# Patient Record
Sex: MALE | Race: WHITE | Employment: OTHER | URBAN - METROPOLITAN AREA
[De-identification: names, ages, dates, MRNs, and addresses within clinical notes are randomized per-mention and may not be internally consistent; named-entity substitution may affect disease eponyms.]

---

## 2017-01-13 ENCOUNTER — TELEPHONE (OUTPATIENT)
Dept: FAMILY MEDICINE CLINIC | Age: 82
End: 2017-01-13

## 2017-01-16 ENCOUNTER — TELEPHONE (OUTPATIENT)
Dept: FAMILY MEDICINE CLINIC | Age: 82
End: 2017-01-16

## 2017-01-16 DIAGNOSIS — R79.89 LOW TESTOSTERONE: Primary | ICD-10-CM

## 2017-01-17 ENCOUNTER — NURSE ONLY (OUTPATIENT)
Dept: FAMILY MEDICINE CLINIC | Age: 82
End: 2017-01-17

## 2017-01-17 DIAGNOSIS — R79.89 LOW TESTOSTERONE: Primary | ICD-10-CM

## 2017-01-17 DIAGNOSIS — E78.00 PURE HYPERCHOLESTEROLEMIA: ICD-10-CM

## 2017-01-17 PROCEDURE — 96372 THER/PROPH/DIAG INJ SC/IM: CPT | Performed by: FAMILY MEDICINE

## 2017-01-17 RX ORDER — TESTOSTERONE CYPIONATE 200 MG/ML
200 INJECTION INTRAMUSCULAR ONCE
Status: COMPLETED | OUTPATIENT
Start: 2017-01-17 | End: 2017-01-17

## 2017-01-17 RX ADMIN — TESTOSTERONE CYPIONATE 200 MG: 200 INJECTION INTRAMUSCULAR at 10:17

## 2017-01-18 RX ORDER — SIMVASTATIN 10 MG
10 TABLET ORAL NIGHTLY
Qty: 90 TABLET | Refills: 2 | Status: SHIPPED | OUTPATIENT
Start: 2017-01-18 | End: 2018-01-09 | Stop reason: SDUPTHER

## 2017-01-31 ENCOUNTER — NURSE ONLY (OUTPATIENT)
Dept: FAMILY MEDICINE CLINIC | Age: 82
End: 2017-01-31

## 2017-01-31 DIAGNOSIS — R79.89 LOW TESTOSTERONE: Primary | ICD-10-CM

## 2017-01-31 PROCEDURE — 96372 THER/PROPH/DIAG INJ SC/IM: CPT | Performed by: FAMILY MEDICINE

## 2017-01-31 RX ORDER — TESTOSTERONE CYPIONATE 200 MG/ML
200 INJECTION INTRAMUSCULAR ONCE
Status: COMPLETED | OUTPATIENT
Start: 2017-01-31 | End: 2017-01-31

## 2017-01-31 RX ADMIN — TESTOSTERONE CYPIONATE 200 MG: 200 INJECTION INTRAMUSCULAR at 13:26

## 2017-02-15 ENCOUNTER — NURSE ONLY (OUTPATIENT)
Dept: FAMILY MEDICINE CLINIC | Age: 82
End: 2017-02-15

## 2017-02-15 DIAGNOSIS — R79.89 LOW TESTOSTERONE: Primary | ICD-10-CM

## 2017-02-15 PROCEDURE — 96372 THER/PROPH/DIAG INJ SC/IM: CPT | Performed by: FAMILY MEDICINE

## 2017-02-15 RX ORDER — TESTOSTERONE CYPIONATE 200 MG/ML
200 INJECTION INTRAMUSCULAR ONCE
Status: COMPLETED | OUTPATIENT
Start: 2017-02-15 | End: 2017-02-15

## 2017-02-15 RX ADMIN — TESTOSTERONE CYPIONATE 200 MG: 200 INJECTION INTRAMUSCULAR at 10:17

## 2017-03-01 ENCOUNTER — NURSE ONLY (OUTPATIENT)
Dept: FAMILY MEDICINE CLINIC | Age: 82
End: 2017-03-01

## 2017-03-01 DIAGNOSIS — R79.89 LOW TESTOSTERONE: Primary | ICD-10-CM

## 2017-03-01 PROCEDURE — 96372 THER/PROPH/DIAG INJ SC/IM: CPT | Performed by: FAMILY MEDICINE

## 2017-03-01 RX ORDER — TESTOSTERONE CYPIONATE 200 MG/ML
200 INJECTION INTRAMUSCULAR ONCE
Status: COMPLETED | OUTPATIENT
Start: 2017-03-01 | End: 2017-03-01

## 2017-03-01 RX ADMIN — TESTOSTERONE CYPIONATE 200 MG: 200 INJECTION INTRAMUSCULAR at 11:29

## 2017-03-13 ENCOUNTER — TELEPHONE (OUTPATIENT)
Dept: FAMILY MEDICINE CLINIC | Age: 82
End: 2017-03-13

## 2017-03-13 RX ORDER — CEPHALEXIN 500 MG/1
500 CAPSULE ORAL 3 TIMES DAILY
Qty: 30 CAPSULE | Refills: 0 | Status: SHIPPED | OUTPATIENT
Start: 2017-03-13 | End: 2017-03-23

## 2017-03-27 ENCOUNTER — OFFICE VISIT (OUTPATIENT)
Dept: FAMILY MEDICINE CLINIC | Age: 82
End: 2017-03-27

## 2017-03-27 VITALS
HEART RATE: 76 BPM | BODY MASS INDEX: 25.9 KG/M2 | HEIGHT: 71 IN | SYSTOLIC BLOOD PRESSURE: 124 MMHG | RESPIRATION RATE: 14 BRPM | DIASTOLIC BLOOD PRESSURE: 68 MMHG | WEIGHT: 185 LBS

## 2017-03-27 DIAGNOSIS — I25.810 CORONARY ARTERY DISEASE INVOLVING AUTOLOGOUS VEIN CORONARY BYPASS GRAFT WITHOUT ANGINA PECTORIS: ICD-10-CM

## 2017-03-27 DIAGNOSIS — E78.00 PURE HYPERCHOLESTEROLEMIA: ICD-10-CM

## 2017-03-27 DIAGNOSIS — I10 ESSENTIAL HYPERTENSION: ICD-10-CM

## 2017-03-27 DIAGNOSIS — R79.89 LOW TESTOSTERONE: ICD-10-CM

## 2017-03-27 DIAGNOSIS — J01.20 ACUTE NON-RECURRENT ETHMOIDAL SINUSITIS: ICD-10-CM

## 2017-03-27 DIAGNOSIS — M15.9 PRIMARY OSTEOARTHRITIS INVOLVING MULTIPLE JOINTS: ICD-10-CM

## 2017-03-27 DIAGNOSIS — E11.9 TYPE 2 DIABETES MELLITUS WITHOUT COMPLICATION, WITHOUT LONG-TERM CURRENT USE OF INSULIN (HCC): Primary | ICD-10-CM

## 2017-03-27 DIAGNOSIS — E11.8 TYPE 2 DIABETES MELLITUS WITH COMPLICATION, WITHOUT LONG-TERM CURRENT USE OF INSULIN (HCC): ICD-10-CM

## 2017-03-27 LAB
GLUCOSE BLD-MCNC: 141 MG/DL
HBA1C MFR BLD: 6.7 %

## 2017-03-27 PROCEDURE — 99213 OFFICE O/P EST LOW 20 MIN: CPT | Performed by: FAMILY MEDICINE

## 2017-03-27 PROCEDURE — 82962 GLUCOSE BLOOD TEST: CPT | Performed by: FAMILY MEDICINE

## 2017-03-27 PROCEDURE — 83036 HEMOGLOBIN GLYCOSYLATED A1C: CPT | Performed by: FAMILY MEDICINE

## 2017-03-27 RX ORDER — ENALAPRIL MALEATE 2.5 MG/1
TABLET ORAL
Qty: 90 TABLET | Refills: 1 | Status: SHIPPED | OUTPATIENT
Start: 2017-03-27 | End: 2017-07-11 | Stop reason: SDUPTHER

## 2017-03-27 RX ORDER — CEPHALEXIN 500 MG/1
500 CAPSULE ORAL 3 TIMES DAILY
Qty: 30 CAPSULE | Refills: 0 | Status: SHIPPED | OUTPATIENT
Start: 2017-03-27 | End: 2017-04-25 | Stop reason: ALTCHOICE

## 2017-03-27 RX ORDER — MELOXICAM 7.5 MG/1
TABLET ORAL
Qty: 90 TABLET | Refills: 1 | Status: SHIPPED | OUTPATIENT
Start: 2017-03-27 | End: 2017-10-17 | Stop reason: SDUPTHER

## 2017-03-27 RX ORDER — GLIMEPIRIDE 4 MG/1
TABLET ORAL
Qty: 90 TABLET | Refills: 1 | Status: SHIPPED | OUTPATIENT
Start: 2017-03-27 | End: 2017-07-11 | Stop reason: SDUPTHER

## 2017-03-27 ASSESSMENT — ENCOUNTER SYMPTOMS
ABDOMINAL PAIN: 0
SORE THROAT: 0
BACK PAIN: 0
SHORTNESS OF BREATH: 0
CHEST TIGHTNESS: 0
NAUSEA: 0
COUGH: 1
CONSTIPATION: 0
ORTHOPNEA: 0
TROUBLE SWALLOWING: 0
BLOOD IN STOOL: 0
EYE PAIN: 0

## 2017-03-27 ASSESSMENT — PATIENT HEALTH QUESTIONNAIRE - PHQ9
1. LITTLE INTEREST OR PLEASURE IN DOING THINGS: 0
SUM OF ALL RESPONSES TO PHQ QUESTIONS 1-9: 0
SUM OF ALL RESPONSES TO PHQ9 QUESTIONS 1 & 2: 0
2. FEELING DOWN, DEPRESSED OR HOPELESS: 0

## 2017-04-07 ENCOUNTER — NURSE ONLY (OUTPATIENT)
Dept: FAMILY MEDICINE CLINIC | Age: 82
End: 2017-04-07

## 2017-04-07 DIAGNOSIS — R79.89 LOW TESTOSTERONE: Primary | ICD-10-CM

## 2017-04-07 PROCEDURE — 96372 THER/PROPH/DIAG INJ SC/IM: CPT | Performed by: FAMILY MEDICINE

## 2017-04-07 RX ORDER — TESTOSTERONE CYPIONATE 200 MG/ML
200 INJECTION INTRAMUSCULAR ONCE
Status: COMPLETED | OUTPATIENT
Start: 2017-04-07 | End: 2017-04-07

## 2017-04-07 RX ADMIN — TESTOSTERONE CYPIONATE 200 MG: 200 INJECTION INTRAMUSCULAR at 09:51

## 2017-04-25 ENCOUNTER — OFFICE VISIT (OUTPATIENT)
Dept: INTERNAL MEDICINE | Age: 82
End: 2017-04-25

## 2017-04-25 VITALS
HEIGHT: 70 IN | HEART RATE: 80 BPM | SYSTOLIC BLOOD PRESSURE: 138 MMHG | DIASTOLIC BLOOD PRESSURE: 76 MMHG | WEIGHT: 189.2 LBS | BODY MASS INDEX: 27.09 KG/M2

## 2017-04-25 DIAGNOSIS — E78.00 PURE HYPERCHOLESTEROLEMIA: ICD-10-CM

## 2017-04-25 DIAGNOSIS — I10 ESSENTIAL HYPERTENSION: ICD-10-CM

## 2017-04-25 DIAGNOSIS — I25.810 CORONARY ARTERY DISEASE INVOLVING AUTOLOGOUS VEIN CORONARY BYPASS GRAFT WITHOUT ANGINA PECTORIS: Primary | ICD-10-CM

## 2017-04-25 PROCEDURE — 4040F PNEUMOC VAC/ADMIN/RCVD: CPT | Performed by: INTERNAL MEDICINE

## 2017-04-25 PROCEDURE — G8427 DOCREV CUR MEDS BY ELIG CLIN: HCPCS | Performed by: INTERNAL MEDICINE

## 2017-04-25 PROCEDURE — 99214 OFFICE O/P EST MOD 30 MIN: CPT | Performed by: INTERNAL MEDICINE

## 2017-04-25 PROCEDURE — G8420 CALC BMI NORM PARAMETERS: HCPCS | Performed by: INTERNAL MEDICINE

## 2017-04-25 PROCEDURE — 1123F ACP DISCUSS/DSCN MKR DOCD: CPT | Performed by: INTERNAL MEDICINE

## 2017-04-25 PROCEDURE — 93000 ELECTROCARDIOGRAM COMPLETE: CPT | Performed by: INTERNAL MEDICINE

## 2017-04-25 PROCEDURE — 1036F TOBACCO NON-USER: CPT | Performed by: INTERNAL MEDICINE

## 2017-04-25 PROCEDURE — G8598 ASA/ANTIPLAT THER USED: HCPCS | Performed by: INTERNAL MEDICINE

## 2017-05-03 ENCOUNTER — NURSE ONLY (OUTPATIENT)
Dept: FAMILY MEDICINE CLINIC | Age: 82
End: 2017-05-03

## 2017-05-03 DIAGNOSIS — R79.89 LOW TESTOSTERONE: Primary | ICD-10-CM

## 2017-05-03 PROCEDURE — 96372 THER/PROPH/DIAG INJ SC/IM: CPT | Performed by: FAMILY MEDICINE

## 2017-05-03 RX ORDER — TESTOSTERONE CYPIONATE 200 MG/ML
200 INJECTION INTRAMUSCULAR ONCE
Status: COMPLETED | OUTPATIENT
Start: 2017-05-03 | End: 2017-05-03

## 2017-05-03 RX ADMIN — TESTOSTERONE CYPIONATE 200 MG: 200 INJECTION INTRAMUSCULAR at 10:37

## 2017-05-08 ENCOUNTER — TELEPHONE (OUTPATIENT)
Dept: FAMILY MEDICINE CLINIC | Age: 82
End: 2017-05-08

## 2017-05-08 DIAGNOSIS — R79.89 LOW TESTOSTERONE: Primary | ICD-10-CM

## 2017-05-31 ENCOUNTER — NURSE ONLY (OUTPATIENT)
Dept: FAMILY MEDICINE CLINIC | Age: 82
End: 2017-05-31

## 2017-05-31 DIAGNOSIS — R79.89 LOW TESTOSTERONE: Primary | ICD-10-CM

## 2017-05-31 PROCEDURE — 96372 THER/PROPH/DIAG INJ SC/IM: CPT | Performed by: FAMILY MEDICINE

## 2017-05-31 RX ORDER — TESTOSTERONE CYPIONATE 200 MG/ML
300 INJECTION INTRAMUSCULAR ONCE
Status: COMPLETED | OUTPATIENT
Start: 2017-05-31 | End: 2017-05-31

## 2017-05-31 RX ADMIN — TESTOSTERONE CYPIONATE 300 MG: 200 INJECTION INTRAMUSCULAR at 10:59

## 2017-06-26 DIAGNOSIS — I10 ESSENTIAL HYPERTENSION: ICD-10-CM

## 2017-06-27 RX ORDER — MELOXICAM 7.5 MG/1
TABLET ORAL
Qty: 90 TABLET | Refills: 1 | Status: SHIPPED | OUTPATIENT
Start: 2017-06-27 | End: 2018-01-09 | Stop reason: SDUPTHER

## 2017-06-30 ENCOUNTER — NURSE ONLY (OUTPATIENT)
Dept: FAMILY MEDICINE CLINIC | Age: 82
End: 2017-06-30

## 2017-06-30 DIAGNOSIS — R79.89 LOW TESTOSTERONE: Primary | ICD-10-CM

## 2017-06-30 PROCEDURE — 96372 THER/PROPH/DIAG INJ SC/IM: CPT | Performed by: FAMILY MEDICINE

## 2017-06-30 RX ORDER — TESTOSTERONE CYPIONATE 200 MG/ML
200 INJECTION INTRAMUSCULAR ONCE
Status: COMPLETED | OUTPATIENT
Start: 2017-06-30 | End: 2017-06-30

## 2017-06-30 RX ADMIN — TESTOSTERONE CYPIONATE 200 MG: 200 INJECTION INTRAMUSCULAR at 12:53

## 2017-07-11 ENCOUNTER — OFFICE VISIT (OUTPATIENT)
Dept: FAMILY MEDICINE CLINIC | Age: 82
End: 2017-07-11

## 2017-07-11 VITALS
DIASTOLIC BLOOD PRESSURE: 56 MMHG | HEIGHT: 70 IN | RESPIRATION RATE: 14 BRPM | SYSTOLIC BLOOD PRESSURE: 102 MMHG | HEART RATE: 68 BPM | BODY MASS INDEX: 26.25 KG/M2 | WEIGHT: 183.38 LBS

## 2017-07-11 DIAGNOSIS — I10 ESSENTIAL HYPERTENSION: ICD-10-CM

## 2017-07-11 DIAGNOSIS — E11.9 TYPE 2 DIABETES MELLITUS WITHOUT COMPLICATION, WITHOUT LONG-TERM CURRENT USE OF INSULIN (HCC): Primary | ICD-10-CM

## 2017-07-11 DIAGNOSIS — E78.00 PURE HYPERCHOLESTEROLEMIA: ICD-10-CM

## 2017-07-11 DIAGNOSIS — E11.8 TYPE 2 DIABETES MELLITUS WITH COMPLICATION, WITHOUT LONG-TERM CURRENT USE OF INSULIN (HCC): ICD-10-CM

## 2017-07-11 DIAGNOSIS — M17.0 PRIMARY OSTEOARTHRITIS OF BOTH KNEES: ICD-10-CM

## 2017-07-11 LAB
GLUCOSE BLD-MCNC: 137 MG/DL
HBA1C MFR BLD: 6.8 %

## 2017-07-11 PROCEDURE — 99213 OFFICE O/P EST LOW 20 MIN: CPT | Performed by: FAMILY MEDICINE

## 2017-07-11 PROCEDURE — 82962 GLUCOSE BLOOD TEST: CPT | Performed by: FAMILY MEDICINE

## 2017-07-11 PROCEDURE — 83036 HEMOGLOBIN GLYCOSYLATED A1C: CPT | Performed by: FAMILY MEDICINE

## 2017-07-11 RX ORDER — GLIMEPIRIDE 4 MG/1
TABLET ORAL
Qty: 90 TABLET | Refills: 1 | Status: SHIPPED | OUTPATIENT
Start: 2017-07-11 | End: 2017-09-22 | Stop reason: SDUPTHER

## 2017-07-11 RX ORDER — ENALAPRIL MALEATE 2.5 MG/1
TABLET ORAL
Qty: 90 TABLET | Refills: 1 | Status: SHIPPED | OUTPATIENT
Start: 2017-07-11 | End: 2018-01-09 | Stop reason: SDUPTHER

## 2017-07-11 ASSESSMENT — ENCOUNTER SYMPTOMS
CHEST TIGHTNESS: 0
CONSTIPATION: 0
BACK PAIN: 0
BLOOD IN STOOL: 0
TROUBLE SWALLOWING: 0
SORE THROAT: 0
EYE PAIN: 0
SHORTNESS OF BREATH: 0
COUGH: 0
NAUSEA: 0
ABDOMINAL PAIN: 0
ORTHOPNEA: 0

## 2017-07-11 ASSESSMENT — PATIENT HEALTH QUESTIONNAIRE - PHQ9
SUM OF ALL RESPONSES TO PHQ QUESTIONS 1-9: 0
SUM OF ALL RESPONSES TO PHQ9 QUESTIONS 1 & 2: 0
1. LITTLE INTEREST OR PLEASURE IN DOING THINGS: 0
2. FEELING DOWN, DEPRESSED OR HOPELESS: 0

## 2017-07-25 ENCOUNTER — NURSE ONLY (OUTPATIENT)
Dept: FAMILY MEDICINE CLINIC | Age: 82
End: 2017-07-25

## 2017-07-25 DIAGNOSIS — R79.89 LOW TESTOSTERONE: Primary | ICD-10-CM

## 2017-07-25 PROCEDURE — 96372 THER/PROPH/DIAG INJ SC/IM: CPT | Performed by: FAMILY MEDICINE

## 2017-07-25 RX ORDER — TESTOSTERONE CYPIONATE 200 MG/ML
300 INJECTION INTRAMUSCULAR ONCE
Status: COMPLETED | OUTPATIENT
Start: 2017-07-25 | End: 2017-07-25

## 2017-07-25 RX ADMIN — TESTOSTERONE CYPIONATE 300 MG: 200 INJECTION INTRAMUSCULAR at 11:41

## 2017-08-22 ENCOUNTER — NURSE ONLY (OUTPATIENT)
Dept: FAMILY MEDICINE CLINIC | Age: 82
End: 2017-08-22

## 2017-08-22 DIAGNOSIS — R79.89 LOW TESTOSTERONE: Primary | ICD-10-CM

## 2017-08-22 PROCEDURE — 96372 THER/PROPH/DIAG INJ SC/IM: CPT | Performed by: FAMILY MEDICINE

## 2017-08-22 RX ORDER — TESTOSTERONE CYPIONATE 200 MG/ML
200 INJECTION INTRAMUSCULAR ONCE
Status: COMPLETED | OUTPATIENT
Start: 2017-08-22 | End: 2017-08-22

## 2017-08-22 RX ADMIN — TESTOSTERONE CYPIONATE 200 MG: 200 INJECTION INTRAMUSCULAR at 10:56

## 2017-09-20 ENCOUNTER — NURSE ONLY (OUTPATIENT)
Dept: FAMILY MEDICINE CLINIC | Age: 82
End: 2017-09-20

## 2017-09-20 DIAGNOSIS — R79.89 LOW TESTOSTERONE: Primary | ICD-10-CM

## 2017-09-20 PROCEDURE — 96372 THER/PROPH/DIAG INJ SC/IM: CPT | Performed by: FAMILY MEDICINE

## 2017-09-20 RX ORDER — TESTOSTERONE CYPIONATE 200 MG/ML
200 INJECTION INTRAMUSCULAR ONCE
Status: COMPLETED | OUTPATIENT
Start: 2017-09-20 | End: 2017-09-20

## 2017-09-20 RX ADMIN — TESTOSTERONE CYPIONATE 200 MG: 200 INJECTION INTRAMUSCULAR at 10:26

## 2017-09-22 DIAGNOSIS — E11.8 TYPE 2 DIABETES MELLITUS WITH COMPLICATION, WITHOUT LONG-TERM CURRENT USE OF INSULIN (HCC): ICD-10-CM

## 2017-09-24 RX ORDER — GLIMEPIRIDE 4 MG/1
TABLET ORAL
Qty: 90 TABLET | Refills: 1 | Status: SHIPPED | OUTPATIENT
Start: 2017-09-24 | End: 2018-01-09 | Stop reason: SDUPTHER

## 2017-10-11 ENCOUNTER — HOSPITAL ENCOUNTER (OUTPATIENT)
Age: 82
Discharge: HOME | End: 2017-10-11
Payer: MEDICARE

## 2017-10-11 DIAGNOSIS — E78.00 PURE HYPERCHOLESTEROLEMIA: ICD-10-CM

## 2017-10-11 DIAGNOSIS — I25.810 CORONARY ARTERY DISEASE INVOLVING AUTOLOGOUS VEIN CORONARY BYPASS GRAFT WITHOUT ANGINA PECTORIS: ICD-10-CM

## 2017-10-11 DIAGNOSIS — I10 ESSENTIAL HYPERTENSION: ICD-10-CM

## 2017-10-11 LAB
ALT SERPL-CCNC: 15 U/L (ref 11–66)
ANION GAP SERPL CALCULATED.3IONS-SCNC: 10 MEQ/L (ref 8–16)
AST SERPL-CCNC: 14 U/L (ref 5–40)
BUN BLDV-MCNC: 33 MG/DL (ref 7–22)
CALCIUM SERPL-MCNC: 9 MG/DL (ref 8.5–10.5)
CHLORIDE BLD-SCNC: 104 MEQ/L (ref 98–111)
CHOLESTEROL, TOTAL: 124 MG/DL (ref 100–199)
CO2: 27 MEQ/L (ref 23–33)
CREAT SERPL-MCNC: 1.1 MG/DL (ref 0.4–1.2)
GFR SERPL CREATININE-BSD FRML MDRD: 64 ML/MIN/1.73M2
GLUCOSE BLD-MCNC: 150 MG/DL (ref 70–108)
HCT VFR BLD CALC: 48.2 % (ref 42–52)
HDLC SERPL-MCNC: 42 MG/DL
HEMOGLOBIN: 16.4 GM/DL (ref 14–18)
LDL CHOLESTEROL CALCULATED: 63 MG/DL
MCH RBC QN AUTO: 31.9 PG (ref 27–31)
MCHC RBC AUTO-ENTMCNC: 34 GM/DL (ref 33–37)
MCV RBC AUTO: 93.7 FL (ref 80–94)
PDW BLD-RTO: 13.8 % (ref 11.5–14.5)
PLATELET # BLD: 162 THOU/MM3 (ref 130–400)
PMV BLD AUTO: 7.1 MCM (ref 7.4–10.4)
POTASSIUM SERPL-SCNC: 5.3 MEQ/L (ref 3.5–5.2)
RBC # BLD: 5.14 MILL/MM3 (ref 4.7–6.1)
SODIUM BLD-SCNC: 141 MEQ/L (ref 135–145)
TRIGL SERPL-MCNC: 95 MG/DL (ref 0–199)
WBC # BLD: 6.1 THOU/MM3 (ref 4.8–10.8)

## 2017-10-11 PROCEDURE — 36415 COLL VENOUS BLD VENIPUNCTURE: CPT

## 2017-10-11 PROCEDURE — 84460 ALANINE AMINO (ALT) (SGPT): CPT

## 2017-10-11 PROCEDURE — 85027 COMPLETE CBC AUTOMATED: CPT

## 2017-10-11 PROCEDURE — 84450 TRANSFERASE (AST) (SGOT): CPT

## 2017-10-11 PROCEDURE — 80061 LIPID PANEL: CPT

## 2017-10-11 PROCEDURE — 80048 BASIC METABOLIC PNL TOTAL CA: CPT

## 2017-10-17 ENCOUNTER — OFFICE VISIT (OUTPATIENT)
Dept: INTERNAL MEDICINE CLINIC | Age: 82
End: 2017-10-17
Payer: MEDICARE

## 2017-10-17 ENCOUNTER — OFFICE VISIT (OUTPATIENT)
Dept: FAMILY MEDICINE CLINIC | Age: 82
End: 2017-10-17

## 2017-10-17 VITALS
SYSTOLIC BLOOD PRESSURE: 108 MMHG | HEIGHT: 70 IN | BODY MASS INDEX: 25.62 KG/M2 | DIASTOLIC BLOOD PRESSURE: 54 MMHG | HEART RATE: 82 BPM | WEIGHT: 179 LBS

## 2017-10-17 VITALS
SYSTOLIC BLOOD PRESSURE: 102 MMHG | HEIGHT: 70 IN | HEART RATE: 72 BPM | WEIGHT: 180.5 LBS | RESPIRATION RATE: 14 BRPM | DIASTOLIC BLOOD PRESSURE: 54 MMHG | BODY MASS INDEX: 25.84 KG/M2

## 2017-10-17 DIAGNOSIS — L57.0 ACTINIC KERATOSIS: ICD-10-CM

## 2017-10-17 DIAGNOSIS — I10 ESSENTIAL HYPERTENSION: ICD-10-CM

## 2017-10-17 DIAGNOSIS — E78.00 PURE HYPERCHOLESTEROLEMIA: ICD-10-CM

## 2017-10-17 DIAGNOSIS — E11.9 TYPE 2 DIABETES MELLITUS WITHOUT COMPLICATION, WITHOUT LONG-TERM CURRENT USE OF INSULIN (HCC): Primary | ICD-10-CM

## 2017-10-17 DIAGNOSIS — R79.89 LOW TESTOSTERONE: ICD-10-CM

## 2017-10-17 DIAGNOSIS — E11.9 TYPE 2 DIABETES MELLITUS WITHOUT COMPLICATION, WITHOUT LONG-TERM CURRENT USE OF INSULIN (HCC): ICD-10-CM

## 2017-10-17 DIAGNOSIS — I25.10 CORONARY ARTERY DISEASE INVOLVING NATIVE CORONARY ARTERY OF NATIVE HEART WITHOUT ANGINA PECTORIS: Primary | ICD-10-CM

## 2017-10-17 DIAGNOSIS — Z23 NEED FOR IMMUNIZATION AGAINST INFLUENZA: ICD-10-CM

## 2017-10-17 DIAGNOSIS — I25.810 CORONARY ARTERY DISEASE INVOLVING AUTOLOGOUS VEIN CORONARY BYPASS GRAFT WITHOUT ANGINA PECTORIS: ICD-10-CM

## 2017-10-17 LAB
CREATININE URINE POCT: NORMAL
GLUCOSE BLD-MCNC: 163 MG/DL
HBA1C MFR BLD: 7 %
MICROALBUMIN/CREAT 24H UR: 20 MG/G{CREAT}
MICROALBUMIN/CREAT UR-RTO: NORMAL

## 2017-10-17 PROCEDURE — 1123F ACP DISCUSS/DSCN MKR DOCD: CPT | Performed by: INTERNAL MEDICINE

## 2017-10-17 PROCEDURE — 1036F TOBACCO NON-USER: CPT | Performed by: INTERNAL MEDICINE

## 2017-10-17 PROCEDURE — 99213 OFFICE O/P EST LOW 20 MIN: CPT | Performed by: FAMILY MEDICINE

## 2017-10-17 PROCEDURE — G8484 FLU IMMUNIZE NO ADMIN: HCPCS | Performed by: INTERNAL MEDICINE

## 2017-10-17 PROCEDURE — G8598 ASA/ANTIPLAT THER USED: HCPCS | Performed by: INTERNAL MEDICINE

## 2017-10-17 PROCEDURE — 17003 DESTRUCT PREMALG LES 2-14: CPT | Performed by: FAMILY MEDICINE

## 2017-10-17 PROCEDURE — G0008 ADMIN INFLUENZA VIRUS VAC: HCPCS | Performed by: FAMILY MEDICINE

## 2017-10-17 PROCEDURE — G8427 DOCREV CUR MEDS BY ELIG CLIN: HCPCS | Performed by: INTERNAL MEDICINE

## 2017-10-17 PROCEDURE — G8417 CALC BMI ABV UP PARAM F/U: HCPCS | Performed by: INTERNAL MEDICINE

## 2017-10-17 PROCEDURE — 17000 DESTRUCT PREMALG LESION: CPT | Performed by: FAMILY MEDICINE

## 2017-10-17 PROCEDURE — 96372 THER/PROPH/DIAG INJ SC/IM: CPT | Performed by: FAMILY MEDICINE

## 2017-10-17 PROCEDURE — 83036 HEMOGLOBIN GLYCOSYLATED A1C: CPT | Performed by: FAMILY MEDICINE

## 2017-10-17 PROCEDURE — 99213 OFFICE O/P EST LOW 20 MIN: CPT | Performed by: INTERNAL MEDICINE

## 2017-10-17 PROCEDURE — 82962 GLUCOSE BLOOD TEST: CPT | Performed by: FAMILY MEDICINE

## 2017-10-17 PROCEDURE — 4040F PNEUMOC VAC/ADMIN/RCVD: CPT | Performed by: INTERNAL MEDICINE

## 2017-10-17 PROCEDURE — 82044 UR ALBUMIN SEMIQUANTITATIVE: CPT | Performed by: FAMILY MEDICINE

## 2017-10-17 RX ORDER — TESTOSTERONE CYPIONATE 200 MG/ML
200 INJECTION INTRAMUSCULAR ONCE
Status: COMPLETED | OUTPATIENT
Start: 2017-10-17 | End: 2017-10-17

## 2017-10-17 RX ORDER — RANITIDINE 150 MG/1
150 TABLET ORAL DAILY PRN
COMMUNITY
End: 2020-07-14 | Stop reason: ALTCHOICE

## 2017-10-17 RX ADMIN — TESTOSTERONE CYPIONATE 200 MG: 200 INJECTION INTRAMUSCULAR at 09:57

## 2017-10-17 ASSESSMENT — ENCOUNTER SYMPTOMS
NAUSEA: 0
BACK PAIN: 0
CHEST TIGHTNESS: 0
ABDOMINAL PAIN: 0
COUGH: 0
ORTHOPNEA: 0
SHORTNESS OF BREATH: 0
CONSTIPATION: 0
BLOOD IN STOOL: 0
TROUBLE SWALLOWING: 0
SORE THROAT: 0
EYE PAIN: 0

## 2017-10-17 NOTE — PROGRESS NOTES
YOB: 1935    Chief Complaint   Patient presents with    Coronary Artery Disease     6 month follow up    Hypertension    Diabetes    Shortness of Breath     gets a little SOB if goes up and down 1-2 flights of steps occasionally     NEW SXS: none; had cabg 2005, cath 2009. No chest pain. No signif sob. Blood sugars good    \"full of arthritis\"; takes Mobic    This patient presents for medical evaluation. This patient is followed regularly by Dr. Maynor Hay. Patient Active Problem List   Diagnosis    Hypertension    Hyperlipidemia    CAD (coronary artery disease)    Type 2 diabetes mellitus without complication (HCC)    Colon polyp    Low testosterone       Current Outpatient Prescriptions   Medication Sig Dispense Refill    ranitidine (ZANTAC) 150 MG tablet Take 150 mg by mouth daily as needed for Heartburn      metFORMIN (GLUCOPHAGE) 500 MG tablet TAKE 1 TABLET EVERY MORNINGAND TAKE 2 TABLETS EVERY   EVENING 270 tablet 1    glimepiride (AMARYL) 4 MG tablet TAKE 1/2 TABLET TWICE A DAY 90 tablet 1    enalapril (VASOTEC) 2.5 MG tablet New lower dose one daily (Patient taking differently: Take 2.5 mg by mouth every other day New lower dose one daily) 90 tablet 1    metoprolol tartrate (LOPRESSOR) 25 MG tablet TAKE 1/2 TABLET TWICE A DAY 90 tablet 1    meloxicam (MOBIC) 7.5 MG tablet TAKE 1 TABLET DAILY 90 tablet 1    glucose blood VI test strips (ACCU-CHEK RUPA) strip Use to check blood sugar daily, Dx: E11.9 300 each 3    simvastatin (ZOCOR) 10 MG tablet Take 1 tablet by mouth nightly New lower dose 90 tablet 2    tamsulosin (FLOMAX) 0.4 MG capsule Take 1 capsule by mouth daily 90 capsule 3    nitroGLYCERIN (NITROSTAT) 0.4 MG SL tablet Place 1 tablet under the tongue every 5 minutes as needed 25 tablet 5    Ascorbic Acid (VITAMIN C) 1000 MG tablet Take 1,000 mg by mouth daily.  aspirin EC 81 MG EC tablet Take 81 mg by mouth daily.          No current facility-administered medications for this visit. No Known Allergies    I have reviewed the patient's medications, as well as, their past medical, social, and family history as appropriate. Review of Systems - General ROS: negative  Psychological ROS: negative  Hematological and Lymphatic ROS: No history of blood clots or bleeding disorder. Respiratory ROS: no cough, shortness of breath, or wheezing  Cardiovascular ROS: no chest pain or dyspnea on exertion  Gastrointestinal ROS: no abdominal pain, change in bowel habits, or black or bloody stools  Genito-Urinary ROS: no dysuria, trouble voiding, or hematuria  Musculoskeletal ROS: see above  Neurological ROS: notes numbness, tingling feet  Dermatological ROS: negative  Endo- low testosterone    Blood pressure (!) 108/54, pulse 82, height 5' 10\" (1.778 m), weight 179 lb (81.2 kg). Physical Examination: General appearance - alert, well appearing, and in no distress  Mental status - alert, oriented to person, place, and time  Neck - supple, no significant adenopathy, no JVD, or carotid bruits  Chest - clear to auscultation, no wheezes, rales or rhonchi, symmetric air entry  Heart - normal rate, regular rhythm, normal S1, S2, no murmurs, rubs, clicks or gallops  Abdomen - soft, nontender, nondistended, no masses or organomegaly  Neurological - alert, oriented, normal speech, no focal findings or movement disorder noted  Musculoskeletal -  Multiple nodules  Extremities - peripheral pulses normal, no pedal edema, no clubbing or cyanosis  Skin - normal coloration and turgor, no rashes, no suspicious skin lesions noted         1. Coronary artery disease involving native coronary artery of native heart without angina pectoris     2. Type 2 diabetes mellitus without complication, without long-term current use of insulin ([facility] [address]     3. Essential hypertension         No orders of the defined types were placed in this encounter.        IMP: 1. Cad, s/p cabg, stable  2. Dm controlled     PLAN: discussed cv risks of mobic and nsaids    Will schedule follow up appointment in 6m. Continue medications at current doses. Signed By:  Mitzy Parker M.D.

## 2017-10-17 NOTE — PROGRESS NOTES
Subjective:      Patient ID: Jia Webber is a [de-identified] y.o. male. Knee  Pain  Better  After  Injections       ashd  Stable      Actinic  Keratosis  Of  The  Forehead and   Ear               Diabetes   He presents for his follow-up diabetic visit. He has type 2 diabetes mellitus. His disease course has been stable. There are no hypoglycemic associated symptoms. Pertinent negatives for hypoglycemia include no dizziness or headaches. There are no diabetic associated symptoms. Pertinent negatives for diabetes include no chest pain, no fatigue and no weakness. There are no hypoglycemic complications. Symptoms are stable. There are no diabetic complications. Current diabetic treatment includes oral agent (dual therapy). He is compliant with treatment all of the time. His weight is stable. Meal planning includes avoidance of concentrated sweets. There is no change in his home blood glucose trend. His breakfast blood glucose range is generally  mg/dl. An ACE inhibitor/angiotensin II receptor blocker is being taken. Hypertension   This is a chronic problem. The current episode started more than 1 year ago. Pertinent negatives include no chest pain, headaches, orthopnea, palpitations, peripheral edema or shortness of breath. The current treatment provides no improvement. There are no compliance problems. Hyperlipidemia   This is a chronic problem. The current episode started more than 1 year ago. The problem is controlled. Pertinent negatives include no chest pain or shortness of breath. Current antihyperlipidemic treatment includes statins. The current treatment provides significant improvement of lipids. There are no compliance problems.       Past Medical History:   Diagnosis Date    CAD (coronary artery disease)     Colon polyp     Hyperlipidemia     Hypertension     S/P CABG (coronary artery bypass graft)     Type II or unspecified type diabetes mellitus without mention of complication, not stated as He has no cervical adenopathy. Neurological: He is alert and oriented to person, place, and time. He has normal reflexes. No cranial nerve deficit. Skin: Skin is warm and dry. No rash noted. 3  actinic  Keratosis  Of    Forehead and  One  On  right  Ear    Psychiatric: He has a normal mood and affect. Nursing note and vitals reviewed. Cryo  To  All  4  lesions    Assessment:      1. Type 2 diabetes mellitus without complication, without long-term current use of insulin (HCC)  POCT glycosylated hemoglobin (Hb A1C)    POCT Glucose   2. Need for immunization against influenza  INFLUENZA, MDCK QUADV, 4 YRS AND OLDER, IM, PF, PREFILL SYR OR SDV, 0.5ML (FLUCELVAX QUADV, PF)   3. Low testosterone  testosterone cypionate (DEPOTESTOTERONE CYPIONATE) injection 200 mg   4. Essential hypertension     5. Pure hypercholesterolemia     6. Coronary artery disease involving autologous vein coronary bypass graft without angina pectoris     7.  Actinic keratosis  [place] DESTRUC PREMALIG[place], FIRST LESION    [place] DESTRUC PREMALIG[place],2-14 LESIONS         Plan:      Current Outpatient Prescriptions   Medication Sig Dispense Refill    metFORMIN (GLUCOPHAGE) 500 MG tablet TAKE 1 TABLET EVERY MORNINGAND TAKE 2 TABLETS EVERY   EVENING 270 tablet 1    glimepiride (AMARYL) 4 MG tablet TAKE 1/2 TABLET TWICE A DAY 90 tablet 1    enalapril (VASOTEC) 2.5 MG tablet New lower dose one daily (Patient taking differently: Take 2.5 mg by mouth every other day New lower dose one daily) 90 tablet 1    metoprolol tartrate (LOPRESSOR) 25 MG tablet TAKE 1/2 TABLET TWICE A DAY 90 tablet 1    meloxicam (MOBIC) 7.5 MG tablet TAKE 1 TABLET DAILY 90 tablet 1    meloxicam (MOBIC) 7.5 MG tablet TAKE 1 TABLET DAILY 90 tablet 1    glucose blood VI test strips (ACCU-CHEK RUPA) strip Use to check blood sugar daily, Dx: E11.9 300 each 3    simvastatin (ZOCOR) 10 MG tablet Take 1 tablet by mouth nightly New lower dose 90 tablet 2    tamsulosin (FLOMAX)

## 2017-10-17 NOTE — PROGRESS NOTES
Administered Depo-Testosterone, 200mg IM to left gluteal. Patient informed and educated on medication. Tolerated well.

## 2017-10-17 NOTE — PROGRESS NOTES
Administered Flucelvax Vaccine, IM to left deltoid. Patient informed and educated on medication. Tolerated well.

## 2017-10-19 ENCOUNTER — TELEPHONE (OUTPATIENT)
Dept: INTERNAL MEDICINE CLINIC | Age: 82
End: 2017-10-19

## 2017-10-19 NOTE — TELEPHONE ENCOUNTER
----- Message from Satnam Estrada MD sent at 10/19/2017 11:18 AM EDT -----  Tell pt labs ok   May give values

## 2017-10-19 NOTE — TELEPHONE ENCOUNTER
Notified pt and his wife as they were both on the phone, labs are all OK. They verbalizes understanding.

## 2017-11-21 ENCOUNTER — NURSE ONLY (OUTPATIENT)
Dept: FAMILY MEDICINE CLINIC | Age: 82
End: 2017-11-21

## 2017-11-21 DIAGNOSIS — R79.89 LOW TESTOSTERONE: Primary | ICD-10-CM

## 2017-11-21 PROCEDURE — 96372 THER/PROPH/DIAG INJ SC/IM: CPT | Performed by: FAMILY MEDICINE

## 2017-11-21 RX ORDER — TESTOSTERONE CYPIONATE 200 MG/ML
200 INJECTION INTRAMUSCULAR ONCE
Status: COMPLETED | OUTPATIENT
Start: 2017-11-21 | End: 2017-11-21

## 2017-11-21 RX ADMIN — TESTOSTERONE CYPIONATE 200 MG: 200 INJECTION INTRAMUSCULAR at 09:14

## 2017-11-21 NOTE — PROGRESS NOTES
Administered Depo-Testosterone, 200mg IM to right gluteal. Patient informed and educated on medication. Tolerated well.

## 2017-12-20 ENCOUNTER — NURSE ONLY (OUTPATIENT)
Dept: FAMILY MEDICINE CLINIC | Age: 82
End: 2017-12-20

## 2017-12-20 DIAGNOSIS — R79.89 LOW TESTOSTERONE: Primary | ICD-10-CM

## 2017-12-20 PROCEDURE — 96372 THER/PROPH/DIAG INJ SC/IM: CPT | Performed by: FAMILY MEDICINE

## 2017-12-20 RX ORDER — TESTOSTERONE CYPIONATE 200 MG/ML
200 INJECTION INTRAMUSCULAR ONCE
Status: COMPLETED | OUTPATIENT
Start: 2017-12-20 | End: 2017-12-20

## 2017-12-20 RX ADMIN — TESTOSTERONE CYPIONATE 200 MG: 200 INJECTION INTRAMUSCULAR at 13:39

## 2018-01-09 ENCOUNTER — OFFICE VISIT (OUTPATIENT)
Dept: FAMILY MEDICINE CLINIC | Age: 83
End: 2018-01-09

## 2018-01-09 VITALS
DIASTOLIC BLOOD PRESSURE: 88 MMHG | BODY MASS INDEX: 26.54 KG/M2 | SYSTOLIC BLOOD PRESSURE: 138 MMHG | WEIGHT: 185.38 LBS | HEIGHT: 70 IN | HEART RATE: 72 BPM | RESPIRATION RATE: 16 BRPM

## 2018-01-09 DIAGNOSIS — N40.0 BENIGN PROSTATIC HYPERPLASIA WITHOUT LOWER URINARY TRACT SYMPTOMS: ICD-10-CM

## 2018-01-09 DIAGNOSIS — E78.00 PURE HYPERCHOLESTEROLEMIA: ICD-10-CM

## 2018-01-09 DIAGNOSIS — I10 ESSENTIAL HYPERTENSION: ICD-10-CM

## 2018-01-09 DIAGNOSIS — M17.0 PRIMARY OSTEOARTHRITIS OF BOTH KNEES: ICD-10-CM

## 2018-01-09 DIAGNOSIS — E11.8 TYPE 2 DIABETES MELLITUS WITH COMPLICATION, WITHOUT LONG-TERM CURRENT USE OF INSULIN (HCC): ICD-10-CM

## 2018-01-09 DIAGNOSIS — K59.00 CONSTIPATION, UNSPECIFIED CONSTIPATION TYPE: Primary | ICD-10-CM

## 2018-01-09 PROCEDURE — 99213 OFFICE O/P EST LOW 20 MIN: CPT | Performed by: FAMILY MEDICINE

## 2018-01-09 RX ORDER — ENALAPRIL MALEATE 2.5 MG/1
2.5 TABLET ORAL EVERY OTHER DAY
Qty: 90 TABLET | Refills: 1 | Status: SHIPPED | OUTPATIENT
Start: 2018-01-09 | End: 2018-08-16 | Stop reason: SDUPTHER

## 2018-01-09 RX ORDER — SIMVASTATIN 10 MG
10 TABLET ORAL NIGHTLY
Qty: 90 TABLET | Refills: 2 | Status: SHIPPED | OUTPATIENT
Start: 2018-01-09 | End: 2018-11-19 | Stop reason: SDUPTHER

## 2018-01-09 RX ORDER — POLYETHYLENE GLYCOL 3350 17 G/17G
17 POWDER, FOR SOLUTION ORAL DAILY
Qty: 510 G | Refills: 2 | Status: SHIPPED | OUTPATIENT
Start: 2018-01-09 | End: 2019-05-28 | Stop reason: SDUPTHER

## 2018-01-09 RX ORDER — GLIMEPIRIDE 4 MG/1
TABLET ORAL
Qty: 90 TABLET | Refills: 1 | Status: SHIPPED | OUTPATIENT
Start: 2018-01-09 | End: 2018-08-16 | Stop reason: SDUPTHER

## 2018-01-09 RX ORDER — MELOXICAM 7.5 MG/1
TABLET ORAL
Qty: 90 TABLET | Refills: 1 | Status: SHIPPED | OUTPATIENT
Start: 2018-01-09 | End: 2018-08-16 | Stop reason: SDUPTHER

## 2018-01-09 ASSESSMENT — ENCOUNTER SYMPTOMS
TROUBLE SWALLOWING: 0
CONSTIPATION: 1
COUGH: 0
NAUSEA: 0
VOMITING: 0
ABDOMINAL PAIN: 0
SHORTNESS OF BREATH: 0
BLOOD IN STOOL: 0
CHEST TIGHTNESS: 0
EYE PAIN: 0
BACK PAIN: 0
SORE THROAT: 0

## 2018-01-09 NOTE — PROGRESS NOTES
well-developed and well-nourished. HENT:   Head: Normocephalic and atraumatic. Right Ear: External ear normal.   Left Ear: External ear normal.   Nose: Nose normal.   Mouth/Throat: Oropharynx is clear and moist.   Eyes: Conjunctivae and EOM are normal. Pupils are equal, round, and reactive to light. Fundi nl   Neck: Normal range of motion. Neck supple. No thyromegaly present. Cardiovascular: Normal rate, regular rhythm, normal heart sounds and intact distal pulses. Pulmonary/Chest: Effort normal and breath sounds normal. He has no wheezes. He has no rales. Abdominal: Soft. Bowel sounds are normal. He exhibits no mass. There is no tenderness. Musculoskeletal: Normal range of motion. Lymphadenopathy:     He has no cervical adenopathy. Neurological: He is alert and oriented to person, place, and time. He has normal reflexes. No cranial nerve deficit. Skin: Skin is warm and dry. No rash noted. Psychiatric: He has a normal mood and affect. Nursing note and vitals reviewed. Assessment:      1. Constipation, unspecified constipation type  polyethylene glycol (MIRALAX) powder   2. Pure hypercholesterolemia  simvastatin (ZOCOR) 10 MG tablet    Lipid Panel   3. Type 2 diabetes mellitus with complication, without long-term current use of insulin (HCC)  glimepiride (AMARYL) 4 MG tablet    metFORMIN (GLUCOPHAGE) 500 MG tablet   4. Essential hypertension  enalapril (VASOTEC) 2.5 MG tablet    metoprolol tartrate (LOPRESSOR) 25 MG tablet    CBC with Differential    Comprehensive Metabolic Panel    TSH with Reflex   5. Primary osteoarthritis of both knees  meloxicam (MOBIC) 7.5 MG tablet   6.  Benign prostatic hyperplasia without lower urinary tract symptoms  PSA, Diagnostic         Plan:      Current Outpatient Prescriptions   Medication Sig Dispense Refill    meloxicam (MOBIC) 7.5 MG tablet TAKE 1 TABLET DAILY 90 tablet 1    simvastatin (ZOCOR) 10 MG tablet Take 1 tablet by mouth nightly New lower dose 90 tablet 2    glimepiride (AMARYL) 4 MG tablet TAKE 1/2 TABLET TWICE A DAY 90 tablet 1    enalapril (VASOTEC) 2.5 MG tablet Take 1 tablet by mouth every other day New lower dose one daily 90 tablet 1    metoprolol tartrate (LOPRESSOR) 25 MG tablet TAKE 1/2 TABLET TWICE A DAY 90 tablet 1    metFORMIN (GLUCOPHAGE) 500 MG tablet TAKE 1 TABLET EVERY MORNINGAND TAKE 2 TABLETS EVERY   EVENING 270 tablet 1    polyethylene glycol (MIRALAX) powder Take 17 g by mouth daily 510 g 2    ranitidine (ZANTAC) 150 MG tablet Take 150 mg by mouth daily as needed for Heartburn      glucose blood VI test strips (ACCU-CHEK RUPA) strip Use to check blood sugar daily, Dx: E11.9 300 each 3    tamsulosin (FLOMAX) 0.4 MG capsule Take 1 capsule by mouth daily 90 capsule 3    nitroGLYCERIN (NITROSTAT) 0.4 MG SL tablet Place 1 tablet under the tongue every 5 minutes as needed 25 tablet 5    Ascorbic Acid (VITAMIN C) 1000 MG tablet Take 1,000 mg by mouth daily.  aspirin EC 81 MG EC tablet Take 81 mg by mouth daily. No current facility-administered medications for this visit. Orders Placed This Encounter   Procedures    CBC with Differential     Standing Status:   Future     Standing Expiration Date:   1/9/2019    Comprehensive Metabolic Panel     Standing Status:   Future     Standing Expiration Date:   1/9/2019    Lipid Panel     Standing Status:   Future     Standing Expiration Date:   1/9/2019     Order Specific Question:   Is Patient Fasting?/# of Hours     Answer:   YES 12 HOURS    TSH with Reflex     Standing Status:   Future     Standing Expiration Date:   1/9/2019    PSA, Diagnostic     Standing Status:   Future     Standing Expiration Date:   1/9/2019       No results found for this visit on 01/09/18.     See in  One  Month    miralax and  senkot   See in one  monht

## 2018-01-22 ENCOUNTER — HOSPITAL ENCOUNTER (OUTPATIENT)
Age: 83
Discharge: HOME | End: 2018-01-22
Payer: MEDICARE

## 2018-01-22 DIAGNOSIS — I10 ESSENTIAL HYPERTENSION: ICD-10-CM

## 2018-01-22 DIAGNOSIS — N40.0 BENIGN PROSTATIC HYPERPLASIA WITHOUT LOWER URINARY TRACT SYMPTOMS: ICD-10-CM

## 2018-01-22 DIAGNOSIS — E78.00 PURE HYPERCHOLESTEROLEMIA: ICD-10-CM

## 2018-01-22 LAB
ALBUMIN SERPL-MCNC: 4.4 G/DL (ref 3.5–5.1)
ALP BLD-CCNC: 74 U/L (ref 38–126)
ALT SERPL-CCNC: 12 U/L (ref 11–66)
ANION GAP SERPL CALCULATED.3IONS-SCNC: 12 MEQ/L (ref 8–16)
AST SERPL-CCNC: 12 U/L (ref 5–40)
BASOPHILS # BLD: 1 %
BASOPHILS ABSOLUTE: 0.1 THOU/MM3 (ref 0–0.1)
BILIRUB SERPL-MCNC: 1.3 MG/DL (ref 0.3–1.2)
BUN BLDV-MCNC: 35 MG/DL (ref 7–22)
CALCIUM SERPL-MCNC: 9.4 MG/DL (ref 8.5–10.5)
CHLORIDE BLD-SCNC: 104 MEQ/L (ref 98–111)
CHOLESTEROL, TOTAL: 121 MG/DL (ref 100–199)
CO2: 26 MEQ/L (ref 23–33)
CREAT SERPL-MCNC: 1.1 MG/DL (ref 0.4–1.2)
EOSINOPHIL # BLD: 5.2 %
EOSINOPHILS ABSOLUTE: 0.3 THOU/MM3 (ref 0–0.4)
GFR SERPL CREATININE-BSD FRML MDRD: 64 ML/MIN/1.73M2
GLUCOSE BLD-MCNC: 157 MG/DL (ref 70–108)
HCT VFR BLD CALC: 46.9 % (ref 42–52)
HDLC SERPL-MCNC: 47 MG/DL
HEMOGLOBIN: 15.8 GM/DL (ref 14–18)
LDL CHOLESTEROL CALCULATED: 57 MG/DL
LYMPHOCYTES # BLD: 18.4 %
LYMPHOCYTES ABSOLUTE: 1.1 THOU/MM3 (ref 1–4.8)
MCH RBC QN AUTO: 31.6 PG (ref 27–31)
MCHC RBC AUTO-ENTMCNC: 33.6 GM/DL (ref 33–37)
MCV RBC AUTO: 94.1 FL (ref 80–94)
MONOCYTES # BLD: 9.9 %
MONOCYTES ABSOLUTE: 0.6 THOU/MM3 (ref 0.4–1.3)
NUCLEATED RED BLOOD CELLS: 0 /100 WBC
PDW BLD-RTO: 12.6 % (ref 11.5–14.5)
PLATELET # BLD: 157 THOU/MM3 (ref 130–400)
PMV BLD AUTO: 7.3 MCM (ref 7.4–10.4)
POTASSIUM SERPL-SCNC: 5 MEQ/L (ref 3.5–5.2)
PROSTATE SPECIFIC ANTIGEN: 0.42 NG/ML (ref 0–1)
RBC # BLD: 4.99 MILL/MM3 (ref 4.7–6.1)
SEG NEUTROPHILS: 65.5 %
SEGMENTED NEUTROPHILS ABSOLUTE COUNT: 3.8 THOU/MM3 (ref 1.8–7.7)
SODIUM BLD-SCNC: 142 MEQ/L (ref 135–145)
T4 FREE: 0.87 NG/DL (ref 0.93–1.76)
TOTAL PROTEIN: 6.7 G/DL (ref 6.1–8)
TRIGL SERPL-MCNC: 84 MG/DL (ref 0–199)
TSH SERPL DL<=0.05 MIU/L-ACNC: 4.74 UIU/ML (ref 0.4–4.2)
WBC # BLD: 5.8 THOU/MM3 (ref 4.8–10.8)

## 2018-01-22 PROCEDURE — 84443 ASSAY THYROID STIM HORMONE: CPT

## 2018-01-22 PROCEDURE — 80053 COMPREHEN METABOLIC PANEL: CPT

## 2018-01-22 PROCEDURE — 80061 LIPID PANEL: CPT

## 2018-01-22 PROCEDURE — 84439 ASSAY OF FREE THYROXINE: CPT

## 2018-01-22 PROCEDURE — 36415 COLL VENOUS BLD VENIPUNCTURE: CPT

## 2018-01-22 PROCEDURE — 84153 ASSAY OF PSA TOTAL: CPT

## 2018-01-22 PROCEDURE — 85025 COMPLETE CBC W/AUTO DIFF WBC: CPT

## 2018-01-24 ENCOUNTER — TELEPHONE (OUTPATIENT)
Dept: FAMILY MEDICINE CLINIC | Age: 83
End: 2018-01-24

## 2018-01-24 ENCOUNTER — NURSE ONLY (OUTPATIENT)
Dept: FAMILY MEDICINE CLINIC | Age: 83
End: 2018-01-24

## 2018-01-24 DIAGNOSIS — R79.89 LOW TESTOSTERONE: Primary | ICD-10-CM

## 2018-01-24 DIAGNOSIS — E03.4 HYPOTHYROIDISM DUE TO ACQUIRED ATROPHY OF THYROID: ICD-10-CM

## 2018-01-24 DIAGNOSIS — N40.1 BENIGN NON-NODULAR PROSTATIC HYPERPLASIA WITH LOWER URINARY TRACT SYMPTOMS: ICD-10-CM

## 2018-01-24 PROCEDURE — 96372 THER/PROPH/DIAG INJ SC/IM: CPT | Performed by: FAMILY MEDICINE

## 2018-01-24 RX ORDER — LEVOTHYROXINE SODIUM 0.03 MG/1
25 TABLET ORAL DAILY
Qty: 90 TABLET | Refills: 1 | Status: SHIPPED | OUTPATIENT
Start: 2018-01-24 | End: 2018-07-30 | Stop reason: SDUPTHER

## 2018-01-24 RX ORDER — TESTOSTERONE CYPIONATE 200 MG/ML
300 INJECTION INTRAMUSCULAR ONCE
Status: COMPLETED | OUTPATIENT
Start: 2018-01-24 | End: 2018-01-24

## 2018-01-24 RX ADMIN — TESTOSTERONE CYPIONATE 300 MG: 200 INJECTION INTRAMUSCULAR at 10:40

## 2018-01-25 RX ORDER — TAMSULOSIN HYDROCHLORIDE 0.4 MG/1
0.4 CAPSULE ORAL DAILY
Qty: 90 CAPSULE | Refills: 3 | Status: SHIPPED | OUTPATIENT
Start: 2018-01-25 | End: 2018-05-09 | Stop reason: SDUPTHER

## 2018-02-09 ENCOUNTER — OFFICE VISIT (OUTPATIENT)
Dept: FAMILY MEDICINE CLINIC | Age: 83
End: 2018-02-09

## 2018-02-09 VITALS
HEART RATE: 84 BPM | BODY MASS INDEX: 26.81 KG/M2 | SYSTOLIC BLOOD PRESSURE: 132 MMHG | RESPIRATION RATE: 10 BRPM | DIASTOLIC BLOOD PRESSURE: 60 MMHG | WEIGHT: 187.25 LBS | HEIGHT: 70 IN

## 2018-02-09 DIAGNOSIS — I10 ESSENTIAL HYPERTENSION: ICD-10-CM

## 2018-02-09 DIAGNOSIS — E11.9 TYPE 2 DIABETES MELLITUS WITHOUT COMPLICATION, WITHOUT LONG-TERM CURRENT USE OF INSULIN (HCC): Primary | ICD-10-CM

## 2018-02-09 DIAGNOSIS — E78.00 PURE HYPERCHOLESTEROLEMIA: ICD-10-CM

## 2018-02-09 DIAGNOSIS — I25.10 CORONARY ARTERY DISEASE INVOLVING NATIVE CORONARY ARTERY OF NATIVE HEART WITHOUT ANGINA PECTORIS: ICD-10-CM

## 2018-02-09 DIAGNOSIS — E03.4 HYPOTHYROIDISM DUE TO ACQUIRED ATROPHY OF THYROID: ICD-10-CM

## 2018-02-09 LAB
GLUCOSE BLD-MCNC: 191 MG/DL
HBA1C MFR BLD: 6.9 %

## 2018-02-09 PROCEDURE — 83036 HEMOGLOBIN GLYCOSYLATED A1C: CPT | Performed by: FAMILY MEDICINE

## 2018-02-09 PROCEDURE — 82962 GLUCOSE BLOOD TEST: CPT | Performed by: FAMILY MEDICINE

## 2018-02-09 PROCEDURE — 99213 OFFICE O/P EST LOW 20 MIN: CPT | Performed by: FAMILY MEDICINE

## 2018-02-09 ASSESSMENT — ENCOUNTER SYMPTOMS
EYE PAIN: 0
BACK PAIN: 0
SORE THROAT: 0
CONSTIPATION: 0
NAUSEA: 0
ABDOMINAL PAIN: 0
ORTHOPNEA: 0
SHORTNESS OF BREATH: 0
TROUBLE SWALLOWING: 0
CHEST TIGHTNESS: 0
COUGH: 0
BLOOD IN STOOL: 0

## 2018-02-22 ENCOUNTER — NURSE ONLY (OUTPATIENT)
Dept: FAMILY MEDICINE CLINIC | Age: 83
End: 2018-02-22

## 2018-02-22 DIAGNOSIS — R79.89 LOW TESTOSTERONE: Primary | ICD-10-CM

## 2018-02-22 PROCEDURE — 96372 THER/PROPH/DIAG INJ SC/IM: CPT | Performed by: FAMILY MEDICINE

## 2018-02-22 RX ORDER — TESTOSTERONE CYPIONATE 200 MG/ML
200 INJECTION INTRAMUSCULAR ONCE
Status: COMPLETED | OUTPATIENT
Start: 2018-02-22 | End: 2018-02-22

## 2018-02-22 RX ADMIN — TESTOSTERONE CYPIONATE 200 MG: 200 INJECTION INTRAMUSCULAR at 14:51

## 2018-03-23 ENCOUNTER — NURSE ONLY (OUTPATIENT)
Dept: FAMILY MEDICINE CLINIC | Age: 83
End: 2018-03-23

## 2018-03-23 DIAGNOSIS — R79.89 LOW TESTOSTERONE: Primary | ICD-10-CM

## 2018-03-23 PROCEDURE — 96372 THER/PROPH/DIAG INJ SC/IM: CPT | Performed by: FAMILY MEDICINE

## 2018-03-23 RX ORDER — TESTOSTERONE CYPIONATE 200 MG/ML
200 INJECTION INTRAMUSCULAR ONCE
Status: COMPLETED | OUTPATIENT
Start: 2018-03-23 | End: 2018-03-23

## 2018-03-23 RX ADMIN — TESTOSTERONE CYPIONATE 200 MG: 200 INJECTION INTRAMUSCULAR at 10:36

## 2018-04-20 ENCOUNTER — NURSE ONLY (OUTPATIENT)
Dept: FAMILY MEDICINE CLINIC | Age: 83
End: 2018-04-20

## 2018-04-20 DIAGNOSIS — R79.89 LOW TESTOSTERONE: Primary | ICD-10-CM

## 2018-04-20 PROCEDURE — 96372 THER/PROPH/DIAG INJ SC/IM: CPT | Performed by: FAMILY MEDICINE

## 2018-04-20 RX ORDER — TESTOSTERONE CYPIONATE 200 MG/ML
200 INJECTION INTRAMUSCULAR ONCE
Status: COMPLETED | OUTPATIENT
Start: 2018-04-20 | End: 2018-04-20

## 2018-04-20 RX ADMIN — TESTOSTERONE CYPIONATE 200 MG: 200 INJECTION INTRAMUSCULAR at 10:47

## 2018-04-24 ENCOUNTER — OFFICE VISIT (OUTPATIENT)
Dept: INTERNAL MEDICINE CLINIC | Age: 83
End: 2018-04-24
Payer: MEDICARE

## 2018-04-24 VITALS
BODY MASS INDEX: 26.05 KG/M2 | DIASTOLIC BLOOD PRESSURE: 66 MMHG | SYSTOLIC BLOOD PRESSURE: 110 MMHG | WEIGHT: 182 LBS | HEIGHT: 70 IN

## 2018-04-24 DIAGNOSIS — E78.00 PURE HYPERCHOLESTEROLEMIA: ICD-10-CM

## 2018-04-24 DIAGNOSIS — I10 ESSENTIAL HYPERTENSION: ICD-10-CM

## 2018-04-24 DIAGNOSIS — I25.10 CORONARY ARTERY DISEASE INVOLVING NATIVE CORONARY ARTERY OF NATIVE HEART WITHOUT ANGINA PECTORIS: Primary | ICD-10-CM

## 2018-04-24 PROCEDURE — 1036F TOBACCO NON-USER: CPT | Performed by: INTERNAL MEDICINE

## 2018-04-24 PROCEDURE — 1123F ACP DISCUSS/DSCN MKR DOCD: CPT | Performed by: INTERNAL MEDICINE

## 2018-04-24 PROCEDURE — 4040F PNEUMOC VAC/ADMIN/RCVD: CPT | Performed by: INTERNAL MEDICINE

## 2018-04-24 PROCEDURE — 93000 ELECTROCARDIOGRAM COMPLETE: CPT | Performed by: INTERNAL MEDICINE

## 2018-04-24 PROCEDURE — G8417 CALC BMI ABV UP PARAM F/U: HCPCS | Performed by: INTERNAL MEDICINE

## 2018-04-24 PROCEDURE — 99213 OFFICE O/P EST LOW 20 MIN: CPT | Performed by: INTERNAL MEDICINE

## 2018-04-24 PROCEDURE — G8427 DOCREV CUR MEDS BY ELIG CLIN: HCPCS | Performed by: INTERNAL MEDICINE

## 2018-04-24 PROCEDURE — G8598 ASA/ANTIPLAT THER USED: HCPCS | Performed by: INTERNAL MEDICINE

## 2018-05-07 DIAGNOSIS — N40.1 BENIGN NON-NODULAR PROSTATIC HYPERPLASIA WITH LOWER URINARY TRACT SYMPTOMS: ICD-10-CM

## 2018-05-09 ENCOUNTER — OFFICE VISIT (OUTPATIENT)
Dept: FAMILY MEDICINE CLINIC | Age: 83
End: 2018-05-09

## 2018-05-09 VITALS
BODY MASS INDEX: 26.11 KG/M2 | RESPIRATION RATE: 12 BRPM | WEIGHT: 182.38 LBS | HEART RATE: 60 BPM | HEIGHT: 70 IN | SYSTOLIC BLOOD PRESSURE: 116 MMHG | DIASTOLIC BLOOD PRESSURE: 66 MMHG

## 2018-05-09 DIAGNOSIS — N40.1 BENIGN NON-NODULAR PROSTATIC HYPERPLASIA WITH LOWER URINARY TRACT SYMPTOMS: ICD-10-CM

## 2018-05-09 DIAGNOSIS — E11.9 TYPE 2 DIABETES MELLITUS WITHOUT COMPLICATION, WITHOUT LONG-TERM CURRENT USE OF INSULIN (HCC): ICD-10-CM

## 2018-05-09 DIAGNOSIS — I25.10 CORONARY ARTERY DISEASE INVOLVING NATIVE CORONARY ARTERY OF NATIVE HEART WITHOUT ANGINA PECTORIS: ICD-10-CM

## 2018-05-09 DIAGNOSIS — E03.4 HYPOTHYROIDISM DUE TO ACQUIRED ATROPHY OF THYROID: ICD-10-CM

## 2018-05-09 DIAGNOSIS — E78.00 PURE HYPERCHOLESTEROLEMIA: ICD-10-CM

## 2018-05-09 DIAGNOSIS — I10 ESSENTIAL HYPERTENSION: Primary | ICD-10-CM

## 2018-05-09 PROCEDURE — 99213 OFFICE O/P EST LOW 20 MIN: CPT | Performed by: FAMILY MEDICINE

## 2018-05-09 RX ORDER — TAMSULOSIN HYDROCHLORIDE 0.4 MG/1
CAPSULE ORAL
Qty: 30 CAPSULE | Refills: 3 | Status: SHIPPED | OUTPATIENT
Start: 2018-05-09 | End: 2018-05-09 | Stop reason: SDUPTHER

## 2018-05-09 RX ORDER — TAMSULOSIN HYDROCHLORIDE 0.4 MG/1
0.4 CAPSULE ORAL DAILY
Qty: 90 CAPSULE | Refills: 3 | Status: SHIPPED | OUTPATIENT
Start: 2018-05-09 | End: 2019-09-16 | Stop reason: SDUPTHER

## 2018-05-09 ASSESSMENT — ENCOUNTER SYMPTOMS
BACK PAIN: 0
ORTHOPNEA: 0
EYE PAIN: 0
ABDOMINAL PAIN: 0
NAUSEA: 0
SHORTNESS OF BREATH: 0
CHEST TIGHTNESS: 0
BLOOD IN STOOL: 0
CONSTIPATION: 0
TROUBLE SWALLOWING: 0
COUGH: 0
SORE THROAT: 0

## 2018-05-14 ENCOUNTER — NURSE ONLY (OUTPATIENT)
Dept: FAMILY MEDICINE CLINIC | Age: 83
End: 2018-05-14

## 2018-05-14 DIAGNOSIS — E11.9 TYPE 2 DIABETES MELLITUS WITHOUT COMPLICATION, WITHOUT LONG-TERM CURRENT USE OF INSULIN (HCC): Primary | ICD-10-CM

## 2018-05-14 LAB
GLUCOSE BLD-MCNC: 125 MG/DL
HBA1C MFR BLD: 6.8 %

## 2018-05-14 PROCEDURE — 82962 GLUCOSE BLOOD TEST: CPT | Performed by: FAMILY MEDICINE

## 2018-05-14 PROCEDURE — 83036 HEMOGLOBIN GLYCOSYLATED A1C: CPT | Performed by: FAMILY MEDICINE

## 2018-05-24 ENCOUNTER — NURSE ONLY (OUTPATIENT)
Dept: FAMILY MEDICINE CLINIC | Age: 83
End: 2018-05-24

## 2018-05-24 DIAGNOSIS — R79.89 LOW TESTOSTERONE: Primary | ICD-10-CM

## 2018-05-24 PROCEDURE — 96372 THER/PROPH/DIAG INJ SC/IM: CPT | Performed by: FAMILY MEDICINE

## 2018-05-24 RX ORDER — TESTOSTERONE CYPIONATE 200 MG/ML
200 INJECTION INTRAMUSCULAR ONCE
Status: COMPLETED | OUTPATIENT
Start: 2018-05-24 | End: 2018-05-24

## 2018-05-24 RX ADMIN — TESTOSTERONE CYPIONATE 200 MG: 200 INJECTION INTRAMUSCULAR at 11:28

## 2018-06-26 ENCOUNTER — NURSE ONLY (OUTPATIENT)
Dept: FAMILY MEDICINE CLINIC | Age: 83
End: 2018-06-26

## 2018-06-26 DIAGNOSIS — R79.89 LOW TESTOSTERONE: Primary | ICD-10-CM

## 2018-06-26 PROCEDURE — 96372 THER/PROPH/DIAG INJ SC/IM: CPT | Performed by: FAMILY MEDICINE

## 2018-06-26 RX ORDER — TESTOSTERONE CYPIONATE 200 MG/ML
200 INJECTION INTRAMUSCULAR ONCE
Status: COMPLETED | OUTPATIENT
Start: 2018-06-26 | End: 2018-06-26

## 2018-06-26 RX ADMIN — TESTOSTERONE CYPIONATE 200 MG: 200 INJECTION INTRAMUSCULAR at 08:53

## 2018-07-24 ENCOUNTER — NURSE ONLY (OUTPATIENT)
Dept: FAMILY MEDICINE CLINIC | Age: 83
End: 2018-07-24

## 2018-07-24 DIAGNOSIS — R79.89 LOW TESTOSTERONE: Primary | ICD-10-CM

## 2018-07-24 PROCEDURE — 96372 THER/PROPH/DIAG INJ SC/IM: CPT | Performed by: FAMILY MEDICINE

## 2018-07-24 RX ORDER — TESTOSTERONE CYPIONATE 200 MG/ML
200 INJECTION INTRAMUSCULAR ONCE
Status: COMPLETED | OUTPATIENT
Start: 2018-07-24 | End: 2018-07-24

## 2018-07-24 RX ADMIN — TESTOSTERONE CYPIONATE 200 MG: 200 INJECTION INTRAMUSCULAR at 12:36

## 2018-07-30 DIAGNOSIS — E03.4 HYPOTHYROIDISM DUE TO ACQUIRED ATROPHY OF THYROID: ICD-10-CM

## 2018-07-30 RX ORDER — LEVOTHYROXINE SODIUM 0.03 MG/1
25 TABLET ORAL DAILY
Qty: 30 TABLET | Refills: 0 | Status: SHIPPED | OUTPATIENT
Start: 2018-07-30 | End: 2018-08-30 | Stop reason: SDUPTHER

## 2018-07-30 NOTE — TELEPHONE ENCOUNTER
Pt's wife called requesting Rx for levothyroxine 25 mcg take one tablet daily    [facility])

## 2018-08-16 ENCOUNTER — OFFICE VISIT (OUTPATIENT)
Dept: FAMILY MEDICINE CLINIC | Age: 83
End: 2018-08-16

## 2018-08-16 VITALS
HEIGHT: 70 IN | BODY MASS INDEX: 25.21 KG/M2 | SYSTOLIC BLOOD PRESSURE: 138 MMHG | WEIGHT: 176.13 LBS | HEART RATE: 74 BPM | DIASTOLIC BLOOD PRESSURE: 76 MMHG | RESPIRATION RATE: 13 BRPM

## 2018-08-16 DIAGNOSIS — E11.8 TYPE 2 DIABETES MELLITUS WITH COMPLICATION, WITHOUT LONG-TERM CURRENT USE OF INSULIN (HCC): ICD-10-CM

## 2018-08-16 DIAGNOSIS — H61.23 BILATERAL HEARING LOSS DUE TO CERUMEN IMPACTION: ICD-10-CM

## 2018-08-16 DIAGNOSIS — E11.9 TYPE 2 DIABETES MELLITUS WITHOUT COMPLICATION, WITHOUT LONG-TERM CURRENT USE OF INSULIN (HCC): Primary | ICD-10-CM

## 2018-08-16 DIAGNOSIS — I10 ESSENTIAL HYPERTENSION: ICD-10-CM

## 2018-08-16 DIAGNOSIS — M17.0 PRIMARY OSTEOARTHRITIS OF BOTH KNEES: ICD-10-CM

## 2018-08-16 LAB
GLUCOSE BLD-MCNC: 149 MG/DL
HBA1C MFR BLD: 6.6 %

## 2018-08-16 PROCEDURE — 83036 HEMOGLOBIN GLYCOSYLATED A1C: CPT | Performed by: FAMILY MEDICINE

## 2018-08-16 PROCEDURE — 82962 GLUCOSE BLOOD TEST: CPT | Performed by: FAMILY MEDICINE

## 2018-08-16 PROCEDURE — 99213 OFFICE O/P EST LOW 20 MIN: CPT | Performed by: FAMILY MEDICINE

## 2018-08-16 PROCEDURE — 69210 REMOVE IMPACTED EAR WAX UNI: CPT | Performed by: FAMILY MEDICINE

## 2018-08-16 PROCEDURE — 1101F PT FALLS ASSESS-DOCD LE1/YR: CPT | Performed by: FAMILY MEDICINE

## 2018-08-16 RX ORDER — ENALAPRIL MALEATE 2.5 MG/1
2.5 TABLET ORAL EVERY OTHER DAY
Qty: 90 TABLET | Refills: 1 | Status: SHIPPED | OUTPATIENT
Start: 2018-08-16 | End: 2020-01-07 | Stop reason: SDUPTHER

## 2018-08-16 RX ORDER — MELOXICAM 7.5 MG/1
TABLET ORAL
Qty: 90 TABLET | Refills: 1 | Status: SHIPPED | OUTPATIENT
Start: 2018-08-16 | End: 2019-01-28 | Stop reason: SDUPTHER

## 2018-08-16 RX ORDER — GLIMEPIRIDE 4 MG/1
TABLET ORAL
Qty: 90 TABLET | Refills: 1 | Status: SHIPPED | OUTPATIENT
Start: 2018-08-16 | End: 2019-01-28 | Stop reason: SDUPTHER

## 2018-08-16 ASSESSMENT — ENCOUNTER SYMPTOMS
ABDOMINAL PAIN: 0
BACK PAIN: 0
SHORTNESS OF BREATH: 0
CHEST TIGHTNESS: 0
EYE PAIN: 0
BLOOD IN STOOL: 0
NAUSEA: 0
CONSTIPATION: 0
TROUBLE SWALLOWING: 0
SORE THROAT: 0
COUGH: 0

## 2018-08-16 ASSESSMENT — PATIENT HEALTH QUESTIONNAIRE - PHQ9
2. FEELING DOWN, DEPRESSED OR HOPELESS: 0
SUM OF ALL RESPONSES TO PHQ QUESTIONS 1-9: 0
SUM OF ALL RESPONSES TO PHQ9 QUESTIONS 1 & 2: 0
1. LITTLE INTEREST OR PLEASURE IN DOING THINGS: 0
SUM OF ALL RESPONSES TO PHQ QUESTIONS 1-9: 0

## 2018-08-16 NOTE — PROGRESS NOTES
ear irrigation and  Cerumen  impaction   Assessment:       Diagnosis Orders   1. Type 2 diabetes mellitus without complication, without long-term current use of insulin ([facility])  POCT Glucose    POCT glycosylated hemoglobin (Hb A1C)   2. Primary osteoarthritis of both knees  meloxicam (MOBIC) 7.5 MG tablet   3. Essential hypertension  enalapril (VASOTEC) 2.5 MG tablet    metoprolol tartrate (LOPRESSOR) 25 MG tablet   4. Type 2 diabetes mellitus with complication, without long-term current use of insulin ([facility])  glimepiride (AMARYL) 4 MG tablet    metFORMIN (GLUCOPHAGE) 500 MG tablet   5.  Bilateral hearing loss due to cerumen impaction  NC REMOVAL IMPACTED CERUMEN INSTRUMENTATION UNILAT         Plan:      Current Outpatient Prescriptions   Medication Sig Dispense Refill    meloxicam (MOBIC) 7.5 MG tablet TAKE 1 TABLET DAILY 90 tablet 1    enalapril (VASOTEC) 2.5 MG tablet Take 1 tablet by mouth every other day New lower dose one daily 90 tablet 1    glimepiride (AMARYL) 4 MG tablet TAKE 1/2 TABLET TWICE A DAY 90 tablet 1    metoprolol tartrate (LOPRESSOR) 25 MG tablet TAKE 1/2 TABLET TWICE A DAY 90 tablet 1    metFORMIN (GLUCOPHAGE) 500 MG tablet TAKE 1 TABLET EVERY MORNINGAND TAKE 2 TABLETS EVERY   EVENING 270 tablet 1    levothyroxine (SYNTHROID) 25 MCG tablet Take 1 tablet by mouth Daily 30 tablet 0    tamsulosin (FLOMAX) 0.4 MG capsule Take 1 capsule by mouth daily 90 capsule 3    simvastatin (ZOCOR) 10 MG tablet Take 1 tablet by mouth nightly New lower dose 90 tablet 2    polyethylene glycol (MIRALAX) powder Take 17 g by mouth daily 510 g 2    ranitidine (ZANTAC) 150 MG tablet Take 150 mg by mouth daily as needed for Heartburn      glucose blood VI test strips (ACCU-CHEK RUPA) strip Use to check blood sugar daily, Dx: E11.9 300 each 3    nitroGLYCERIN (NITROSTAT) 0.4 MG SL tablet Place 1 tablet under the tongue every 5 minutes as needed 25 tablet 5    Ascorbic Acid (VITAMIN C) 1000 MG tablet Take 1,000

## 2018-08-22 ENCOUNTER — NURSE ONLY (OUTPATIENT)
Dept: FAMILY MEDICINE CLINIC | Age: 83
End: 2018-08-22

## 2018-08-22 DIAGNOSIS — R79.89 LOW TESTOSTERONE: Primary | ICD-10-CM

## 2018-08-22 PROCEDURE — 96372 THER/PROPH/DIAG INJ SC/IM: CPT | Performed by: FAMILY MEDICINE

## 2018-08-22 RX ORDER — TESTOSTERONE CYPIONATE 200 MG/ML
200 INJECTION INTRAMUSCULAR ONCE
Status: COMPLETED | OUTPATIENT
Start: 2018-08-22 | End: 2018-08-22

## 2018-08-22 RX ADMIN — TESTOSTERONE CYPIONATE 200 MG: 200 INJECTION INTRAMUSCULAR at 11:23

## 2018-08-30 ENCOUNTER — HOSPITAL ENCOUNTER (OUTPATIENT)
Age: 83
Discharge: HOME | End: 2018-08-30
Payer: MEDICARE

## 2018-08-30 DIAGNOSIS — E03.4 HYPOTHYROIDISM DUE TO ACQUIRED ATROPHY OF THYROID: ICD-10-CM

## 2018-08-30 LAB
T4 FREE: 1.01 NG/DL (ref 0.93–1.76)
TSH SERPL DL<=0.05 MIU/L-ACNC: 3.48 UIU/ML (ref 0.4–4.2)

## 2018-08-30 PROCEDURE — 36415 COLL VENOUS BLD VENIPUNCTURE: CPT

## 2018-08-30 PROCEDURE — 84439 ASSAY OF FREE THYROXINE: CPT

## 2018-08-30 PROCEDURE — 84443 ASSAY THYROID STIM HORMONE: CPT

## 2018-08-31 ENCOUNTER — TELEPHONE (OUTPATIENT)
Dept: FAMILY MEDICINE CLINIC | Age: 83
End: 2018-08-31

## 2018-08-31 RX ORDER — LEVOTHYROXINE SODIUM 0.03 MG/1
25 TABLET ORAL DAILY
Qty: 30 TABLET | Refills: 0 | Status: SHIPPED | OUTPATIENT
Start: 2018-08-31 | End: 2018-09-28 | Stop reason: SDUPTHER

## 2018-09-24 ENCOUNTER — NURSE ONLY (OUTPATIENT)
Dept: FAMILY MEDICINE CLINIC | Age: 83
End: 2018-09-24

## 2018-09-24 DIAGNOSIS — E11.9 TYPE 2 DIABETES MELLITUS WITHOUT COMPLICATION, WITHOUT LONG-TERM CURRENT USE OF INSULIN (HCC): Primary | ICD-10-CM

## 2018-09-24 PROCEDURE — 96372 THER/PROPH/DIAG INJ SC/IM: CPT | Performed by: FAMILY MEDICINE

## 2018-09-24 RX ORDER — TESTOSTERONE CYPIONATE 200 MG/ML
200 INJECTION INTRAMUSCULAR ONCE
Status: COMPLETED | OUTPATIENT
Start: 2018-09-24 | End: 2018-09-24

## 2018-09-24 RX ADMIN — TESTOSTERONE CYPIONATE 200 MG: 200 INJECTION INTRAMUSCULAR at 10:45

## 2018-09-28 DIAGNOSIS — E03.4 HYPOTHYROIDISM DUE TO ACQUIRED ATROPHY OF THYROID: ICD-10-CM

## 2018-09-28 RX ORDER — LEVOTHYROXINE SODIUM 0.03 MG/1
25 TABLET ORAL DAILY
Qty: 30 TABLET | Refills: 0 | Status: SHIPPED | OUTPATIENT
Start: 2018-09-28 | End: 2018-10-23 | Stop reason: SDUPTHER

## 2018-09-28 NOTE — TELEPHONE ENCOUNTER
Spouse called req a #90 refill for  Levothyroxine 25 mcg.     [facility]    ALSO need a #30 short supply to last to  [facility]

## 2018-10-08 ENCOUNTER — CARE COORDINATION (OUTPATIENT)
Dept: CARE COORDINATION | Age: 83
End: 2018-10-08

## 2018-10-17 ENCOUNTER — OFFICE VISIT (OUTPATIENT)
Dept: INTERNAL MEDICINE CLINIC | Age: 83
End: 2018-10-17
Payer: MEDICARE

## 2018-10-17 ENCOUNTER — HOSPITAL ENCOUNTER (OUTPATIENT)
Age: 83
Discharge: HOME | End: 2018-10-17
Payer: MEDICARE

## 2018-10-17 ENCOUNTER — TELEPHONE (OUTPATIENT)
Dept: INTERNAL MEDICINE CLINIC | Age: 83
End: 2018-10-17

## 2018-10-17 VITALS
DIASTOLIC BLOOD PRESSURE: 70 MMHG | HEART RATE: 72 BPM | BODY MASS INDEX: 25.34 KG/M2 | WEIGHT: 177 LBS | HEIGHT: 70 IN | SYSTOLIC BLOOD PRESSURE: 130 MMHG

## 2018-10-17 DIAGNOSIS — I25.10 CORONARY ARTERY DISEASE INVOLVING NATIVE CORONARY ARTERY OF NATIVE HEART WITHOUT ANGINA PECTORIS: ICD-10-CM

## 2018-10-17 DIAGNOSIS — T50.905S ADVERSE EFFECT OF DRUG, SEQUELA: ICD-10-CM

## 2018-10-17 DIAGNOSIS — I10 ESSENTIAL HYPERTENSION: ICD-10-CM

## 2018-10-17 DIAGNOSIS — E11.9 TYPE 2 DIABETES MELLITUS WITHOUT COMPLICATION, WITHOUT LONG-TERM CURRENT USE OF INSULIN (HCC): ICD-10-CM

## 2018-10-17 DIAGNOSIS — E78.2 MIXED HYPERLIPIDEMIA: ICD-10-CM

## 2018-10-17 DIAGNOSIS — Z23 NEED FOR INFLUENZA VACCINATION: ICD-10-CM

## 2018-10-17 DIAGNOSIS — N28.9 RENAL INSUFFICIENCY: Primary | ICD-10-CM

## 2018-10-17 DIAGNOSIS — Z95.1 S/P CABG (CORONARY ARTERY BYPASS GRAFT): Primary | ICD-10-CM

## 2018-10-17 LAB
ANION GAP SERPL CALCULATED.3IONS-SCNC: 13 MEQ/L (ref 8–16)
BUN BLDV-MCNC: 35 MG/DL (ref 7–22)
CALCIUM SERPL-MCNC: 9.4 MG/DL (ref 8.5–10.5)
CHLORIDE BLD-SCNC: 102 MEQ/L (ref 98–111)
CO2: 25 MEQ/L (ref 23–33)
CREAT SERPL-MCNC: 1.3 MG/DL (ref 0.4–1.2)
GFR SERPL CREATININE-BSD FRML MDRD: 53 ML/MIN/1.73M2
GLUCOSE BLD-MCNC: 176 MG/DL (ref 70–108)
POTASSIUM SERPL-SCNC: 5.2 MEQ/L (ref 3.5–5.2)
SODIUM BLD-SCNC: 140 MEQ/L (ref 135–145)

## 2018-10-17 PROCEDURE — 4040F PNEUMOC VAC/ADMIN/RCVD: CPT | Performed by: INTERNAL MEDICINE

## 2018-10-17 PROCEDURE — 1101F PT FALLS ASSESS-DOCD LE1/YR: CPT | Performed by: INTERNAL MEDICINE

## 2018-10-17 PROCEDURE — 80048 BASIC METABOLIC PNL TOTAL CA: CPT

## 2018-10-17 PROCEDURE — 36415 COLL VENOUS BLD VENIPUNCTURE: CPT

## 2018-10-17 PROCEDURE — G0008 ADMIN INFLUENZA VIRUS VAC: HCPCS | Performed by: INTERNAL MEDICINE

## 2018-10-17 PROCEDURE — G8427 DOCREV CUR MEDS BY ELIG CLIN: HCPCS | Performed by: INTERNAL MEDICINE

## 2018-10-17 PROCEDURE — 90662 IIV NO PRSV INCREASED AG IM: CPT | Performed by: INTERNAL MEDICINE

## 2018-10-17 PROCEDURE — G8482 FLU IMMUNIZE ORDER/ADMIN: HCPCS | Performed by: INTERNAL MEDICINE

## 2018-10-17 PROCEDURE — 1036F TOBACCO NON-USER: CPT | Performed by: INTERNAL MEDICINE

## 2018-10-17 PROCEDURE — 99213 OFFICE O/P EST LOW 20 MIN: CPT | Performed by: INTERNAL MEDICINE

## 2018-10-17 PROCEDURE — G8598 ASA/ANTIPLAT THER USED: HCPCS | Performed by: INTERNAL MEDICINE

## 2018-10-17 PROCEDURE — G8417 CALC BMI ABV UP PARAM F/U: HCPCS | Performed by: INTERNAL MEDICINE

## 2018-10-17 PROCEDURE — 1123F ACP DISCUSS/DSCN MKR DOCD: CPT | Performed by: INTERNAL MEDICINE

## 2018-10-17 NOTE — PROGRESS NOTES
After obtaining consent, and per orders of Dr. Worthy Goodell injection of influenza vaccine given in left deltoid  by Tarik Escobar. Patient instructed to remain in clinic for 20 minutes afterwards, and to report any adverse reaction to me immediately.

## 2018-10-23 ENCOUNTER — NURSE ONLY (OUTPATIENT)
Dept: FAMILY MEDICINE CLINIC | Age: 83
End: 2018-10-23

## 2018-10-23 DIAGNOSIS — R79.89 LOW TESTOSTERONE: Primary | ICD-10-CM

## 2018-10-23 DIAGNOSIS — E03.4 HYPOTHYROIDISM DUE TO ACQUIRED ATROPHY OF THYROID: ICD-10-CM

## 2018-10-23 PROCEDURE — 96372 THER/PROPH/DIAG INJ SC/IM: CPT | Performed by: FAMILY MEDICINE

## 2018-10-23 RX ORDER — TESTOSTERONE CYPIONATE 200 MG/ML
200 INJECTION INTRAMUSCULAR ONCE
Status: COMPLETED | OUTPATIENT
Start: 2018-10-23 | End: 2018-10-23

## 2018-10-23 RX ADMIN — TESTOSTERONE CYPIONATE 200 MG: 200 INJECTION INTRAMUSCULAR at 11:54

## 2018-10-23 NOTE — TELEPHONE ENCOUNTER
Pt called for a refill of:    levothyroxine (SYNTHROID) 25 MCG tablet qd    Send 90 day supply to [facility]

## 2018-10-24 RX ORDER — LEVOTHYROXINE SODIUM 0.03 MG/1
25 TABLET ORAL DAILY
Qty: 90 TABLET | Refills: 1 | Status: SHIPPED | OUTPATIENT
Start: 2018-10-24 | End: 2018-10-30 | Stop reason: SDUPTHER

## 2018-10-25 ENCOUNTER — OFFICE VISIT (OUTPATIENT)
Dept: FAMILY MEDICINE CLINIC | Age: 83
End: 2018-10-25

## 2018-10-25 VITALS
HEIGHT: 70 IN | WEIGHT: 176.38 LBS | HEART RATE: 76 BPM | RESPIRATION RATE: 14 BRPM | DIASTOLIC BLOOD PRESSURE: 74 MMHG | SYSTOLIC BLOOD PRESSURE: 124 MMHG | BODY MASS INDEX: 25.25 KG/M2

## 2018-10-25 DIAGNOSIS — L30.9 DERMATITIS: Primary | ICD-10-CM

## 2018-10-25 PROCEDURE — 1101F PT FALLS ASSESS-DOCD LE1/YR: CPT | Performed by: FAMILY MEDICINE

## 2018-10-25 PROCEDURE — 99213 OFFICE O/P EST LOW 20 MIN: CPT | Performed by: FAMILY MEDICINE

## 2018-10-25 RX ORDER — TRIAMCINOLONE ACETONIDE 1 MG/G
CREAM TOPICAL
Qty: 60 G | Refills: 1 | Status: SHIPPED | OUTPATIENT
Start: 2018-10-25 | End: 2020-01-07 | Stop reason: ALTCHOICE

## 2018-10-25 ASSESSMENT — ENCOUNTER SYMPTOMS
EYE PAIN: 0
SHORTNESS OF BREATH: 0
BLOOD IN STOOL: 0
SORE THROAT: 0
NAUSEA: 0
COUGH: 0
CONSTIPATION: 0
CHEST TIGHTNESS: 0
TROUBLE SWALLOWING: 0
ABDOMINAL PAIN: 0
BACK PAIN: 0

## 2018-10-30 DIAGNOSIS — E03.4 HYPOTHYROIDISM DUE TO ACQUIRED ATROPHY OF THYROID: ICD-10-CM

## 2018-10-30 RX ORDER — LEVOTHYROXINE SODIUM 0.03 MG/1
25 TABLET ORAL DAILY
Qty: 20 TABLET | Refills: 0 | Status: SHIPPED | OUTPATIENT
Start: 2018-10-30 | End: 2018-11-19 | Stop reason: SDUPTHER

## 2018-10-31 RX ORDER — LEVOTHYROXINE SODIUM 0.03 MG/1
25 TABLET ORAL DAILY
Qty: 90 TABLET | Refills: 1 | Status: SHIPPED | OUTPATIENT
Start: 2018-10-31 | End: 2019-05-04 | Stop reason: SDUPTHER

## 2018-11-19 ENCOUNTER — OFFICE VISIT (OUTPATIENT)
Dept: FAMILY MEDICINE CLINIC | Age: 83
End: 2018-11-19

## 2018-11-19 VITALS
SYSTOLIC BLOOD PRESSURE: 120 MMHG | RESPIRATION RATE: 10 BRPM | DIASTOLIC BLOOD PRESSURE: 66 MMHG | WEIGHT: 178 LBS | BODY MASS INDEX: 25.48 KG/M2 | HEIGHT: 70 IN | HEART RATE: 72 BPM

## 2018-11-19 DIAGNOSIS — I10 ESSENTIAL HYPERTENSION: Primary | ICD-10-CM

## 2018-11-19 DIAGNOSIS — I25.10 CORONARY ARTERY DISEASE INVOLVING NATIVE CORONARY ARTERY OF NATIVE HEART WITHOUT ANGINA PECTORIS: ICD-10-CM

## 2018-11-19 DIAGNOSIS — E78.00 PURE HYPERCHOLESTEROLEMIA: ICD-10-CM

## 2018-11-19 DIAGNOSIS — N17.9 STAGE 3 ACUTE KIDNEY INJURY (HCC): ICD-10-CM

## 2018-11-19 DIAGNOSIS — R79.89 LOW TESTOSTERONE: ICD-10-CM

## 2018-11-19 DIAGNOSIS — E11.9 TYPE 2 DIABETES MELLITUS WITHOUT COMPLICATION, WITHOUT LONG-TERM CURRENT USE OF INSULIN (HCC): ICD-10-CM

## 2018-11-19 LAB
GLUCOSE BLD-MCNC: 39 MG/DL
HBA1C MFR BLD: 6.7 %

## 2018-11-19 PROCEDURE — 96372 THER/PROPH/DIAG INJ SC/IM: CPT | Performed by: FAMILY MEDICINE

## 2018-11-19 PROCEDURE — 1101F PT FALLS ASSESS-DOCD LE1/YR: CPT | Performed by: FAMILY MEDICINE

## 2018-11-19 PROCEDURE — 99213 OFFICE O/P EST LOW 20 MIN: CPT | Performed by: FAMILY MEDICINE

## 2018-11-19 PROCEDURE — 83036 HEMOGLOBIN GLYCOSYLATED A1C: CPT | Performed by: FAMILY MEDICINE

## 2018-11-19 PROCEDURE — 82962 GLUCOSE BLOOD TEST: CPT | Performed by: FAMILY MEDICINE

## 2018-11-19 RX ORDER — SIMVASTATIN 10 MG
10 TABLET ORAL NIGHTLY
Qty: 90 TABLET | Refills: 2 | Status: SHIPPED | OUTPATIENT
Start: 2018-11-19 | End: 2019-09-13 | Stop reason: SDUPTHER

## 2018-11-19 RX ORDER — TESTOSTERONE CYPIONATE 200 MG/ML
200 INJECTION INTRAMUSCULAR ONCE
Status: COMPLETED | OUTPATIENT
Start: 2018-11-19 | End: 2018-11-19

## 2018-11-19 RX ADMIN — TESTOSTERONE CYPIONATE 200 MG: 200 INJECTION INTRAMUSCULAR at 10:53

## 2018-11-19 ASSESSMENT — ENCOUNTER SYMPTOMS
CONSTIPATION: 0
BACK PAIN: 0
NAUSEA: 0
TROUBLE SWALLOWING: 0
COUGH: 0
SHORTNESS OF BREATH: 0
ABDOMINAL PAIN: 0
EYE PAIN: 0
BLOOD IN STOOL: 0
SORE THROAT: 0
CHEST TIGHTNESS: 0

## 2018-11-19 NOTE — PROGRESS NOTES
Subjective:      Patient ID: Davy Aiken is a 81 y.o. male. Stage  3  Kidney  Stable        ashd stable          Diabetes   He presents for his follow-up diabetic visit. He has type 2 diabetes mellitus. His disease course has been stable. There are no hypoglycemic associated symptoms. Pertinent negatives for hypoglycemia include no dizziness, headaches or sweats. Pertinent negatives for diabetes include no chest pain, no fatigue and no weakness. There are no hypoglycemic complications. Symptoms are stable. There are no diabetic complications. He is compliant with treatment none of the time. His weight is stable. Meal planning includes avoidance of concentrated sweets. There is no change in his home blood glucose trend. An ACE inhibitor/angiotensin II receptor blocker is being taken. Hypertension   The current episode started more than 1 year ago. The problem has been resolved since onset. The problem is controlled. Pertinent negatives include no chest pain, headaches, palpitations, peripheral edema, shortness of breath or sweats. The current treatment provides significant improvement. There are no compliance problems. Past Medical History:   Diagnosis Date    CAD (coronary artery disease)     Colon polyp     Hyperlipidemia     Hypertension     S/P CABG (coronary artery bypass graft)     Type II or unspecified type diabetes mellitus without mention of complication, not stated as uncontrolled      Review of Systems   Constitutional: Negative for fatigue and fever. HENT: Negative for congestion, ear pain, postnasal drip, sore throat and trouble swallowing. Eyes: Negative for pain. Respiratory: Negative for cough, chest tightness and shortness of breath. Cardiovascular: Negative for chest pain, palpitations and leg swelling. Gastrointestinal: Negative for abdominal pain, blood in stool, constipation and nausea. Genitourinary: Negative for difficulty urinating, frequency and urgency.

## 2018-12-12 ENCOUNTER — OFFICE VISIT (OUTPATIENT)
Dept: FAMILY MEDICINE CLINIC | Age: 83
End: 2018-12-12

## 2018-12-12 VITALS
WEIGHT: 180.5 LBS | BODY MASS INDEX: 25.84 KG/M2 | SYSTOLIC BLOOD PRESSURE: 132 MMHG | HEIGHT: 70 IN | DIASTOLIC BLOOD PRESSURE: 64 MMHG | HEART RATE: 80 BPM | RESPIRATION RATE: 14 BRPM

## 2018-12-12 DIAGNOSIS — S60.419A FINGER ABRASION, INITIAL ENCOUNTER: Primary | ICD-10-CM

## 2018-12-12 PROCEDURE — 1101F PT FALLS ASSESS-DOCD LE1/YR: CPT | Performed by: FAMILY MEDICINE

## 2018-12-12 PROCEDURE — 99213 OFFICE O/P EST LOW 20 MIN: CPT | Performed by: FAMILY MEDICINE

## 2018-12-12 NOTE — PROGRESS NOTES
MORNINGAND TAKE 2 TABLETS EVERY   EVENING 270 tablet 1    tamsulosin (FLOMAX) 0.4 MG capsule Take 1 capsule by mouth daily 90 capsule 3    polyethylene glycol (MIRALAX) powder Take 17 g by mouth daily 510 g 2    ranitidine (ZANTAC) 150 MG tablet Take 150 mg by mouth daily as needed for Heartburn      glucose blood VI test strips (ACCU-CHEK RUPA) strip Use to check blood sugar daily, Dx: E11.9 300 each 3    nitroGLYCERIN (NITROSTAT) 0.4 MG SL tablet Place 1 tablet under the tongue every 5 minutes as needed 25 tablet 5    Ascorbic Acid (VITAMIN C) 1000 MG tablet Take 1,000 mg by mouth daily.  aspirin EC 81 MG EC tablet Take 81 mg by mouth daily. No current facility-administered medications for this visit.              bactroban ointmnent   And  hibiclens and  See divina Echavarria MD

## 2018-12-19 ENCOUNTER — NURSE ONLY (OUTPATIENT)
Dept: FAMILY MEDICINE CLINIC | Age: 83
End: 2018-12-19

## 2018-12-19 DIAGNOSIS — R79.89 LOW TESTOSTERONE: Primary | ICD-10-CM

## 2018-12-19 PROCEDURE — 96372 THER/PROPH/DIAG INJ SC/IM: CPT | Performed by: FAMILY MEDICINE

## 2018-12-19 RX ORDER — TESTOSTERONE CYPIONATE 200 MG/ML
200 INJECTION INTRAMUSCULAR ONCE
Status: COMPLETED | OUTPATIENT
Start: 2018-12-19 | End: 2018-12-19

## 2018-12-19 RX ADMIN — TESTOSTERONE CYPIONATE 200 MG: 200 INJECTION INTRAMUSCULAR at 09:38

## 2019-01-15 ENCOUNTER — TELEPHONE (OUTPATIENT)
Dept: FAMILY MEDICINE CLINIC | Age: 84
End: 2019-01-15

## 2019-01-15 RX ORDER — BENZONATATE 200 MG/1
200 CAPSULE ORAL 3 TIMES DAILY PRN
Qty: 30 CAPSULE | Refills: 0 | Status: SHIPPED | OUTPATIENT
Start: 2019-01-15 | End: 2019-01-22

## 2019-01-15 RX ORDER — CEFUROXIME AXETIL 500 MG/1
500 TABLET ORAL 2 TIMES DAILY
Qty: 20 TABLET | Refills: 0 | Status: SHIPPED | OUTPATIENT
Start: 2019-01-15 | End: 2019-01-25

## 2019-01-28 ENCOUNTER — NURSE ONLY (OUTPATIENT)
Dept: FAMILY MEDICINE CLINIC | Age: 84
End: 2019-01-28

## 2019-01-28 DIAGNOSIS — R79.89 LOW TESTOSTERONE: Primary | ICD-10-CM

## 2019-01-28 DIAGNOSIS — M17.0 PRIMARY OSTEOARTHRITIS OF BOTH KNEES: ICD-10-CM

## 2019-01-28 DIAGNOSIS — E11.8 TYPE 2 DIABETES MELLITUS WITH COMPLICATION, WITHOUT LONG-TERM CURRENT USE OF INSULIN (HCC): ICD-10-CM

## 2019-01-28 DIAGNOSIS — I10 ESSENTIAL HYPERTENSION: ICD-10-CM

## 2019-01-28 PROCEDURE — 96372 THER/PROPH/DIAG INJ SC/IM: CPT | Performed by: FAMILY MEDICINE

## 2019-01-28 RX ORDER — TESTOSTERONE CYPIONATE 200 MG/ML
200 INJECTION INTRAMUSCULAR ONCE
Status: COMPLETED | OUTPATIENT
Start: 2019-01-28 | End: 2019-01-28

## 2019-01-28 RX ADMIN — TESTOSTERONE CYPIONATE 200 MG: 200 INJECTION INTRAMUSCULAR at 12:16

## 2019-01-29 RX ORDER — MELOXICAM 7.5 MG/1
TABLET ORAL
Qty: 90 TABLET | Refills: 1 | Status: SHIPPED | OUTPATIENT
Start: 2019-01-29 | End: 2019-09-16 | Stop reason: SDUPTHER

## 2019-01-29 RX ORDER — GLIMEPIRIDE 4 MG/1
TABLET ORAL
Qty: 90 TABLET | Refills: 1 | Status: SHIPPED | OUTPATIENT
Start: 2019-01-29 | End: 2019-11-04 | Stop reason: SDUPTHER

## 2019-02-22 ENCOUNTER — OFFICE VISIT (OUTPATIENT)
Dept: FAMILY MEDICINE CLINIC | Age: 84
End: 2019-02-22

## 2019-02-22 VITALS
RESPIRATION RATE: 16 BRPM | WEIGHT: 177.5 LBS | HEART RATE: 72 BPM | DIASTOLIC BLOOD PRESSURE: 66 MMHG | SYSTOLIC BLOOD PRESSURE: 120 MMHG | HEIGHT: 70 IN | BODY MASS INDEX: 25.41 KG/M2

## 2019-02-22 DIAGNOSIS — R79.89 LOW TESTOSTERONE: ICD-10-CM

## 2019-02-22 DIAGNOSIS — I25.10 CORONARY ARTERY DISEASE INVOLVING NATIVE CORONARY ARTERY OF NATIVE HEART WITHOUT ANGINA PECTORIS: ICD-10-CM

## 2019-02-22 DIAGNOSIS — I10 ESSENTIAL HYPERTENSION: ICD-10-CM

## 2019-02-22 DIAGNOSIS — E78.2 MIXED HYPERLIPIDEMIA: ICD-10-CM

## 2019-02-22 DIAGNOSIS — E11.9 TYPE 2 DIABETES MELLITUS WITHOUT COMPLICATION, WITHOUT LONG-TERM CURRENT USE OF INSULIN (HCC): Primary | ICD-10-CM

## 2019-02-22 DIAGNOSIS — E03.4 HYPOTHYROIDISM DUE TO ACQUIRED ATROPHY OF THYROID: ICD-10-CM

## 2019-02-22 DIAGNOSIS — N40.0 BENIGN PROSTATIC HYPERPLASIA WITHOUT LOWER URINARY TRACT SYMPTOMS: ICD-10-CM

## 2019-02-22 LAB
GLUCOSE BLD-MCNC: 96 MG/DL
HBA1C MFR BLD: 6.5 %

## 2019-02-22 PROCEDURE — 1101F PT FALLS ASSESS-DOCD LE1/YR: CPT | Performed by: FAMILY MEDICINE

## 2019-02-22 PROCEDURE — 99213 OFFICE O/P EST LOW 20 MIN: CPT | Performed by: FAMILY MEDICINE

## 2019-02-22 PROCEDURE — 1036F TOBACCO NON-USER: CPT | Performed by: FAMILY MEDICINE

## 2019-02-22 PROCEDURE — 4040F PNEUMOC VAC/ADMIN/RCVD: CPT | Performed by: FAMILY MEDICINE

## 2019-02-22 PROCEDURE — 82962 GLUCOSE BLOOD TEST: CPT | Performed by: FAMILY MEDICINE

## 2019-02-22 PROCEDURE — G8417 CALC BMI ABV UP PARAM F/U: HCPCS | Performed by: FAMILY MEDICINE

## 2019-02-22 PROCEDURE — G8427 DOCREV CUR MEDS BY ELIG CLIN: HCPCS | Performed by: FAMILY MEDICINE

## 2019-02-22 PROCEDURE — G8598 ASA/ANTIPLAT THER USED: HCPCS | Performed by: FAMILY MEDICINE

## 2019-02-22 PROCEDURE — 1123F ACP DISCUSS/DSCN MKR DOCD: CPT | Performed by: FAMILY MEDICINE

## 2019-02-22 PROCEDURE — 83036 HEMOGLOBIN GLYCOSYLATED A1C: CPT | Performed by: FAMILY MEDICINE

## 2019-02-22 ASSESSMENT — ENCOUNTER SYMPTOMS
ABDOMINAL PAIN: 0
COUGH: 0
BLOOD IN STOOL: 0
CHEST TIGHTNESS: 0
BACK PAIN: 0
NAUSEA: 0
TROUBLE SWALLOWING: 0
SHORTNESS OF BREATH: 0
CONSTIPATION: 0
SORE THROAT: 0
EYE PAIN: 0

## 2019-02-22 ASSESSMENT — PATIENT HEALTH QUESTIONNAIRE - PHQ9
1. LITTLE INTEREST OR PLEASURE IN DOING THINGS: 0
SUM OF ALL RESPONSES TO PHQ QUESTIONS 1-9: 0
SUM OF ALL RESPONSES TO PHQ QUESTIONS 1-9: 0
2. FEELING DOWN, DEPRESSED OR HOPELESS: 0
SUM OF ALL RESPONSES TO PHQ9 QUESTIONS 1 & 2: 0

## 2019-02-27 ENCOUNTER — HOSPITAL ENCOUNTER (OUTPATIENT)
Age: 84
Discharge: HOME | End: 2019-02-27
Payer: MEDICARE

## 2019-02-27 ENCOUNTER — NURSE ONLY (OUTPATIENT)
Dept: FAMILY MEDICINE CLINIC | Age: 84
End: 2019-02-27

## 2019-02-27 DIAGNOSIS — E78.2 MIXED HYPERLIPIDEMIA: ICD-10-CM

## 2019-02-27 DIAGNOSIS — R79.89 LOW TESTOSTERONE: Primary | ICD-10-CM

## 2019-02-27 DIAGNOSIS — R79.89 LOW TESTOSTERONE: ICD-10-CM

## 2019-02-27 DIAGNOSIS — N40.0 BENIGN PROSTATIC HYPERPLASIA WITHOUT LOWER URINARY TRACT SYMPTOMS: ICD-10-CM

## 2019-02-27 DIAGNOSIS — I10 ESSENTIAL HYPERTENSION: ICD-10-CM

## 2019-02-27 DIAGNOSIS — E03.4 HYPOTHYROIDISM DUE TO ACQUIRED ATROPHY OF THYROID: ICD-10-CM

## 2019-02-27 LAB
ALBUMIN SERPL-MCNC: 4.3 G/DL (ref 3.5–5.1)
ALP BLD-CCNC: 78 U/L (ref 38–126)
ALT SERPL-CCNC: 11 U/L (ref 11–66)
ANION GAP SERPL CALCULATED.3IONS-SCNC: 12 MEQ/L (ref 8–16)
AST SERPL-CCNC: 12 U/L (ref 5–40)
BASOPHILS # BLD: 1.1 %
BASOPHILS ABSOLUTE: 0.1 THOU/MM3 (ref 0–0.1)
BILIRUB SERPL-MCNC: 1 MG/DL (ref 0.3–1.2)
BUN BLDV-MCNC: 33 MG/DL (ref 7–22)
CALCIUM SERPL-MCNC: 9.6 MG/DL (ref 8.5–10.5)
CHLORIDE BLD-SCNC: 103 MEQ/L (ref 98–111)
CHOLESTEROL, TOTAL: 103 MG/DL (ref 100–199)
CO2: 26 MEQ/L (ref 23–33)
CREAT SERPL-MCNC: 1.1 MG/DL (ref 0.4–1.2)
EOSINOPHIL # BLD: 5.8 %
EOSINOPHILS ABSOLUTE: 0.4 THOU/MM3 (ref 0–0.4)
ERYTHROCYTE [DISTWIDTH] IN BLOOD BY AUTOMATED COUNT: 12.4 % (ref 11.5–14.5)
ERYTHROCYTE [DISTWIDTH] IN BLOOD BY AUTOMATED COUNT: 43.1 FL (ref 35–45)
GFR SERPL CREATININE-BSD FRML MDRD: 64 ML/MIN/1.73M2
GLUCOSE BLD-MCNC: 141 MG/DL (ref 70–108)
HCT VFR BLD CALC: 45.1 % (ref 42–52)
HDLC SERPL-MCNC: 37 MG/DL
HEMOGLOBIN: 14.5 GM/DL (ref 14–18)
IMMATURE GRANS (ABS): 0.01 THOU/MM3 (ref 0–0.07)
IMMATURE GRANULOCYTES: 0.2 %
LDL CHOLESTEROL CALCULATED: 51 MG/DL
LYMPHOCYTES # BLD: 18 %
LYMPHOCYTES ABSOLUTE: 1.2 THOU/MM3 (ref 1–4.8)
MCH RBC QN AUTO: 30.4 PG (ref 26–33)
MCHC RBC AUTO-ENTMCNC: 32.2 GM/DL (ref 32.2–35.5)
MCV RBC AUTO: 94.5 FL (ref 80–94)
MONOCYTES # BLD: 9 %
MONOCYTES ABSOLUTE: 0.6 THOU/MM3 (ref 0.4–1.3)
NUCLEATED RED BLOOD CELLS: 0 /100 WBC
PLATELET # BLD: 157 THOU/MM3 (ref 130–400)
PMV BLD AUTO: 9.4 FL (ref 9.4–12.4)
POTASSIUM SERPL-SCNC: 4.8 MEQ/L (ref 3.5–5.2)
PROSTATE SPECIFIC ANTIGEN: 0.54 NG/ML (ref 0–1)
RBC # BLD: 4.77 MILL/MM3 (ref 4.7–6.1)
SEG NEUTROPHILS: 65.9 %
SEGMENTED NEUTROPHILS ABSOLUTE COUNT: 4.2 THOU/MM3 (ref 1.8–7.7)
SODIUM BLD-SCNC: 141 MEQ/L (ref 135–145)
T4 FREE: 1.09 NG/DL (ref 0.93–1.76)
TOTAL PROTEIN: 6.4 G/DL (ref 6.1–8)
TRIGL SERPL-MCNC: 77 MG/DL (ref 0–199)
TSH SERPL DL<=0.05 MIU/L-ACNC: 3.61 UIU/ML (ref 0.4–4.2)
WBC # BLD: 6.4 THOU/MM3 (ref 4.8–10.8)

## 2019-02-27 PROCEDURE — 80061 LIPID PANEL: CPT

## 2019-02-27 PROCEDURE — 80053 COMPREHEN METABOLIC PANEL: CPT

## 2019-02-27 PROCEDURE — 36415 COLL VENOUS BLD VENIPUNCTURE: CPT

## 2019-02-27 PROCEDURE — 85025 COMPLETE CBC W/AUTO DIFF WBC: CPT

## 2019-02-27 PROCEDURE — 96372 THER/PROPH/DIAG INJ SC/IM: CPT | Performed by: FAMILY MEDICINE

## 2019-02-27 PROCEDURE — 84443 ASSAY THYROID STIM HORMONE: CPT

## 2019-02-27 PROCEDURE — 84153 ASSAY OF PSA TOTAL: CPT

## 2019-02-27 PROCEDURE — 84439 ASSAY OF FREE THYROXINE: CPT

## 2019-02-27 RX ORDER — TESTOSTERONE CYPIONATE 200 MG/ML
200 INJECTION INTRAMUSCULAR ONCE
Status: COMPLETED | OUTPATIENT
Start: 2019-02-27 | End: 2019-02-27

## 2019-02-27 RX ADMIN — TESTOSTERONE CYPIONATE 200 MG: 200 INJECTION INTRAMUSCULAR at 09:03

## 2019-02-28 ENCOUNTER — TELEPHONE (OUTPATIENT)
Dept: FAMILY MEDICINE CLINIC | Age: 84
End: 2019-02-28

## 2019-03-06 ENCOUNTER — HOSPITAL ENCOUNTER (EMERGENCY)
Age: 84
Discharge: HOME | End: 2019-03-06
Payer: MEDICARE

## 2019-03-06 VITALS
OXYGEN SATURATION: 98 % | RESPIRATION RATE: 16 BRPM | HEART RATE: 78 BPM | SYSTOLIC BLOOD PRESSURE: 154 MMHG | TEMPERATURE: 98.3 F | DIASTOLIC BLOOD PRESSURE: 82 MMHG
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
## 2019-03-06 PROCEDURE — 90715 TDAP VACCINE 7 YRS/> IM: CPT | Performed by: NURSE PRACTITIONER

## 2019-03-06 PROCEDURE — 2709999900 HC NON-CHARGEABLE SUPPLY

## 2019-03-06 PROCEDURE — 99213 OFFICE O/P EST LOW 20 MIN: CPT | Performed by: NURSE PRACTITIONER

## 2019-03-06 PROCEDURE — 6360000002 HC RX W HCPCS: Performed by: NURSE PRACTITIONER

## 2019-03-06 PROCEDURE — 99212 OFFICE O/P EST SF 10 MIN: CPT

## 2019-03-06 PROCEDURE — 90471 IMMUNIZATION ADMIN: CPT | Performed by: NURSE PRACTITIONER

## 2019-03-06 RX ORDER — AMOXICILLIN AND CLAVULANATE POTASSIUM 875; 125 MG/1; MG/1
1 TABLET, FILM COATED ORAL 2 TIMES DAILY
Qty: 20 TABLET | Refills: 0 | Status: SHIPPED | OUTPATIENT
Start: 2019-03-06 | End: 2019-03-16

## 2019-03-06 RX ADMIN — TETANUS TOXOID, REDUCED DIPHTHERIA TOXOID AND ACELLULAR PERTUSSIS VACCINE, ADSORBED 0.5 ML: 5; 2.5; 8; 8; 2.5 SUSPENSION INTRAMUSCULAR at 11:47

## 2019-03-06 ASSESSMENT — ENCOUNTER SYMPTOMS
VOMITING: 0
WHEEZING: 0
ABDOMINAL PAIN: 0
SHORTNESS OF BREATH: 0
CHEST TIGHTNESS: 0
NAUSEA: 0

## 2019-04-02 ENCOUNTER — HOSPITAL ENCOUNTER (OUTPATIENT)
Dept: GENERAL RADIOLOGY | Age: 84
Discharge: HOME | End: 2019-04-02
Payer: MEDICARE

## 2019-04-02 ENCOUNTER — HOSPITAL ENCOUNTER (OUTPATIENT)
Age: 84
Discharge: HOME | End: 2019-04-02
Payer: MEDICARE

## 2019-04-02 ENCOUNTER — OFFICE VISIT (OUTPATIENT)
Dept: FAMILY MEDICINE CLINIC | Age: 84
End: 2019-04-02

## 2019-04-02 VITALS
RESPIRATION RATE: 10 BRPM | WEIGHT: 180.13 LBS | HEART RATE: 72 BPM | SYSTOLIC BLOOD PRESSURE: 126 MMHG | DIASTOLIC BLOOD PRESSURE: 74 MMHG | HEIGHT: 70 IN | BODY MASS INDEX: 25.79 KG/M2

## 2019-04-02 DIAGNOSIS — M25.50 ARTHRALGIA, UNSPECIFIED JOINT: ICD-10-CM

## 2019-04-02 DIAGNOSIS — M54.42 ACUTE BILATERAL LOW BACK PAIN WITH BILATERAL SCIATICA: ICD-10-CM

## 2019-04-02 DIAGNOSIS — M54.41 ACUTE BILATERAL LOW BACK PAIN WITH BILATERAL SCIATICA: ICD-10-CM

## 2019-04-02 DIAGNOSIS — M54.41 ACUTE BILATERAL LOW BACK PAIN WITH BILATERAL SCIATICA: Primary | ICD-10-CM

## 2019-04-02 DIAGNOSIS — M54.42 ACUTE BILATERAL LOW BACK PAIN WITH BILATERAL SCIATICA: Primary | ICD-10-CM

## 2019-04-02 LAB
C-REACTIVE PROTEIN: 0.05 MG/DL (ref 0–1)
RHEUMATOID FACTOR: < 10 IU/ML (ref 0–13)
SEDIMENTATION RATE, ERYTHROCYTE: 4 MM/HR (ref 0–10)
URIC ACID: 6.2 MG/DL (ref 3.7–7)

## 2019-04-02 PROCEDURE — 86431 RHEUMATOID FACTOR QUANT: CPT

## 2019-04-02 PROCEDURE — 1036F TOBACCO NON-USER: CPT | Performed by: FAMILY MEDICINE

## 2019-04-02 PROCEDURE — 86039 ANTINUCLEAR ANTIBODIES (ANA): CPT

## 2019-04-02 PROCEDURE — 86140 C-REACTIVE PROTEIN: CPT

## 2019-04-02 PROCEDURE — 72170 X-RAY EXAM OF PELVIS: CPT

## 2019-04-02 PROCEDURE — G8417 CALC BMI ABV UP PARAM F/U: HCPCS | Performed by: FAMILY MEDICINE

## 2019-04-02 PROCEDURE — 99213 OFFICE O/P EST LOW 20 MIN: CPT | Performed by: FAMILY MEDICINE

## 2019-04-02 PROCEDURE — 72110 X-RAY EXAM L-2 SPINE 4/>VWS: CPT

## 2019-04-02 PROCEDURE — G8427 DOCREV CUR MEDS BY ELIG CLIN: HCPCS | Performed by: FAMILY MEDICINE

## 2019-04-02 PROCEDURE — 36415 COLL VENOUS BLD VENIPUNCTURE: CPT

## 2019-04-02 PROCEDURE — 4040F PNEUMOC VAC/ADMIN/RCVD: CPT | Performed by: FAMILY MEDICINE

## 2019-04-02 PROCEDURE — 86038 ANTINUCLEAR ANTIBODIES: CPT

## 2019-04-02 PROCEDURE — 85651 RBC SED RATE NONAUTOMATED: CPT

## 2019-04-02 PROCEDURE — 84550 ASSAY OF BLOOD/URIC ACID: CPT

## 2019-04-02 PROCEDURE — 1123F ACP DISCUSS/DSCN MKR DOCD: CPT | Performed by: FAMILY MEDICINE

## 2019-04-02 PROCEDURE — 84160 ASSAY OF PROTEIN ANY SOURCE: CPT

## 2019-04-02 PROCEDURE — G8598 ASA/ANTIPLAT THER USED: HCPCS | Performed by: FAMILY MEDICINE

## 2019-04-02 PROCEDURE — 84165 PROTEIN E-PHORESIS SERUM: CPT

## 2019-04-02 RX ORDER — PREDNISONE 20 MG/1
TABLET ORAL
Qty: 24 TABLET | Refills: 0 | Status: SHIPPED | OUTPATIENT
Start: 2019-04-02 | End: 2019-04-23 | Stop reason: ALTCHOICE

## 2019-04-02 RX ORDER — TIZANIDINE 4 MG/1
4 TABLET ORAL EVERY 8 HOURS PRN
Qty: 30 TABLET | Refills: 1 | Status: SHIPPED | OUTPATIENT
Start: 2019-04-02 | End: 2019-04-23

## 2019-04-02 ASSESSMENT — ENCOUNTER SYMPTOMS
BACK PAIN: 1
TROUBLE SWALLOWING: 0
SORE THROAT: 0
ABDOMINAL PAIN: 0
EYE PAIN: 0
CHEST TIGHTNESS: 0
BLOOD IN STOOL: 0
NAUSEA: 0
CONSTIPATION: 0
COUGH: 0
SHORTNESS OF BREATH: 0

## 2019-04-02 NOTE — PROGRESS NOTES
Subjective:      Patient ID: Marible Duran is a 81 y.o. male. Ice  To  Back      Pain  To  Left leg  Also       left  Shoulder  With  Pain      Saw chiropracter     Back Pain   This is a new problem. The current episode started 1 to 4 weeks ago. The problem has been gradually worsening since onset. The pain is present in the lumbar spine. The pain radiates to the right foot and right thigh. The pain is at a severity of 4/10. The pain is mild. The symptoms are aggravated by position and sitting. Stiffness is present all day. Pertinent negatives include no abdominal pain, chest pain, fever, headaches or weakness. He has tried ice for the symptoms. The treatment provided mild relief. Past Medical History:   Diagnosis Date    CAD (coronary artery disease)     Colon polyp     Hyperlipidemia     Hypertension     S/P CABG (coronary artery bypass graft)     Type II or unspecified type diabetes mellitus without mention of complication, not stated as uncontrolled        Review of Systems   Constitutional: Negative for fatigue and fever. HENT: Negative for congestion, ear pain, postnasal drip, sore throat and trouble swallowing. Eyes: Negative for pain. Respiratory: Negative for cough, chest tightness and shortness of breath. Cardiovascular: Negative for chest pain, palpitations and leg swelling. Gastrointestinal: Negative for abdominal pain, blood in stool, constipation and nausea. Genitourinary: Negative for difficulty urinating, frequency and urgency. Musculoskeletal: Positive for back pain. Negative for arthralgias, joint swelling and neck stiffness. Skin: Negative for rash. Neurological: Negative for dizziness, weakness and headaches. Hematological: Negative for adenopathy. Does not bruise/bleed easily. Psychiatric/Behavioral: Negative for behavioral problems, dysphoric mood and sleep disturbance.      /74 (Site: Right Upper Arm, Position: Sitting, Cuff Size: Medium Adult) Pulse 72   Resp 10   Ht 5' 10\" (1.778 m)   Wt 180 lb 2 oz (81.7 kg)   BMI 25.85 kg/m²   Objective:   Physical Exam   Constitutional: He is oriented to person, place, and time. He appears well-developed and well-nourished. HENT:   Head: Normocephalic and atraumatic. Right Ear: External ear normal.   Left Ear: External ear normal.   Nose: Nose normal.   Mouth/Throat: Oropharynx is clear and moist.   Eyes: Pupils are equal, round, and reactive to light. Conjunctivae and EOM are normal.   Fundi nl   Neck: Normal range of motion. Neck supple. No thyromegaly present. Cardiovascular: Normal rate, regular rhythm, normal heart sounds and intact distal pulses. Pulmonary/Chest: Effort normal and breath sounds normal. He has no wheezes. He has no rales. Abdominal: Soft. Bowel sounds are normal. He exhibits no mass. There is no tenderness. Hernia confirmed negative in the right inguinal area and confirmed negative in the left inguinal area. Genitourinary: Prostate normal, testes normal and penis normal. Rectal exam shows no mass, anal tone normal and guaiac negative stool. Musculoskeletal: Normal range of motion. Low  Back pain  Noted and pain of  The knees    Lymphadenopathy:     He has no cervical adenopathy. Neurological: He is alert and oriented to person, place, and time. He has normal reflexes. No cranial nerve deficit. Skin: Skin is warm and dry. No rash noted. Psychiatric: He has a normal mood and affect. Nursing note and vitals reviewed. Assessment:       Diagnosis Orders   1. Acute bilateral low back pain with bilateral sciatica  C-Reactive Protein    Sedimentation Rate    XR PELVIS (1-2 VIEWS)    XR LUMBAR SPINE (MIN 4 VIEWS)   2.  Arthralgia, unspecified joint  DELANEY Screen with Reflex    C-Reactive Protein    Rheumatoid Factor    Uric Acid    Sedimentation Rate    Electrophoresis Protein, Serum without Reflex to Immunofixation    XR PELVIS (1-2 VIEWS)    XR LUMBAR SPINE (MIN 4 VIEWS) Plan:      Current Outpatient Medications   Medication Sig Dispense Refill    predniSONE (DELTASONE) 20 MG tablet One tablet t.i.d. For 3 days then one tablet b.i.d. For 5 days then one a day for 5 days 24 tablet 0    tiZANidine (ZANAFLEX) 4 MG tablet Take 1 tablet by mouth every 8 hours as needed (muscle  spasm) 30 tablet 1    metoprolol tartrate (LOPRESSOR) 25 MG tablet TAKE 1/2 TABLET TWICE A DAY 90 tablet 1    meloxicam (MOBIC) 7.5 MG tablet TAKE 1 TABLET DAILY 90 tablet 1    metFORMIN (GLUCOPHAGE) 500 MG tablet TAKE 1 TABLET EVERY MORNINGAND TAKE 2 TABLETS EVERY   EVENING 270 tablet 1    glimepiride (AMARYL) 4 MG tablet TAKE 1/2 TABLET TWICE A DAY 90 tablet 1    simvastatin (ZOCOR) 10 MG tablet Take 1 tablet by mouth nightly New lower dose 90 tablet 2    levothyroxine (SYNTHROID) 25 MCG tablet Take 1 tablet by mouth Daily 90 tablet 1    triamcinolone (KENALOG) 0.1 % cream Apply topically 2 times daily. 60 g 1    enalapril (VASOTEC) 2.5 MG tablet Take 1 tablet by mouth every other day New lower dose one daily 90 tablet 1    tamsulosin (FLOMAX) 0.4 MG capsule Take 1 capsule by mouth daily 90 capsule 3    polyethylene glycol (MIRALAX) powder Take 17 g by mouth daily 510 g 2    ranitidine (ZANTAC) 150 MG tablet Take 150 mg by mouth daily as needed for Heartburn      glucose blood VI test strips (ACCU-CHEK RUPA) strip Use to check blood sugar daily, Dx: E11.9 300 each 3    nitroGLYCERIN (NITROSTAT) 0.4 MG SL tablet Place 1 tablet under the tongue every 5 minutes as needed 25 tablet 5    Ascorbic Acid (VITAMIN C) 1000 MG tablet Take 1,000 mg by mouth daily.  aspirin EC 81 MG EC tablet Take 81 mg by mouth daily. No current facility-administered medications for this visit.       Orders Placed This Encounter   Procedures    XR PELVIS (1-2 VIEWS)     Standing Status:   Future     Standing Expiration Date:   4/2/2020     Order Specific Question:   Reason for exam:     Answer:   back and  hip pain    XR LUMBAR SPINE (MIN 4 VIEWS)     Standing Status:   Future     Standing Expiration Date:   4/2/2020     Order Specific Question:   Reason for exam:     Answer:   back pain  with  right more than  left  radiculopathy    DELANEY Screen with Reflex     Standing Status:   Future     Standing Expiration Date:   4/1/2020    C-Reactive Protein     Standing Status:   Future     Standing Expiration Date:   4/1/2020    Rheumatoid Factor     Standing Status:   Future     Standing Expiration Date:   4/1/2020    Uric Acid     Standing Status:   Future     Standing Expiration Date:   4/1/2020    Sedimentation Rate     Standing Status:   Future     Standing Expiration Date:   4/1/2020    Electrophoresis Protein, Serum without Reflex to Immunofixation     Standing Status:   Future     Standing Expiration Date:   4/1/2020       . No results found for this visit on 04/02/19.       See  Jorge Enrique MD

## 2019-04-03 ENCOUNTER — TELEPHONE (OUTPATIENT)
Dept: FAMILY MEDICINE CLINIC | Age: 84
End: 2019-04-03

## 2019-04-03 NOTE — TELEPHONE ENCOUNTER
----- Message from Perri Falcon MD sent at 4/3/2019  6:39 AM EDT -----  Not  rheumatoid and await xray

## 2019-04-03 NOTE — TELEPHONE ENCOUNTER
----- Message from Ronal Hitchcock MD sent at 4/3/2019  6:46 AM EDT -----  Also  Xray show  Hips mild arthritis and mostly the  Back. See how  Prednisone helps back and if  Persist then see dr Baron.  Please  call

## 2019-04-06 LAB
ANA SCREEN: DETECTED
ANTINUCLEAR AB INTERPRETIVE COMMENT: NORMAL
ANTINUCLEAR ANTIBODY, HEP-2, IGG: NORMAL

## 2019-04-07 LAB — PROTEIN ELECTROPHORESIS, SERUM: NORMAL

## 2019-04-08 ENCOUNTER — TELEPHONE (OUTPATIENT)
Dept: FAMILY MEDICINE CLINIC | Age: 84
End: 2019-04-08

## 2019-04-08 NOTE — TELEPHONE ENCOUNTER
----- Message from Grazyna Guan MD sent at 4/8/2019  5:06 AM EDT -----  Rheumatoid arthritis and gout all negative  And carlyle is positive and this shows lupus however 10 to 15% of population are positive and do not have lupus. Is the prednisone helping ? Susan June

## 2019-04-23 ENCOUNTER — NURSE ONLY (OUTPATIENT)
Dept: FAMILY MEDICINE CLINIC | Age: 84
End: 2019-04-23

## 2019-04-23 ENCOUNTER — OFFICE VISIT (OUTPATIENT)
Dept: INTERNAL MEDICINE CLINIC | Age: 84
End: 2019-04-23
Payer: MEDICARE

## 2019-04-23 VITALS
HEIGHT: 70 IN | SYSTOLIC BLOOD PRESSURE: 134 MMHG | WEIGHT: 184.5 LBS | BODY MASS INDEX: 26.41 KG/M2 | DIASTOLIC BLOOD PRESSURE: 80 MMHG

## 2019-04-23 DIAGNOSIS — R79.89 LOW TESTOSTERONE: Primary | ICD-10-CM

## 2019-04-23 DIAGNOSIS — I10 ESSENTIAL HYPERTENSION: Primary | ICD-10-CM

## 2019-04-23 DIAGNOSIS — I25.10 CORONARY ARTERY DISEASE INVOLVING NATIVE CORONARY ARTERY OF NATIVE HEART WITHOUT ANGINA PECTORIS: ICD-10-CM

## 2019-04-23 PROCEDURE — 93000 ELECTROCARDIOGRAM COMPLETE: CPT | Performed by: INTERNAL MEDICINE

## 2019-04-23 PROCEDURE — 1036F TOBACCO NON-USER: CPT | Performed by: INTERNAL MEDICINE

## 2019-04-23 PROCEDURE — 96372 THER/PROPH/DIAG INJ SC/IM: CPT | Performed by: FAMILY MEDICINE

## 2019-04-23 PROCEDURE — 4040F PNEUMOC VAC/ADMIN/RCVD: CPT | Performed by: INTERNAL MEDICINE

## 2019-04-23 PROCEDURE — G8598 ASA/ANTIPLAT THER USED: HCPCS | Performed by: INTERNAL MEDICINE

## 2019-04-23 PROCEDURE — 1123F ACP DISCUSS/DSCN MKR DOCD: CPT | Performed by: INTERNAL MEDICINE

## 2019-04-23 PROCEDURE — 99213 OFFICE O/P EST LOW 20 MIN: CPT | Performed by: INTERNAL MEDICINE

## 2019-04-23 PROCEDURE — G8427 DOCREV CUR MEDS BY ELIG CLIN: HCPCS | Performed by: INTERNAL MEDICINE

## 2019-04-23 PROCEDURE — G8417 CALC BMI ABV UP PARAM F/U: HCPCS | Performed by: INTERNAL MEDICINE

## 2019-04-23 RX ORDER — TESTOSTERONE CYPIONATE 200 MG/ML
200 INJECTION INTRAMUSCULAR ONCE
Status: COMPLETED | OUTPATIENT
Start: 2019-04-23 | End: 2019-04-23

## 2019-04-23 RX ADMIN — TESTOSTERONE CYPIONATE 200 MG: 200 INJECTION INTRAMUSCULAR at 14:57

## 2019-04-23 NOTE — PROGRESS NOTES
YOB: 1935     Chief Complaint   Patient presents with    Pre-op Exam     This patient was referred to me by Dr. Arleen Cameron for pre-op evaluation prior to knee surgery which is scheduled for ? at [address]? The pt has a hx of OA knee x yr; it has become limiting and surgery has been proposed. He is being seen for medical evaluation. He has a hx of cad with cabg, multiple vessel in 2009. He had a negative stress in 2014. He notes no chest pain but does get sob climbing stairs. He is an exsmoker. He has never had a stroke or chf. .       Past Medical History:   Diagnosis Date    CAD (coronary artery disease)     Colon polyp     Hyperlipidemia     Hypertension     S/P CABG (coronary artery bypass graft)     Type II or unspecified type diabetes mellitus without mention of complication, not stated as uncontrolled        Past Surgical History:   Procedure Laterality Date    CARPAL TUNNEL RELEASE  1995 and 1995    left and  right     COLONOSCOPY  2009    dr. Bob Kaye and due 2014    CORONARY ARTERY BYPASS GRAFT  2005    HERNIA REPAIR         Social History     Socioeconomic History    Marital status:      Spouse name: Not on file    Number of children: Not on file    Years of education: Not on file    Highest education level: Not on file   Occupational History    Not on file   Social Needs    Financial resource strain: Not on file    Food insecurity:     Worry: Not on file     Inability: Not on file    Transportation needs:     Medical: Not on file     Non-medical: Not on file   Tobacco Use    Smoking status: Never Smoker    Smokeless tobacco: Never Used   Substance and Sexual Activity    Alcohol use:  Yes     Alcohol/week: 0.0 oz     Comment: very rare    Drug use: No    Sexual activity: Not on file   Lifestyle    Physical activity:     Days per week: Not on file     Minutes per session: Not on file    Stress: Not on file   Relationships    Social connections:     Talks on phone: Not on file     Gets together: Not on file     Attends Zoroastrianism service: Not on file     Active member of club or organization: Not on file     Attends meetings of clubs or organizations: Not on file     Relationship status: Not on file    Intimate partner violence:     Fear of current or ex partner: Not on file     Emotionally abused: Not on file     Physically abused: Not on file     Forced sexual activity: Not on file   Other Topics Concern    Not on file   Social History Narrative    Not on file       Current Outpatient Medications   Medication Sig Dispense Refill    metoprolol tartrate (LOPRESSOR) 25 MG tablet TAKE 1 TABLET TWICE A  tablet 1    meloxicam (MOBIC) 7.5 MG tablet TAKE 1 TABLET DAILY 90 tablet 1    metFORMIN (GLUCOPHAGE) 500 MG tablet TAKE 1 TABLET EVERY MORNINGAND TAKE 2 TABLETS EVERY   EVENING 270 tablet 1    glimepiride (AMARYL) 4 MG tablet TAKE 1/2 TABLET TWICE A DAY 90 tablet 1    simvastatin (ZOCOR) 10 MG tablet Take 1 tablet by mouth nightly New lower dose 90 tablet 2    levothyroxine (SYNTHROID) 25 MCG tablet Take 1 tablet by mouth Daily 90 tablet 1    triamcinolone (KENALOG) 0.1 % cream Apply topically 2 times daily. 60 g 1    enalapril (VASOTEC) 2.5 MG tablet Take 1 tablet by mouth every other day New lower dose one daily 90 tablet 1    tamsulosin (FLOMAX) 0.4 MG capsule Take 1 capsule by mouth daily 90 capsule 3    polyethylene glycol (MIRALAX) powder Take 17 g by mouth daily 510 g 2    ranitidine (ZANTAC) 150 MG tablet Take 150 mg by mouth daily as needed for Heartburn      glucose blood VI test strips (ACCU-CHEK RUPA) strip Use to check blood sugar daily, Dx: E11.9 300 each 3    nitroGLYCERIN (NITROSTAT) 0.4 MG SL tablet Place 1 tablet under the tongue every 5 minutes as needed 25 tablet 5    Ascorbic Acid (VITAMIN C) 1000 MG tablet Take 1,000 mg by mouth daily.  aspirin EC 81 MG EC tablet Take 81 mg by mouth daily.          No current facility-administered medications for this visit. No Known Allergies    Review of Systems - General ROS: negative  Psychological ROS: negative  Hematological and Lymphatic ROS: No history of blood clots or bleeding disorder. Respiratory ROS: positive for - shortness of breath  Cardiovascular ROS: negative for - chest pain  Gastrointestinal ROS: no abdominal pain, change in bowel habits, or black or bloody stools  Genito-Urinary ROS: no dysuria, trouble voiding, or hematuria  Musculoskeletal ROS: negative for - gait disturbance  Neurological ROS: gets dizzy when looking up  Dermatological ROS: negative    This patient has never experienced difficulty with general anesthetic. Blood pressure 134/80, height 5' 10\" (1.778 m), weight 184 lb 8 oz (83.7 kg). Physical Examination: General appearance - alert, well appearing, and in no distress  Mental status - alert, oriented to person, place, and time  Neck - Neck is supple, no JVD or carotid bruits. No thyromegaly or adenopathy. Chest - clear to auscultation, no wheezes, rales or rhonchi, symmetric air entry  Heart - normal rate, regular rhythm, normal S1, S2, no murmurs, rubs, clicks or gallops  Abdomen - soft, nontender, nondistended, no masses or organomegaly  Neurological - alert, oriented, normal speech, no focal findings or movement disorder noted  Musculoskeletal - no joint tenderness, deformity or swelling  Extremities - peripheral pulses normal, no pedal edema, no clubbing or cyanosis  Skin - normal coloration and turgor, no rashes, no suspicious skin lesions noted    I have reviewed recent diagnostic testing including bmp, lipids. Diagnosis Orders   1. Essential hypertension  EKG 12 Lead    metoprolol tartrate (LOPRESSOR) 25 MG tablet   2.  Coronary artery disease involving native coronary artery of native heart without angina pectoris  EKG 12 Lead    Stress test, lexiscan           Orders Placed This Encounter   Procedures    Stress test, lexiscan     Order Specific Question:   Reason for Exam?     Answer:   Pre-op    EKG 12 Lead     Order Specific Question:   Reason for Exam?     Answer: Other      Imp: preliminarily he appears to be an adequate risk for surgery, but due to the time lapse since his last stress we will plan pharmacologic stress test.  Dr Tori Murrieta will be contacted re the results. He will increase his beta blocker to afford more cardiac protection. Signed by:  Camila Van M.D.

## 2019-04-23 NOTE — PROGRESS NOTES
After obtaining consent, and per orders of Dr. Pan Villalba, injection of Testosterone 200 mg given in Left upper quad. gluteus by Mary Ellen Michaels. Patient instructed to remain in clinic for 20 minutes afterwards, and to report any adverse reaction to me immediately.

## 2019-05-04 DIAGNOSIS — E03.4 HYPOTHYROIDISM DUE TO ACQUIRED ATROPHY OF THYROID: ICD-10-CM

## 2019-05-06 NOTE — TELEPHONE ENCOUNTER
Date of last visit:  4/2/2019  Date of next visit:  5/28/2019    Requested Prescriptions     Pending Prescriptions Disp Refills    levothyroxine (SYNTHROID) 25 MCG tablet [Pharmacy Med Name: LEVOTHYROXIN TAB 0.025MG] 90 tablet 1     Sig: TAKE 1 TABLET DAILY

## 2019-05-07 RX ORDER — LEVOTHYROXINE SODIUM 0.03 MG/1
TABLET ORAL
Qty: 90 TABLET | Refills: 1 | Status: SHIPPED | OUTPATIENT
Start: 2019-05-07 | End: 2019-12-16 | Stop reason: SDUPTHER

## 2019-05-08 ENCOUNTER — HOSPITAL ENCOUNTER (OUTPATIENT)
Dept: NON INVASIVE DIAGNOSTICS | Age: 84
Discharge: HOME | End: 2019-05-08
Payer: MEDICARE

## 2019-05-08 PROCEDURE — A9500 TC99M SESTAMIBI: HCPCS | Performed by: INTERNAL MEDICINE

## 2019-05-08 PROCEDURE — 3430000000 HC RX DIAGNOSTIC RADIOPHARMACEUTICAL: Performed by: INTERNAL MEDICINE

## 2019-05-08 PROCEDURE — 78452 HT MUSCLE IMAGE SPECT MULT: CPT | Performed by: INTERNAL MEDICINE

## 2019-05-08 PROCEDURE — 93017 CV STRESS TEST TRACING ONLY: CPT | Performed by: INTERNAL MEDICINE

## 2019-05-08 PROCEDURE — 2709999900 HC NON-CHARGEABLE SUPPLY

## 2019-05-08 RX ADMIN — Medication 9.5 MILLICURIE: at 11:45

## 2019-05-08 RX ADMIN — Medication 31.6 MILLICURIE: at 13:00

## 2019-05-09 ENCOUNTER — TELEPHONE (OUTPATIENT)
Dept: INTERNAL MEDICINE CLINIC | Age: 84
End: 2019-05-09

## 2019-05-09 NOTE — TELEPHONE ENCOUNTER
----- Message from Steve Aguirre MD sent at 5/9/2019  9:08 AM EDT -----  Tell him looks ok; ok for surgery

## 2019-05-09 NOTE — TELEPHONE ENCOUNTER
Notified pts wife that Stress test came back OK and is OK for surgery. We will let Dr. Cristi Dia office know. She verbalizes understanding.

## 2019-05-16 ENCOUNTER — TELEPHONE (OUTPATIENT)
Dept: INTERNAL MEDICINE CLINIC | Age: 84
End: 2019-05-16

## 2019-05-16 NOTE — TELEPHONE ENCOUNTER
Left message for surinder at 's office asking if she received the clearance note from Dr. Neha Metzger last week. He has cleared him for total knee. Pt is on our scheduled for a preop on 5/28/19. This is not needed from our prospective. Just need to check with her if anything else is needed. Asked her to give me a call back to discuss.

## 2019-05-16 NOTE — TELEPHONE ENCOUNTER
Pt is on our schedule for 5/28/19 for a preop for a left total knee on 6/3/19 at [facility]. Do you need to see him again? I have it noted that his Stress test is OK and he is OK for surgery and we sent a letter to [facility] with clearance. You don't need to see him again do you?

## 2019-05-17 NOTE — TELEPHONE ENCOUNTER
Spoke to Coleen Trent. She says if there are any gross abnormalities on labs that pt will get next week she will let us know. I faxed her EKG that was done in our office on pts office appt. She will let pt know he doesnot need to come to appt that was scheduled on 5/28/19.

## 2019-05-28 ENCOUNTER — OFFICE VISIT (OUTPATIENT)
Dept: FAMILY MEDICINE CLINIC | Age: 84
End: 2019-05-28

## 2019-05-28 VITALS
WEIGHT: 177.5 LBS | SYSTOLIC BLOOD PRESSURE: 118 MMHG | RESPIRATION RATE: 10 BRPM | HEART RATE: 72 BPM | BODY MASS INDEX: 25.41 KG/M2 | DIASTOLIC BLOOD PRESSURE: 64 MMHG | HEIGHT: 70 IN

## 2019-05-28 DIAGNOSIS — I10 ESSENTIAL HYPERTENSION: ICD-10-CM

## 2019-05-28 DIAGNOSIS — M17.0 PRIMARY OSTEOARTHRITIS OF BOTH KNEES: ICD-10-CM

## 2019-05-28 DIAGNOSIS — E11.9 TYPE 2 DIABETES MELLITUS WITHOUT COMPLICATION, WITHOUT LONG-TERM CURRENT USE OF INSULIN (HCC): Primary | ICD-10-CM

## 2019-05-28 DIAGNOSIS — E78.2 MIXED HYPERLIPIDEMIA: ICD-10-CM

## 2019-05-28 DIAGNOSIS — I25.10 CORONARY ARTERY DISEASE INVOLVING NATIVE CORONARY ARTERY OF NATIVE HEART WITHOUT ANGINA PECTORIS: ICD-10-CM

## 2019-05-28 DIAGNOSIS — K63.5 POLYP OF COLON, UNSPECIFIED PART OF COLON, UNSPECIFIED TYPE: ICD-10-CM

## 2019-05-28 DIAGNOSIS — E03.4 HYPOTHYROIDISM DUE TO ACQUIRED ATROPHY OF THYROID: ICD-10-CM

## 2019-05-28 DIAGNOSIS — K59.00 CONSTIPATION, UNSPECIFIED CONSTIPATION TYPE: ICD-10-CM

## 2019-05-28 LAB
CHP ED QC CHECK: ABNORMAL
GLUCOSE BLD-MCNC: 174 MG/DL
HBA1C MFR BLD: 6.8 %

## 2019-05-28 PROCEDURE — G8598 ASA/ANTIPLAT THER USED: HCPCS | Performed by: FAMILY MEDICINE

## 2019-05-28 PROCEDURE — 1123F ACP DISCUSS/DSCN MKR DOCD: CPT | Performed by: FAMILY MEDICINE

## 2019-05-28 PROCEDURE — G8417 CALC BMI ABV UP PARAM F/U: HCPCS | Performed by: FAMILY MEDICINE

## 2019-05-28 PROCEDURE — 99213 OFFICE O/P EST LOW 20 MIN: CPT | Performed by: FAMILY MEDICINE

## 2019-05-28 PROCEDURE — 83036 HEMOGLOBIN GLYCOSYLATED A1C: CPT | Performed by: FAMILY MEDICINE

## 2019-05-28 PROCEDURE — 82962 GLUCOSE BLOOD TEST: CPT | Performed by: FAMILY MEDICINE

## 2019-05-28 PROCEDURE — 1036F TOBACCO NON-USER: CPT | Performed by: FAMILY MEDICINE

## 2019-05-28 PROCEDURE — 4040F PNEUMOC VAC/ADMIN/RCVD: CPT | Performed by: FAMILY MEDICINE

## 2019-05-28 PROCEDURE — G8427 DOCREV CUR MEDS BY ELIG CLIN: HCPCS | Performed by: FAMILY MEDICINE

## 2019-05-28 RX ORDER — TRAMADOL HYDROCHLORIDE 50 MG/1
50 TABLET ORAL EVERY 6 HOURS PRN
Qty: 28 TABLET | Refills: 0 | Status: SHIPPED | OUTPATIENT
Start: 2019-05-28 | End: 2019-06-04

## 2019-05-28 RX ORDER — POLYETHYLENE GLYCOL 3350 17 G/17G
17 POWDER, FOR SOLUTION ORAL DAILY
Qty: 510 G | Refills: 2 | Status: SHIPPED | OUTPATIENT
Start: 2019-05-28 | End: 2020-11-03 | Stop reason: SDUPTHER

## 2019-05-28 ASSESSMENT — ENCOUNTER SYMPTOMS
ABDOMINAL PAIN: 0
TROUBLE SWALLOWING: 0
COUGH: 0
EYE PAIN: 0
SORE THROAT: 0
CHEST TIGHTNESS: 0
CONSTIPATION: 0
BACK PAIN: 0
BLOOD IN STOOL: 0
SHORTNESS OF BREATH: 0
NAUSEA: 0

## 2019-05-28 NOTE — PATIENT INSTRUCTIONS
For  Knee surgery  And  See in July  [facility] and ferrous sulfate 325 mg one  A day

## 2019-05-28 NOTE — PROGRESS NOTES
for cough, chest tightness and shortness of breath. Cardiovascular: Negative for chest pain, palpitations and leg swelling. Gastrointestinal: Negative for abdominal pain, blood in stool, constipation and nausea. Genitourinary: Negative for difficulty urinating, frequency and urgency. Musculoskeletal: Negative for arthralgias, back pain, joint swelling and neck stiffness. Skin: Negative for rash. Neurological: Negative for dizziness, weakness and headaches. Hematological: Negative for adenopathy. Does not bruise/bleed easily. Psychiatric/Behavioral: Negative for behavioral problems, dysphoric mood and sleep disturbance. /64 (Site: Right Upper Arm, Position: Sitting, Cuff Size: Medium Adult)   Pulse 72   Resp 10   Ht 5' 10\" (1.778 m)   Wt 177 lb 8 oz (80.5 kg)   BMI 25.47 kg/m²   Objective:   Physical Exam   Constitutional: He is oriented to person, place, and time. He appears well-developed and well-nourished. HENT:   Head: Normocephalic and atraumatic. Right Ear: External ear normal.   Left Ear: External ear normal.   Nose: Nose normal.   Mouth/Throat: Oropharynx is clear and moist.   Eyes: Pupils are equal, round, and reactive to light. Conjunctivae and EOM are normal.   Fundi nl   Neck: Normal range of motion. Neck supple. No thyromegaly present. Cardiovascular: Normal rate, regular rhythm, normal heart sounds and intact distal pulses. Pulmonary/Chest: Effort normal and breath sounds normal. He has no wheezes. He has no rales. Abdominal: Soft. Bowel sounds are normal. He exhibits no mass. There is no tenderness. Musculoskeletal: Normal range of motion. Lymphadenopathy:     He has no cervical adenopathy. Neurological: He is alert and oriented to person, place, and time. He has normal reflexes. No cranial nerve deficit. Skin: Skin is warm and dry. No rash noted. Psychiatric: He has a normal mood and affect. Nursing note and vitals reviewed.       Assessment: Diagnosis Orders   1. Type 2 diabetes mellitus without complication, without long-term current use of insulin ([facility])  POCT Glucose    POCT glycosylated hemoglobin (Hb A1C)   2. Constipation, unspecified constipation type  polyethylene glycol (MIRALAX) powder   3. Essential hypertension     4. Mixed hyperlipidemia     5. Polyp of colon, unspecified part of colon, unspecified type     6. Coronary artery disease involving native coronary artery of native heart without angina pectoris     7. Hypothyroidism due to acquired atrophy of thyroid     8. Primary osteoarthritis of both knees  traMADol (ULTRAM) 50 MG tablet         Plan:      Current Outpatient Medications   Medication Sig Dispense Refill    polyethylene glycol (MIRALAX) powder Take 17 g by mouth daily 510 g 2    traMADol (ULTRAM) 50 MG tablet Take 1 tablet by mouth every 6 hours as needed for Pain for up to 7 days. Intended supply: 7 days. Take lowest dose possible to manage pain 28 tablet 0    levothyroxine (SYNTHROID) 25 MCG tablet TAKE 1 TABLET DAILY 90 tablet 1    metoprolol tartrate (LOPRESSOR) 25 MG tablet TAKE 1 TABLET TWICE A  tablet 1    metFORMIN (GLUCOPHAGE) 500 MG tablet TAKE 1 TABLET EVERY MORNINGAND TAKE 2 TABLETS EVERY   EVENING 270 tablet 1    glimepiride (AMARYL) 4 MG tablet TAKE 1/2 TABLET TWICE A DAY 90 tablet 1    simvastatin (ZOCOR) 10 MG tablet Take 1 tablet by mouth nightly New lower dose 90 tablet 2    triamcinolone (KENALOG) 0.1 % cream Apply topically 2 times daily.  60 g 1    enalapril (VASOTEC) 2.5 MG tablet Take 1 tablet by mouth every other day New lower dose one daily 90 tablet 1    tamsulosin (FLOMAX) 0.4 MG capsule Take 1 capsule by mouth daily 90 capsule 3    ranitidine (ZANTAC) 150 MG tablet Take 150 mg by mouth daily as needed for Heartburn      glucose blood VI test strips (ACCU-CHEK RUPA) strip Use to check blood sugar daily, Dx: E11.9 300 each 3    nitroGLYCERIN (NITROSTAT) 0.4 MG SL tablet Place 1 tablet under the tongue every 5 minutes as needed 25 tablet 5    Ascorbic Acid (VITAMIN C) 1000 MG tablet Take 1,000 mg by mouth daily.  meloxicam (MOBIC) 7.5 MG tablet TAKE 1 TABLET DAILY 90 tablet 1    aspirin EC 81 MG EC tablet Take 81 mg by mouth daily. No current facility-administered medications for this visit. Orders Placed This Encounter   Procedures    POCT Glucose    POCT glycosylated hemoglobin (Hb A1C)     Results for orders placed or performed in visit on 05/28/19   POCT Glucose   Result Value Ref Range    Glucose 174 (A) mg/dL    QC OK? POCT glycosylated hemoglobin (Hb A1C)   Result Value Ref Range    Hemoglobin A1C 6.8 (A) %     Ravi received counseling on the following healthy behaviors: nutrition and exercise    Patient given educational materials on Diabetes and Hyperlipidemia    I have instructed Felicity Cornejo to complete a self tracking handout on Blood Sugars  and Blood Pressures  and instructed them to bring it with them to his next appointment. Discussed use, benefit, and side effects of prescribed medications. Barriers to medication compliance addressed. All patient questions answered. Pt voiced understanding.           For  Knee surgery  And  See in July  [facility] and ferrous sulfate 325 mg one  A day   Latrelle Merlin, MD

## 2019-06-05 ENCOUNTER — CARE COORDINATION (OUTPATIENT)
Dept: CARE COORDINATION | Age: 84
End: 2019-06-05

## 2019-06-05 NOTE — CARE COORDINATION
Dr. Fransisco Pedroza, I have enrolled Eliud Abreu into Care Coordination to provide support and education as he recovers from his knee replacement surgery. I spoke with wife Kanchan Perkins who confirmed that Eliud Abreu will not be discharged until Thursday morning 6-6-19. She was very overwhelmed as she was informed that Eliud Abreu would be going to SNF for a 1-2 week therapy stay and she wanted to make sure that he could go somewhere where PCP would be able to see him there. I called and spoke with admissions director from [address] [place] [address] and she confirmed that [facility] had been in touch with them and everything was ready for his admission Thursday morning. I made Kanchancory Perkins aware of his information and she was very greatful for the support. I gave Kanchancory Harveyns my cell phone number and will be following up with them after discharge from SNF. Please approve plan of care using smart phrase (. [email or web address]). Thank you!

## 2019-06-05 NOTE — CARE COORDINATION
Ambulatory Care Coordination Note  6/5/2019  CM Risk Score: 1  Carmine Mortality Risk Score: 19    ACC: Jv Pagan, RN    Summary Note: Spoke with wife Elías Patel. Introduced myself and explained Care Coordination program.  Neda Landau was PCP referral for follow up after elective left knee replacement surgery at [facility]. Neda Landau had replacement 6-3-19 and still remains at [facility]. Discharge is scheduled for tomorrow morning. Elías Patel was very overwhelmed because she was informed that Neda Landau will need placed at SNF for therapy for at least a week or 2 of therapy. Elías Patel states she would like him to go to SNF that PCP is medical director of. I informed her that I would call the [address] [place] [address] as that was her first choice for placement and if they did not have bed available, I could call [facility]. Elías Patel was very thankful of support. I called and spoke with admissions director at [address] [place] [address] and the arrangements had already been set up for Neda Landau to be admitted there tomorrow morning. O had set up all arrangements for SNF placement. I called Elías Patel and explained this to her and she was very thankful for the support. I have Elías Patel my contact information and encouraged her to call me with any questions or concerns. I will follow up after discharge from SNF to address any barriers and to provide support and education.   Plan-  -follow up upon discharge from SNF which is scheduled to be very short term  -assess need for [facility] and therapy once discharged from SNF  -address chronic conditions, barriers to meeting goals and education needed  General Assessment    Do you have any symptoms that are causing concern?:  Yes  Reported Symptoms:  Other (Comment: left knee replacement)           Ambulatory Care Coordination Assessment    Care Coordination Protocol  Program Enrollment:  Rising Risk  Referral from Primary Care Provider:  No  Week 1 - Initial Assessment     Do you have all of your prescriptions and are they filled?:  Yes  Barriers to medication adherence:  None  Are you able to afford your medications?:  Yes  How often do you have trouble taking your medications the way you have been told to take them?:  I always take them as prescribed. Do you have Home O2 Therapy?:  No      Is patient able to live independently?:  Yes     Current Housing:  Private Residence        Per the Fall Risk Screening, did the patient have 2 or more falls or 1 fall with injury in the past year?:  No        Are you experiencing loss of meaning?:  No  Are you experiencing loss of hope and peace?:  No     Thinking about your patient's physical health needs, are there any symptoms or problems (risk indicators) you are unsure about that require further investigation?:  No identified areas of uncertainly or problems already being investigated   Are the patients physical health problems impacting on their mental well-being?:  No identified areas of concern   Are there any problems with your patients lifestyle behaviors (alcohol, drugs, diet, exercise) that are impacting on physical or mental well-being?:  No identified areas of concern   Do you have any other concerns about your patients mental well-being?  How would you rate their severity and impact on the patient?:  No identified areas of concern   How would you rate their home environment in terms of safety and stability (including domestic violence, insecure housing, neighbor harassment)?:  Consistently safe, supportive, stable, no identified problems   How do daily activities impact on the patient's well-being? (include current or anticipated unemployment, work, caregiving, access to transportation or other):  No identified problems or perceived positive benefits   How would you rate their social network (family, work, friends)?:  Good participation with social networks   How would you rate their financial resources (including ability to afford all required medical care)?:  Financially secure, resources adequate, no identified problems   How wells does the patient now understand their health and well-being (symptoms, signs or risk factors) and what they need to do to manage their health?:  Reasonable to good understanding and already engages in managing health or is willing to undertake better management   How well do you think your patient can engage in healthcare discussions? (Barriers include language, deafness, aphasia, alcohol or drug problems, learning difficulties, concentration):  Clear and open communication, no identified barriers   Do other services need to be involved to help this patient?:  Other care/services not required at this time   Are current services involved with this patient well-coordinated? (Include coordination with other services you are now recommendation): All required care/services in place and well-coordinated   Suggested Interventions and Community Resources   Zone Management Tools: In Process                  Prior to Admission medications    Medication Sig Start Date End Date Taking? Authorizing Provider   polyethylene glycol (MIRALAX) powder Take 17 g by mouth daily 5/28/19   Brigiod Alanis MD   levothyroxine (SYNTHROID) 25 MCG tablet TAKE 1 TABLET DAILY 5/7/19   Brigido Alanis MD   metoprolol tartrate (LOPRESSOR) 25 MG tablet TAKE 1 TABLET TWICE A DAY 4/23/19   Valentino Jesus, MD   meloxicam (MOBIC) 7.5 MG tablet TAKE 1 TABLET DAILY 1/29/19   Brigido Alanis MD   metFORMIN (GLUCOPHAGE) 500 MG tablet TAKE 1 TABLET EVERY MORNINGAND TAKE 2 TABLETS EVERY   EVENING 1/29/19   Brigido Alanis MD   glimepiride (AMARYL) 4 MG tablet TAKE 1/2 TABLET TWICE A DAY 1/29/19   Brigido Alanis MD   simvastatin (ZOCOR) 10 MG tablet Take 1 tablet by mouth nightly New lower dose 11/19/18   Brigido Alanis MD   triamcinolone (KENALOG) 0.1 % cream Apply topically 2 times daily.  10/25/18   Brigido Alanis MD   enalapril (VASOTEC) 2.5 MG tablet Take 1 tablet by mouth every other day New lower dose one daily 8/16/18   Zheng Moya MD   tamsulosin [facility]) 0.4 MG capsule Take 1 capsule by mouth daily 5/9/18   Zheng Moya MD   ranitidine (ZANTAC) 150 MG tablet Take 150 mg by mouth daily as needed for Heartburn    Historical Provider, MD   glucose blood VI test strips (ACCU-CHEK RUPA) strip Use to check blood sugar daily, Dx: E11.9 3/27/17   Zheng Moya MD   nitroGLYCERIN (NITROSTAT) 0.4 MG SL tablet Place 1 tablet under the tongue every 5 minutes as needed 1/7/16   Sayda Hood MD   Ascorbic Acid (VITAMIN C) 1000 MG tablet Take 1,000 mg by mouth daily. Historical Provider, MD   aspirin EC 81 MG EC tablet Take 81 mg by mouth daily.       Historical Provider, MD       Future Appointments   Date Time Provider Supa Harden   7/17/2019  8:20 AM Zheng Moya MD [facility] [address]   7/23/2019  1:30 PM Ramona Burrell MD [facility] [id number] [facility]

## 2019-06-13 ENCOUNTER — CARE COORDINATION (OUTPATIENT)
Dept: CARE COORDINATION | Age: 84
End: 2019-06-13

## 2019-06-13 NOTE — CARE COORDINATION
Unable to reach wife on cell phone and left message on home phone. I called and spoke with Reid harris of [facility] who states that Jeronimo Amaro is still currently admitted and has a discharge plan in place for this Saturday. Will follow up upon discharge.

## 2019-06-17 ENCOUNTER — CARE COORDINATION (OUTPATIENT)
Dept: CARE COORDINATION | Age: 84
End: 2019-06-17

## 2019-06-17 ENCOUNTER — TELEPHONE (OUTPATIENT)
Dept: FAMILY MEDICINE CLINIC | Age: 84
End: 2019-06-17

## 2019-06-17 NOTE — CARE COORDINATION
Spoke with wife and states she was not able to talk right now because she was currently working with [facility] services that were in the home to do the assessments on Don. She asked if I could call her back at another time.

## 2019-06-18 RX ORDER — TRAMADOL HYDROCHLORIDE 50 MG/1
50 TABLET ORAL EVERY 6 HOURS PRN
COMMUNITY
End: 2020-01-07 | Stop reason: ALTCHOICE

## 2019-06-24 ENCOUNTER — OFFICE VISIT (OUTPATIENT)
Dept: FAMILY MEDICINE CLINIC | Age: 84
End: 2019-06-24

## 2019-06-24 VITALS
HEART RATE: 72 BPM | SYSTOLIC BLOOD PRESSURE: 112 MMHG | WEIGHT: 173.38 LBS | DIASTOLIC BLOOD PRESSURE: 64 MMHG | BODY MASS INDEX: 24.82 KG/M2 | HEIGHT: 70 IN | RESPIRATION RATE: 14 BRPM

## 2019-06-24 DIAGNOSIS — E11.9 TYPE 2 DIABETES MELLITUS WITHOUT COMPLICATION, WITHOUT LONG-TERM CURRENT USE OF INSULIN (HCC): ICD-10-CM

## 2019-06-24 DIAGNOSIS — M17.12 PRIMARY LOCALIZED OSTEOARTHRITIS OF LEFT KNEE: ICD-10-CM

## 2019-06-24 DIAGNOSIS — I10 ESSENTIAL HYPERTENSION: Primary | ICD-10-CM

## 2019-06-24 DIAGNOSIS — I25.10 CORONARY ARTERY DISEASE INVOLVING NATIVE CORONARY ARTERY OF NATIVE HEART WITHOUT ANGINA PECTORIS: ICD-10-CM

## 2019-06-24 DIAGNOSIS — E03.4 HYPOTHYROIDISM DUE TO ACQUIRED ATROPHY OF THYROID: ICD-10-CM

## 2019-06-24 DIAGNOSIS — D62 ACUTE BLOOD LOSS ANEMIA: ICD-10-CM

## 2019-06-24 DIAGNOSIS — E78.2 MIXED HYPERLIPIDEMIA: ICD-10-CM

## 2019-06-24 LAB — HGB, POC: 10.4

## 2019-06-24 PROCEDURE — G8598 ASA/ANTIPLAT THER USED: HCPCS | Performed by: FAMILY MEDICINE

## 2019-06-24 PROCEDURE — 1111F DSCHRG MED/CURRENT MED MERGE: CPT | Performed by: FAMILY MEDICINE

## 2019-06-24 PROCEDURE — 1123F ACP DISCUSS/DSCN MKR DOCD: CPT | Performed by: FAMILY MEDICINE

## 2019-06-24 PROCEDURE — G8420 CALC BMI NORM PARAMETERS: HCPCS | Performed by: FAMILY MEDICINE

## 2019-06-24 PROCEDURE — 1036F TOBACCO NON-USER: CPT | Performed by: FAMILY MEDICINE

## 2019-06-24 PROCEDURE — 4040F PNEUMOC VAC/ADMIN/RCVD: CPT | Performed by: FAMILY MEDICINE

## 2019-06-24 PROCEDURE — 85018 HEMOGLOBIN: CPT | Performed by: FAMILY MEDICINE

## 2019-06-24 PROCEDURE — 99214 OFFICE O/P EST MOD 30 MIN: CPT | Performed by: FAMILY MEDICINE

## 2019-06-24 PROCEDURE — G8427 DOCREV CUR MEDS BY ELIG CLIN: HCPCS | Performed by: FAMILY MEDICINE

## 2019-06-24 ASSESSMENT — ENCOUNTER SYMPTOMS
NAUSEA: 0
BLOOD IN STOOL: 0
SHORTNESS OF BREATH: 0
SORE THROAT: 0
CONSTIPATION: 0
EYE PAIN: 0
ABDOMINAL PAIN: 0
CHEST TIGHTNESS: 0
COUGH: 0
BACK PAIN: 0
TROUBLE SWALLOWING: 0

## 2019-06-24 NOTE — PROGRESS NOTES
Post-Discharge Transitional Care Management Services or Hospital Follow Up      Tony Harris   YOB: 1935    Date of Office Visit:  6/24/2019  Date of Hospital Admission 6-3-19  Date of Hospital Discharge 6-6-19 and at  [facility] and   Home  From the  [facility]  6-15-19  Readmission Risk Score(high >=14%. Medium >=10%):No data recorded    Care management risk score Rising risk (score 2-5) and Complex Care (Scores >=6): 1     Non face to face  following discharge, date last encounter closed (first attempt may have been earlier): 6/18/2019  8:54 AM 6/18/2019  8:54 AM    Call initiated 2 business days of discharge: Yes     Patient Active Problem List   Diagnosis    Hypertension    Hyperlipidemia    CAD (coronary artery disease)    Type 2 diabetes mellitus without complication ([address]    Colon polyp    Low testosterone    Hypothyroidism due to acquired atrophy of thyroid         No Known Allergies    Medications listed as ordered at the time of discharge from hospital   Shara MORSE   Home Medication Instructions [place]:    Printed on:06/24/19 2138   Medication Information                      Ascorbic Acid (VITAMIN C) 1000 MG tablet  Take 1,000 mg by mouth daily. aspirin EC 81 MG EC tablet  Take 81 mg by mouth daily.                enalapril (VASOTEC) 2.5 MG tablet  Take 1 tablet by mouth every other day New lower dose one daily             glimepiride (AMARYL) 4 MG tablet  TAKE 1/2 TABLET TWICE A DAY             glucose blood VI test strips (ACCU-CHEK RUPA) strip  Use to check blood sugar daily, Dx: E11.9             levothyroxine (SYNTHROID) 25 MCG tablet  TAKE 1 TABLET DAILY             meloxicam (MOBIC) 7.5 MG tablet  TAKE 1 TABLET DAILY             metFORMIN (GLUCOPHAGE) 500 MG tablet  TAKE 1 TABLET EVERY MORNINGAND TAKE 2 TABLETS EVERY   EVENING             metoprolol tartrate (LOPRESSOR) 25 MG tablet  TAKE 1 TABLET TWICE A DAY             nitroGLYCERIN (NITROSTAT) 0.4 MG SL tablet  Place 1 tablet under the tongue every 5 minutes as needed             polyethylene glycol (MIRALAX) powder  Take 17 g by mouth daily             ranitidine (ZANTAC) 150 MG tablet  Take 150 mg by mouth daily as needed for Heartburn             simvastatin (ZOCOR) 10 MG tablet  Take 1 tablet by mouth nightly New lower dose             tamsulosin (FLOMAX) 0.4 MG capsule  Take 1 capsule by mouth daily             traMADol (ULTRAM) 50 MG tablet  Take 50 mg by mouth every 6 hours as needed for Pain.             triamcinolone (KENALOG) 0.1 % cream  Apply topically 2 times daily. Medications marked \"taking\" at this time  Outpatient Medications Marked as Taking for the 6/24/19 encounter (Office Visit) with Perri Falcon MD   Medication Sig Dispense Refill    traMADol (ULTRAM) 50 MG tablet Take 50 mg by mouth every 6 hours as needed for Pain.  polyethylene glycol (MIRALAX) powder Take 17 g by mouth daily 510 g 2    levothyroxine (SYNTHROID) 25 MCG tablet TAKE 1 TABLET DAILY 90 tablet 1    metoprolol tartrate (LOPRESSOR) 25 MG tablet TAKE 1 TABLET TWICE A  tablet 1    meloxicam (MOBIC) 7.5 MG tablet TAKE 1 TABLET DAILY 90 tablet 1    metFORMIN (GLUCOPHAGE) 500 MG tablet TAKE 1 TABLET EVERY MORNINGAND TAKE 2 TABLETS EVERY   EVENING 270 tablet 1    glimepiride (AMARYL) 4 MG tablet TAKE 1/2 TABLET TWICE A DAY 90 tablet 1    simvastatin (ZOCOR) 10 MG tablet Take 1 tablet by mouth nightly New lower dose 90 tablet 2    triamcinolone (KENALOG) 0.1 % cream Apply topically 2 times daily.  60 g 1    enalapril (VASOTEC) 2.5 MG tablet Take 1 tablet by mouth every other day New lower dose one daily 90 tablet 1    tamsulosin (FLOMAX) 0.4 MG capsule Take 1 capsule by mouth daily 90 capsule 3    ranitidine (ZANTAC) 150 MG tablet Take 150 mg by mouth daily as needed for Heartburn      glucose blood VI test strips (ACCU-CHEK RUPA) strip Use to check blood sugar daily, Dx: E11.9 300 each 3  nitroGLYCERIN (NITROSTAT) 0.4 MG SL tablet Place 1 tablet under the tongue every 5 minutes as needed 25 tablet 5    Ascorbic Acid (VITAMIN C) 1000 MG tablet Take 1,000 mg by mouth daily.  aspirin EC 81 MG EC tablet Take 81 mg by mouth daily. Medications patient taking as of now reconciled against medications ordered at time of hospital discharge: Yes    Chief Complaint   Patient presents with    Follow Up After Procedure     Left Total Knee on 6-3-19, Dr Evans Bai @ [facility]          Total left  Knee  Doing  Well but  Not  Sleeping         ashd  Stable     insomnia   Noted       Inpatient course: Discharge summary reviewed- see chart. Interval history/Current status: post left total knee as surgery was on Myrna 3 period was at the nursing home and from June 6 through Myrna 15.  Biggest issue right now is just not sleeping      anemia  Noted   Past Medical History:   Diagnosis Date    CAD (coronary artery disease)     Colon polyp     Hyperlipidemia     Hypertension     S/P CABG (coronary artery bypass graft)     Type II or unspecified type diabetes mellitus without mention of complication, not stated as uncontrolled      Past Surgical History:   Procedure Laterality Date    CARPAL TUNNEL RELEASE  1995 and 1995    left and  right     COLONOSCOPY  2009    dr. Priyanka Gutierrez and due 2014    CORONARY ARTERY BYPASS GRAFT  2005    HERNIA REPAIR      TOTAL KNEE ARTHROPLASTY Left 06/03/2019    Dr Evans Bai @ [facility]      Social History     Socioeconomic History    Marital status:      Spouse name: Not on file    Number of children: Not on file    Years of education: Not on file    Highest education level: Not on file   Occupational History    Not on file   Social Needs    Financial resource strain: Not on file    Food insecurity:     Worry: Not on file     Inability: Not on file    Transportation needs:     Medical: Not on file     Non-medical: Not on file   Tobacco Use    Smoking status: Never Smoker    Smokeless tobacco: Never Used   Substance and Sexual Activity    Alcohol use: Yes     Alcohol/week: 0.0 oz     Comment: very rare    Drug use: No    Sexual activity: Not on file   Lifestyle    Physical activity:     Days per week: Not on file     Minutes per session: Not on file    Stress: Not on file   Relationships    Social connections:     Talks on phone: Not on file     Gets together: Not on file     Attends Hindu service: Not on file     Active member of club or organization: Not on file     Attends meetings of clubs or organizations: Not on file     Relationship status: Not on file    Intimate partner violence:     Fear of current or ex partner: Not on file     Emotionally abused: Not on file     Physically abused: Not on file     Forced sexual activity: Not on file   Other Topics Concern    Not on file   Social History Narrative    Not on file     Family History   Problem Relation Age of Onset    Heart Disease Father     Heart Disease Mother        Review of Systems   Constitutional: Negative for fatigue and fever. HENT: Negative for congestion, ear pain, postnasal drip, sore throat and trouble swallowing. Eyes: Negative for pain. Respiratory: Negative for cough, chest tightness and shortness of breath. Cardiovascular: Negative for chest pain, palpitations and leg swelling. Gastrointestinal: Negative for abdominal pain, blood in stool, constipation and nausea. Genitourinary: Negative for difficulty urinating, frequency and urgency. Musculoskeletal: Negative for arthralgias, back pain, joint swelling and neck stiffness. Left knee  To  90 degrees    Skin: Negative for rash. Neurological: Negative for dizziness, weakness and headaches. Hematological: Negative for adenopathy. Does not bruise/bleed easily. Psychiatric/Behavioral: Negative for behavioral problems, dysphoric mood and sleep disturbance.        Vitals:    06/24/19 1036   BP: 112/64   Site: Right

## 2019-07-03 ENCOUNTER — CARE COORDINATION (OUTPATIENT)
Dept: CARE COORDINATION | Age: 84
End: 2019-07-03

## 2019-07-03 NOTE — CARE COORDINATION
patient-reported symptoms                Care Coordination Interventions    Program Enrollment:  Rising Risk  Referral from Primary Care Provider:  No  Suggested Interventions and Community Resources  Zone Management Tools: In Process         Goals Addressed                 This Visit's Progress     Conditions and Symptoms   On track     I will schedule office visits, as directed by my provider. I will keep my appointment or reschedule if I have to cancel. I will notify my provider of any barriers to my plan of care. I will follow my Zone Management tool to seek urgent or emergent care. I will notify my provider of any symptoms that indicate a worsening of my condition. Diabetes, Left TKR  Barriers: impairment:  physical: left knee replacement  Plan for overcoming my barriers: family, SNF and ACC support  Confidence: 8/10  Anticipated Goal Completion Date: 9-5-19    Update: Alex Bello has been discharged from SNF and has returned home. HH in place. PCP appt in place. 6-18-19  Update: Progressing with therapy. Still working with therapy, pain is being managed. Following up with [facility]. 7-3-19         Self Monitoring   On track     Self-Monitored Blood Glucose - I will notify my provider if I have any blood sugar readings less than 70 more than 2 times a month. Diabetes  None Recently Recorded    Barriers: overwhelmed by complexity of regimen  Plan for overcoming my barriers: HH, family and ACM support  Confidence: 8/10  Anticipated Goal Completion Date: 9-18-19    Update: Checks blood sugars daily. States they are running WNL and A1C is at goal.  Using diet and medication to help manage. 7-3-19              Prior to Admission medications    Medication Sig Start Date End Date Taking? Authorizing Provider   traMADol (ULTRAM) 50 MG tablet Take 50 mg by mouth every 6 hours as needed for Pain.     Historical Provider, MD   polyethylene glycol (MIRALAX) powder Take 17 g by mouth daily 5/28/19   Doralee Romberg, MD

## 2019-07-17 ENCOUNTER — OFFICE VISIT (OUTPATIENT)
Dept: FAMILY MEDICINE CLINIC | Age: 84
End: 2019-07-17

## 2019-07-17 VITALS
HEART RATE: 80 BPM | WEIGHT: 179.25 LBS | DIASTOLIC BLOOD PRESSURE: 60 MMHG | HEIGHT: 70 IN | SYSTOLIC BLOOD PRESSURE: 128 MMHG | BODY MASS INDEX: 25.66 KG/M2 | RESPIRATION RATE: 14 BRPM

## 2019-07-17 DIAGNOSIS — E78.2 MIXED HYPERLIPIDEMIA: ICD-10-CM

## 2019-07-17 DIAGNOSIS — I10 ESSENTIAL HYPERTENSION: Primary | ICD-10-CM

## 2019-07-17 DIAGNOSIS — E03.4 HYPOTHYROIDISM DUE TO ACQUIRED ATROPHY OF THYROID: ICD-10-CM

## 2019-07-17 DIAGNOSIS — R79.89 LOW TESTOSTERONE: ICD-10-CM

## 2019-07-17 DIAGNOSIS — D62 ACUTE BLOOD LOSS ANEMIA: ICD-10-CM

## 2019-07-17 DIAGNOSIS — I25.10 CORONARY ARTERY DISEASE INVOLVING NATIVE CORONARY ARTERY OF NATIVE HEART WITHOUT ANGINA PECTORIS: ICD-10-CM

## 2019-07-17 DIAGNOSIS — E11.9 TYPE 2 DIABETES MELLITUS WITHOUT COMPLICATION, WITHOUT LONG-TERM CURRENT USE OF INSULIN (HCC): ICD-10-CM

## 2019-07-17 LAB — HGB, POC: 10.4

## 2019-07-17 PROCEDURE — 99213 OFFICE O/P EST LOW 20 MIN: CPT | Performed by: FAMILY MEDICINE

## 2019-07-17 PROCEDURE — G8598 ASA/ANTIPLAT THER USED: HCPCS | Performed by: FAMILY MEDICINE

## 2019-07-17 PROCEDURE — 4040F PNEUMOC VAC/ADMIN/RCVD: CPT | Performed by: FAMILY MEDICINE

## 2019-07-17 PROCEDURE — 1036F TOBACCO NON-USER: CPT | Performed by: FAMILY MEDICINE

## 2019-07-17 PROCEDURE — G8417 CALC BMI ABV UP PARAM F/U: HCPCS | Performed by: FAMILY MEDICINE

## 2019-07-17 PROCEDURE — 1123F ACP DISCUSS/DSCN MKR DOCD: CPT | Performed by: FAMILY MEDICINE

## 2019-07-17 PROCEDURE — G8427 DOCREV CUR MEDS BY ELIG CLIN: HCPCS | Performed by: FAMILY MEDICINE

## 2019-07-17 PROCEDURE — 85018 HEMOGLOBIN: CPT | Performed by: FAMILY MEDICINE

## 2019-07-17 PROCEDURE — 96372 THER/PROPH/DIAG INJ SC/IM: CPT | Performed by: FAMILY MEDICINE

## 2019-07-17 RX ORDER — TESTOSTERONE CYPIONATE 200 MG/ML
200 INJECTION INTRAMUSCULAR ONCE
Status: COMPLETED | OUTPATIENT
Start: 2019-07-17 | End: 2019-07-17

## 2019-07-17 RX ADMIN — TESTOSTERONE CYPIONATE 200 MG: 200 INJECTION INTRAMUSCULAR at 09:41

## 2019-07-17 ASSESSMENT — ENCOUNTER SYMPTOMS
COUGH: 0
SHORTNESS OF BREATH: 0
ABDOMINAL PAIN: 0
CHEST TIGHTNESS: 0
TROUBLE SWALLOWING: 0
SORE THROAT: 0
CONSTIPATION: 0
NAUSEA: 0
BLOOD IN STOOL: 0
EYE PAIN: 0
BACK PAIN: 0

## 2019-07-17 NOTE — PROGRESS NOTES
Subjective:      Patient ID: Liat Siegel is a 81 y.o. male. ashd  Stable       Left  Total  Knee  stable         Back pain noted               Diabetes   He presents for his follow-up diabetic visit. He has type 2 diabetes mellitus. His disease course has been stable. There are no hypoglycemic associated symptoms. Pertinent negatives for hypoglycemia include no dizziness or headaches. Pertinent negatives for diabetes include no chest pain, no fatigue and no weakness. There are no hypoglycemic complications. Symptoms are stable. There are no diabetic complications. There are no known risk factors for coronary artery disease. Current diabetic treatment includes oral agent (dual therapy). He is compliant with treatment all of the time. He is following a diabetic diet. His breakfast blood glucose is taken between 8-9 am. His breakfast blood glucose range is generally 110-130 mg/dl. An ACE inhibitor/angiotensin II receptor blocker is being taken. Hypertension   This is a chronic problem. The current episode started more than 1 year ago. The problem has been resolved since onset. The problem is controlled. Pertinent negatives include no chest pain, headaches, palpitations or shortness of breath. The current treatment provides significant improvement. There are no compliance problems. Past Medical History:   Diagnosis Date    CAD (coronary artery disease)     Colon polyp     Hyperlipidemia     Hypertension     S/P CABG (coronary artery bypass graft)     Type II or unspecified type diabetes mellitus without mention of complication, not stated as uncontrolled        Review of Systems   Constitutional: Negative for fatigue and fever. HENT: Negative for congestion, ear pain, postnasal drip, sore throat and trouble swallowing. Eyes: Negative for pain. Respiratory: Negative for cough, chest tightness and shortness of breath. Cardiovascular: Negative for chest pain, palpitations and leg swelling.

## 2019-07-23 ENCOUNTER — OFFICE VISIT (OUTPATIENT)
Dept: INTERNAL MEDICINE CLINIC | Age: 84
End: 2019-07-23
Payer: MEDICARE

## 2019-07-23 VITALS
DIASTOLIC BLOOD PRESSURE: 64 MMHG | HEIGHT: 70 IN | SYSTOLIC BLOOD PRESSURE: 112 MMHG | WEIGHT: 182.5 LBS | HEART RATE: 70 BPM | BODY MASS INDEX: 26.13 KG/M2

## 2019-07-23 DIAGNOSIS — I25.10 CORONARY ARTERY DISEASE INVOLVING NATIVE CORONARY ARTERY OF NATIVE HEART WITHOUT ANGINA PECTORIS: Primary | ICD-10-CM

## 2019-07-23 DIAGNOSIS — E78.2 MIXED HYPERLIPIDEMIA: ICD-10-CM

## 2019-07-23 DIAGNOSIS — E11.9 TYPE 2 DIABETES MELLITUS WITHOUT COMPLICATION, WITHOUT LONG-TERM CURRENT USE OF INSULIN (HCC): ICD-10-CM

## 2019-07-23 PROCEDURE — 1123F ACP DISCUSS/DSCN MKR DOCD: CPT | Performed by: INTERNAL MEDICINE

## 2019-07-23 PROCEDURE — G8417 CALC BMI ABV UP PARAM F/U: HCPCS | Performed by: INTERNAL MEDICINE

## 2019-07-23 PROCEDURE — G8427 DOCREV CUR MEDS BY ELIG CLIN: HCPCS | Performed by: INTERNAL MEDICINE

## 2019-07-23 PROCEDURE — 4040F PNEUMOC VAC/ADMIN/RCVD: CPT | Performed by: INTERNAL MEDICINE

## 2019-07-23 PROCEDURE — G8598 ASA/ANTIPLAT THER USED: HCPCS | Performed by: INTERNAL MEDICINE

## 2019-07-23 PROCEDURE — 99213 OFFICE O/P EST LOW 20 MIN: CPT | Performed by: INTERNAL MEDICINE

## 2019-07-23 PROCEDURE — 1036F TOBACCO NON-USER: CPT | Performed by: INTERNAL MEDICINE

## 2019-07-23 NOTE — PROGRESS NOTES
noted          Diagnosis Orders   1. Coronary artery disease involving native coronary artery of native heart without angina pectoris     2. Mixed hyperlipidemia     3. Type 2 diabetes mellitus without complication, without long-term current use of insulin (HCC)         No orders of the defined types were placed in this encounter. IMP: 1. Cad, s/p cabg, stable  2. Dm controlled     PLAN:  No change meds; discussed mobic, it appears he can't do without it. He takes it with food    Advised if he pursues TKR would consider stress testing    Will schedule follow up appointment in 6m. Continue medications at current doses. Signed By:  Hong Reid M.D.

## 2019-07-29 ENCOUNTER — CARE COORDINATION (OUTPATIENT)
Dept: CARE COORDINATION | Age: 84
End: 2019-07-29

## 2019-08-26 ENCOUNTER — CARE COORDINATION (OUTPATIENT)
Dept: CARE COORDINATION | Age: 84
End: 2019-08-26

## 2019-08-26 NOTE — CARE COORDINATION
Ambulatory Care Coordination Note  8/26/2019  CM Risk Score: 1  Charlson 10 Year Mortality Risk Score: 79%     ACC: Reynaldo Pinon RN    Summary Note: Spoke with wife Cristy Dash. States Aniceto Paul is getting along well from knee replacement surgery and has appt scheduled this week Thursday with [facility] to see if they are going to proceed with other knee to be replaced. She states if this is the plan, additional support may be needed including possible SNF placement for a few weeks of therapy as before. Informed her that I would call back next week to see if surgery was recommended and will assist with setting up plans and support as needed. States blood sugars are running WNL. Declined barriers or new needs at this time  Plan-  -follow up next week after [facility] appt  -assist with planning and support needed if surgery is going to be scheduled  Diabetes Assessment    Medic Alert ID:  No  Meal Planning:  Avoidance of concentrated sweets   How often do you test your blood sugar?:  Daily   Do you have barriers with adherence to non-pharmacologic self-management interventions? (Nutrition/Exercise/Self-Monitoring):  No   Have you ever had to go to the ED for symptoms of low blood sugar?:  No       No patient-reported symptoms                Care Coordination Interventions    Program Enrollment:  Rising Risk  Referral from Primary Care Provider:  No  Suggested Interventions and Community Resources  Occupational Therapy:  Completed  Physical Therapy:  Completed  Zone Management Tools:  Completed         Goals Addressed                 This Visit's Progress     Conditions and Symptoms   On track     I will schedule office visits, as directed by my provider. I will keep my appointment or reschedule if I have to cancel. I will notify my provider of any barriers to my plan of care. I will follow my Zone Management tool to seek urgent or emergent care.   I will notify my provider of any symptoms that indicate a worsening of my

## 2019-09-04 ENCOUNTER — CARE COORDINATION (OUTPATIENT)
Dept: CARE COORDINATION | Age: 84
End: 2019-09-04

## 2019-09-11 ENCOUNTER — CARE COORDINATION (OUTPATIENT)
Dept: CARE COORDINATION | Age: 84
End: 2019-09-11

## 2019-09-12 ENCOUNTER — TELEPHONE (OUTPATIENT)
Dept: FAMILY MEDICINE CLINIC | Age: 84
End: 2019-09-12

## 2019-09-12 DIAGNOSIS — E78.00 PURE HYPERCHOLESTEROLEMIA: ICD-10-CM

## 2019-09-12 DIAGNOSIS — E11.8 TYPE 2 DIABETES MELLITUS WITH COMPLICATION, WITHOUT LONG-TERM CURRENT USE OF INSULIN (HCC): ICD-10-CM

## 2019-09-12 DIAGNOSIS — N40.1 BENIGN NON-NODULAR PROSTATIC HYPERPLASIA WITH LOWER URINARY TRACT SYMPTOMS: ICD-10-CM

## 2019-09-12 DIAGNOSIS — M17.0 PRIMARY OSTEOARTHRITIS OF BOTH KNEES: ICD-10-CM

## 2019-09-16 RX ORDER — TAMSULOSIN HYDROCHLORIDE 0.4 MG/1
0.4 CAPSULE ORAL DAILY
Qty: 90 CAPSULE | Refills: 3 | Status: SHIPPED | OUTPATIENT
Start: 2019-09-16 | End: 2020-09-11

## 2019-09-16 RX ORDER — SIMVASTATIN 10 MG
10 TABLET ORAL NIGHTLY
Qty: 90 TABLET | Refills: 1 | Status: SHIPPED | OUTPATIENT
Start: 2019-09-16 | End: 2020-01-07 | Stop reason: SDUPTHER

## 2019-09-16 RX ORDER — MELOXICAM 7.5 MG/1
TABLET ORAL
Qty: 90 TABLET | Refills: 1 | Status: SHIPPED | OUTPATIENT
Start: 2019-09-16 | End: 2020-01-07 | Stop reason: ALTCHOICE

## 2019-09-17 ENCOUNTER — NURSE ONLY (OUTPATIENT)
Dept: FAMILY MEDICINE CLINIC | Age: 84
End: 2019-09-17

## 2019-09-17 DIAGNOSIS — E11.8 TYPE 2 DIABETES MELLITUS WITH COMPLICATION, WITHOUT LONG-TERM CURRENT USE OF INSULIN (HCC): ICD-10-CM

## 2019-09-17 DIAGNOSIS — R79.89 LOW TESTOSTERONE: Primary | ICD-10-CM

## 2019-09-17 PROCEDURE — 96372 THER/PROPH/DIAG INJ SC/IM: CPT | Performed by: FAMILY MEDICINE

## 2019-09-17 RX ORDER — TESTOSTERONE CYPIONATE 200 MG/ML
200 INJECTION INTRAMUSCULAR ONCE
Status: COMPLETED | OUTPATIENT
Start: 2019-09-17 | End: 2019-09-17

## 2019-09-17 RX ADMIN — TESTOSTERONE CYPIONATE 200 MG: 200 INJECTION INTRAMUSCULAR at 16:12

## 2019-09-17 NOTE — PROGRESS NOTES
After obtaining consent, and per orders of Dr. Mandy Garcia, injection of Testosterone 200 mg given in Right upper quad. gluteus by Elicia Roberto. Patient instructed to remain in clinic for 20 minutes afterwards, and to report any adverse reaction to me immediately.

## 2019-09-19 ENCOUNTER — CARE COORDINATION (OUTPATIENT)
Dept: CARE COORDINATION | Age: 84
End: 2019-09-19

## 2019-09-19 NOTE — CARE COORDINATION
Dr. Fredrica Canavan, I have been working with Dewey Concepcion on Care Coordination program to provide support and education to help manage chronic conditions. He has met those goals and all education has been completed with no new barriers noted. I would like to graduate him from Care Coordination at this time pending PCP approval.  Do you approve of this discharge? Please advise. Thank you.

## 2019-10-17 ENCOUNTER — OFFICE VISIT (OUTPATIENT)
Dept: FAMILY MEDICINE CLINIC | Age: 84
End: 2019-10-17

## 2019-10-17 VITALS
RESPIRATION RATE: 10 BRPM | WEIGHT: 179 LBS | HEART RATE: 76 BPM | HEIGHT: 70 IN | DIASTOLIC BLOOD PRESSURE: 64 MMHG | SYSTOLIC BLOOD PRESSURE: 122 MMHG | BODY MASS INDEX: 25.62 KG/M2

## 2019-10-17 DIAGNOSIS — M54.50 CHRONIC MIDLINE LOW BACK PAIN WITHOUT SCIATICA: ICD-10-CM

## 2019-10-17 DIAGNOSIS — Z23 INFLUENZA VACCINE NEEDED: ICD-10-CM

## 2019-10-17 DIAGNOSIS — I25.10 CORONARY ARTERY DISEASE INVOLVING NATIVE CORONARY ARTERY OF NATIVE HEART WITHOUT ANGINA PECTORIS: ICD-10-CM

## 2019-10-17 DIAGNOSIS — L57.0 ACTINIC KERATOSIS OF RIGHT SIDE OF FOREHEAD: ICD-10-CM

## 2019-10-17 DIAGNOSIS — I10 ESSENTIAL HYPERTENSION: ICD-10-CM

## 2019-10-17 DIAGNOSIS — G89.29 CHRONIC MIDLINE LOW BACK PAIN WITHOUT SCIATICA: ICD-10-CM

## 2019-10-17 DIAGNOSIS — R79.89 LOW TESTOSTERONE: ICD-10-CM

## 2019-10-17 DIAGNOSIS — E11.9 TYPE 2 DIABETES MELLITUS WITHOUT COMPLICATION, WITHOUT LONG-TERM CURRENT USE OF INSULIN (HCC): Primary | ICD-10-CM

## 2019-10-17 DIAGNOSIS — E78.2 MIXED HYPERLIPIDEMIA: ICD-10-CM

## 2019-10-17 DIAGNOSIS — E03.4 HYPOTHYROIDISM DUE TO ACQUIRED ATROPHY OF THYROID: ICD-10-CM

## 2019-10-17 LAB
CHP ED QC CHECK: ABNORMAL
GLUCOSE BLD-MCNC: 151 MG/DL
HBA1C MFR BLD: 6.7 %

## 2019-10-17 PROCEDURE — G0008 ADMIN INFLUENZA VIRUS VAC: HCPCS | Performed by: FAMILY MEDICINE

## 2019-10-17 PROCEDURE — 1123F ACP DISCUSS/DSCN MKR DOCD: CPT | Performed by: FAMILY MEDICINE

## 2019-10-17 PROCEDURE — 82962 GLUCOSE BLOOD TEST: CPT | Performed by: FAMILY MEDICINE

## 2019-10-17 PROCEDURE — 1036F TOBACCO NON-USER: CPT | Performed by: FAMILY MEDICINE

## 2019-10-17 PROCEDURE — 17000 DESTRUCT PREMALG LESION: CPT | Performed by: FAMILY MEDICINE

## 2019-10-17 PROCEDURE — 99213 OFFICE O/P EST LOW 20 MIN: CPT | Performed by: FAMILY MEDICINE

## 2019-10-17 PROCEDURE — 90688 IIV4 VACCINE SPLT 0.5 ML IM: CPT | Performed by: FAMILY MEDICINE

## 2019-10-17 PROCEDURE — G8427 DOCREV CUR MEDS BY ELIG CLIN: HCPCS | Performed by: FAMILY MEDICINE

## 2019-10-17 PROCEDURE — G8417 CALC BMI ABV UP PARAM F/U: HCPCS | Performed by: FAMILY MEDICINE

## 2019-10-17 PROCEDURE — 96372 THER/PROPH/DIAG INJ SC/IM: CPT | Performed by: FAMILY MEDICINE

## 2019-10-17 PROCEDURE — 83036 HEMOGLOBIN GLYCOSYLATED A1C: CPT | Performed by: FAMILY MEDICINE

## 2019-10-17 PROCEDURE — G8598 ASA/ANTIPLAT THER USED: HCPCS | Performed by: FAMILY MEDICINE

## 2019-10-17 PROCEDURE — 4040F PNEUMOC VAC/ADMIN/RCVD: CPT | Performed by: FAMILY MEDICINE

## 2019-10-17 RX ORDER — TESTOSTERONE CYPIONATE 200 MG/ML
200 INJECTION INTRAMUSCULAR ONCE
Status: COMPLETED | OUTPATIENT
Start: 2019-10-17 | End: 2019-10-17

## 2019-10-17 RX ADMIN — TESTOSTERONE CYPIONATE 200 MG: 200 INJECTION INTRAMUSCULAR at 09:39

## 2019-10-17 ASSESSMENT — ENCOUNTER SYMPTOMS
EYE PAIN: 0
ABDOMINAL PAIN: 0
TROUBLE SWALLOWING: 0
NAUSEA: 0
SORE THROAT: 0
CHEST TIGHTNESS: 0
BACK PAIN: 0
SHORTNESS OF BREATH: 0
COUGH: 0
BLOOD IN STOOL: 0
CONSTIPATION: 0

## 2019-10-21 DIAGNOSIS — R79.89 LOW TESTOSTERONE: ICD-10-CM

## 2019-10-23 ENCOUNTER — TELEPHONE (OUTPATIENT)
Dept: FAMILY MEDICINE CLINIC | Age: 84
End: 2019-10-23

## 2019-10-23 DIAGNOSIS — R79.89 LOW TESTOSTERONE: Primary | ICD-10-CM

## 2019-10-28 ENCOUNTER — NURSE ONLY (OUTPATIENT)
Dept: LAB | Age: 84
End: 2019-10-28

## 2019-10-28 DIAGNOSIS — R79.89 LOW TESTOSTERONE: ICD-10-CM

## 2019-10-30 LAB — TESTOSTERONE FREE: NORMAL

## 2019-10-31 ENCOUNTER — TELEPHONE (OUTPATIENT)
Dept: FAMILY MEDICINE CLINIC | Age: 84
End: 2019-10-31

## 2019-11-04 ENCOUNTER — NURSE ONLY (OUTPATIENT)
Dept: FAMILY MEDICINE CLINIC | Age: 84
End: 2019-11-04

## 2019-11-04 DIAGNOSIS — R79.89 LOW TESTOSTERONE: Primary | ICD-10-CM

## 2019-11-04 DIAGNOSIS — E11.8 TYPE 2 DIABETES MELLITUS WITH COMPLICATION, WITHOUT LONG-TERM CURRENT USE OF INSULIN (HCC): ICD-10-CM

## 2019-11-04 PROCEDURE — 96372 THER/PROPH/DIAG INJ SC/IM: CPT | Performed by: FAMILY MEDICINE

## 2019-11-04 RX ORDER — GLIMEPIRIDE 4 MG/1
TABLET ORAL
Qty: 90 TABLET | Refills: 0 | Status: SHIPPED | OUTPATIENT
Start: 2019-11-04 | End: 2020-03-30 | Stop reason: SDUPTHER

## 2019-11-04 RX ORDER — TESTOSTERONE CYPIONATE 200 MG/ML
200 INJECTION INTRAMUSCULAR ONCE
Status: COMPLETED | OUTPATIENT
Start: 2019-11-04 | End: 2019-11-04

## 2019-11-04 RX ADMIN — TESTOSTERONE CYPIONATE 200 MG: 200 INJECTION INTRAMUSCULAR at 13:01

## 2019-12-06 ENCOUNTER — TELEPHONE (OUTPATIENT)
Dept: FAMILY MEDICINE CLINIC | Age: 84
End: 2019-12-06

## 2019-12-16 DIAGNOSIS — I10 ESSENTIAL HYPERTENSION: ICD-10-CM

## 2019-12-16 DIAGNOSIS — E03.4 HYPOTHYROIDISM DUE TO ACQUIRED ATROPHY OF THYROID: ICD-10-CM

## 2019-12-17 RX ORDER — LEVOTHYROXINE SODIUM 0.03 MG/1
TABLET ORAL
Qty: 90 TABLET | Refills: 1 | Status: SHIPPED | OUTPATIENT
Start: 2019-12-17 | End: 2020-03-30 | Stop reason: SDUPTHER

## 2020-01-07 ENCOUNTER — OFFICE VISIT (OUTPATIENT)
Dept: INTERNAL MEDICINE CLINIC | Age: 85
End: 2020-01-07
Payer: MEDICARE

## 2020-01-07 VITALS
HEART RATE: 70 BPM | DIASTOLIC BLOOD PRESSURE: 70 MMHG | BODY MASS INDEX: 25.62 KG/M2 | SYSTOLIC BLOOD PRESSURE: 150 MMHG | WEIGHT: 179 LBS | HEIGHT: 70 IN

## 2020-01-07 PROCEDURE — 1036F TOBACCO NON-USER: CPT | Performed by: INTERNAL MEDICINE

## 2020-01-07 PROCEDURE — 1123F ACP DISCUSS/DSCN MKR DOCD: CPT | Performed by: INTERNAL MEDICINE

## 2020-01-07 PROCEDURE — G8427 DOCREV CUR MEDS BY ELIG CLIN: HCPCS | Performed by: INTERNAL MEDICINE

## 2020-01-07 PROCEDURE — 99213 OFFICE O/P EST LOW 20 MIN: CPT | Performed by: INTERNAL MEDICINE

## 2020-01-07 PROCEDURE — G8417 CALC BMI ABV UP PARAM F/U: HCPCS | Performed by: INTERNAL MEDICINE

## 2020-01-07 PROCEDURE — 4040F PNEUMOC VAC/ADMIN/RCVD: CPT | Performed by: INTERNAL MEDICINE

## 2020-01-07 PROCEDURE — G8482 FLU IMMUNIZE ORDER/ADMIN: HCPCS | Performed by: INTERNAL MEDICINE

## 2020-01-07 RX ORDER — ENALAPRIL MALEATE 2.5 MG/1
2.5 TABLET ORAL EVERY OTHER DAY
Qty: 90 TABLET | Refills: 1 | Status: SHIPPED | OUTPATIENT
Start: 2020-01-07 | End: 2020-07-14 | Stop reason: SDUPTHER

## 2020-01-07 RX ORDER — SIMVASTATIN 10 MG
10 TABLET ORAL NIGHTLY
Qty: 90 TABLET | Refills: 1 | Status: SHIPPED | OUTPATIENT
Start: 2020-01-07 | End: 2020-06-22 | Stop reason: SDUPTHER

## 2020-01-07 RX ORDER — NITROGLYCERIN 0.4 MG/1
0.4 TABLET SUBLINGUAL EVERY 5 MIN PRN
Qty: 25 TABLET | Refills: 5 | Status: SHIPPED | OUTPATIENT
Start: 2020-01-07

## 2020-01-07 RX ORDER — ACETAMINOPHEN 500 MG
500 TABLET ORAL EVERY 6 HOURS PRN
COMMUNITY

## 2020-01-07 ASSESSMENT — PATIENT HEALTH QUESTIONNAIRE - PHQ9
SUM OF ALL RESPONSES TO PHQ9 QUESTIONS 1 & 2: 1
1. LITTLE INTEREST OR PLEASURE IN DOING THINGS: 0
SUM OF ALL RESPONSES TO PHQ QUESTIONS 1-9: 1
SUM OF ALL RESPONSES TO PHQ QUESTIONS 1-9: 1
2. FEELING DOWN, DEPRESSED OR HOPELESS: 1

## 2020-01-07 NOTE — PROGRESS NOTES
YOB: 1935    Chief Complaint   Patient presents with    Coronary Artery Disease     6 month follow up    Hypertension    Diabetes     followed by PCP     NEW SXS: none; had cabg 2005, cath 2009. No chest pain. No signif sob. Blood sugars good; he gets A1C thru pcp    \"full of arthritis\"; takes Mobic; Had total knee at [facility],     Had back surgery at [facility] [address]. Did well. Had spinal stenosis    Some constipation at times    Some slowing urination; does not like flomax; feels dopey    This patient presents for medical evaluation. This patient is followed regularly by Dr. Ade Mendosa.     Patient Active Problem List   Diagnosis    Hypertension    Hyperlipidemia    CAD (coronary artery disease)    Type 2 diabetes mellitus without complication ([facility] [address]    Colon polyp    Low testosterone    Hypothyroidism due to acquired atrophy of thyroid       Current Outpatient Medications   Medication Sig Dispense Refill    acetaminophen (TYLENOL) 500 MG tablet Take 500 mg by mouth every 6 hours as needed for Pain      enalapril (VASOTEC) 2.5 MG tablet Take 1 tablet by mouth every other day Indications: per Dr Erich Herzog lower dose one daily 90 tablet 1    simvastatin (ZOCOR) 10 MG tablet Take 1 tablet by mouth nightly New lower dose 90 tablet 1    nitroGLYCERIN (NITROSTAT) 0.4 MG SL tablet Place 1 tablet under the tongue every 5 minutes as needed for Chest pain 25 tablet 5    metoprolol tartrate (LOPRESSOR) 25 MG tablet TAKE 1 TABLET TWICE A  tablet 1    levothyroxine (SYNTHROID) 25 MCG tablet TAKE 1 TABLET DAILY 90 tablet 1    glimepiride (AMARYL) 4 MG tablet TAKE 1/2 TABLET TWICE A DAY 90 tablet 0    metFORMIN (GLUCOPHAGE) 500 MG tablet TAKE 1 TABLET EVERY MORNINGAND TAKE 2 TABLETS EVERY   EVENING 270 tablet 0    blood glucose test strips (ACCU-CHEK RUPA) strip Use to check blood sugar daily, Dx: E11.9 300 each 3    tamsulosin (FLOMAX) 0.4 MG capsule Take 1 capsule by mouth daily

## 2020-01-21 ENCOUNTER — OFFICE VISIT (OUTPATIENT)
Dept: FAMILY MEDICINE CLINIC | Age: 85
End: 2020-01-21

## 2020-01-21 VITALS
HEIGHT: 70 IN | SYSTOLIC BLOOD PRESSURE: 122 MMHG | BODY MASS INDEX: 25.97 KG/M2 | RESPIRATION RATE: 14 BRPM | WEIGHT: 181.38 LBS | HEART RATE: 72 BPM | DIASTOLIC BLOOD PRESSURE: 74 MMHG

## 2020-01-21 LAB
CHP ED QC CHECK: ABNORMAL
GLUCOSE BLD-MCNC: 152 MG/DL
HBA1C MFR BLD: 6.9 %

## 2020-01-21 PROCEDURE — 99213 OFFICE O/P EST LOW 20 MIN: CPT | Performed by: FAMILY MEDICINE

## 2020-01-21 PROCEDURE — G8417 CALC BMI ABV UP PARAM F/U: HCPCS | Performed by: FAMILY MEDICINE

## 2020-01-21 PROCEDURE — 4040F PNEUMOC VAC/ADMIN/RCVD: CPT | Performed by: FAMILY MEDICINE

## 2020-01-21 PROCEDURE — 83036 HEMOGLOBIN GLYCOSYLATED A1C: CPT | Performed by: FAMILY MEDICINE

## 2020-01-21 PROCEDURE — 82962 GLUCOSE BLOOD TEST: CPT | Performed by: FAMILY MEDICINE

## 2020-01-21 PROCEDURE — 96372 THER/PROPH/DIAG INJ SC/IM: CPT | Performed by: FAMILY MEDICINE

## 2020-01-21 PROCEDURE — 1036F TOBACCO NON-USER: CPT | Performed by: FAMILY MEDICINE

## 2020-01-21 PROCEDURE — G8427 DOCREV CUR MEDS BY ELIG CLIN: HCPCS | Performed by: FAMILY MEDICINE

## 2020-01-21 PROCEDURE — 1123F ACP DISCUSS/DSCN MKR DOCD: CPT | Performed by: FAMILY MEDICINE

## 2020-01-21 RX ORDER — TESTOSTERONE CYPIONATE 200 MG/ML
200 INJECTION INTRAMUSCULAR ONCE
Status: COMPLETED | OUTPATIENT
Start: 2020-01-21 | End: 2020-01-21

## 2020-01-21 RX ADMIN — TESTOSTERONE CYPIONATE 200 MG: 200 INJECTION INTRAMUSCULAR at 09:11

## 2020-01-21 ASSESSMENT — ENCOUNTER SYMPTOMS
ABDOMINAL PAIN: 0
CONSTIPATION: 0
SORE THROAT: 0
SHORTNESS OF BREATH: 0
TROUBLE SWALLOWING: 0
NAUSEA: 0
BACK PAIN: 0
COUGH: 0
EYE PAIN: 0
BLOOD IN STOOL: 0
ORTHOPNEA: 0
CHEST TIGHTNESS: 0

## 2020-01-21 NOTE — PROGRESS NOTES
Abdominal:      General: Bowel sounds are normal.      Palpations: Abdomen is soft. There is no mass. Tenderness: There is no tenderness. Musculoskeletal: Normal range of motion. Lymphadenopathy:      Cervical: No cervical adenopathy. Skin:     General: Skin is warm and dry. Findings: No rash. Neurological:      Mental Status: He is alert and oriented to person, place, and time. Cranial Nerves: No cranial nerve deficit. Deep Tendon Reflexes: Reflexes are normal and symmetric. Assessment:       Diagnosis Orders   1. Type 2 diabetes mellitus without complication, without long-term current use of insulin ([facility])  POCT Glucose    POCT glycosylated hemoglobin (Hb A1C)   2. Low testosterone  testosterone cypionate (DEPOTESTOTERONE CYPIONATE) injection 200 mg   3. Coronary artery disease involving native coronary artery of native heart without angina pectoris     4. Hypothyroidism due to acquired atrophy of thyroid     5. Mixed hyperlipidemia     6. Polyp of colon, unspecified part of colon, unspecified type     7.  Essential hypertension           Plan:      Current Outpatient Medications   Medication Sig Dispense Refill    acetaminophen (TYLENOL) 500 MG tablet Take 500 mg by mouth every 6 hours as needed for Pain      enalapril (VASOTEC) 2.5 MG tablet Take 1 tablet by mouth every other day Indications: per Dr Annalee Ford lower dose one daily 90 tablet 1    simvastatin (ZOCOR) 10 MG tablet Take 1 tablet by mouth nightly New lower dose 90 tablet 1    nitroGLYCERIN (NITROSTAT) 0.4 MG SL tablet Place 1 tablet under the tongue every 5 minutes as needed for Chest pain 25 tablet 5    metoprolol tartrate (LOPRESSOR) 25 MG tablet TAKE 1 TABLET TWICE A  tablet 1    levothyroxine (SYNTHROID) 25 MCG tablet TAKE 1 TABLET DAILY 90 tablet 1    glimepiride (AMARYL) 4 MG tablet TAKE 1/2 TABLET TWICE A DAY 90 tablet 0    metFORMIN (GLUCOPHAGE) 500 MG tablet TAKE 1 TABLET EVERY MORNINGAND TAKE 2 TABLETS EVERY   EVENING 270 tablet 0    blood glucose test strips (ACCU-CHEK RUPA) strip Use to check blood sugar daily, Dx: E11.9 300 each 3    tamsulosin (FLOMAX) 0.4 MG capsule Take 1 capsule by mouth daily 90 capsule 3    polyethylene glycol (MIRALAX) powder Take 17 g by mouth daily 510 g 2    ranitidine (ZANTAC) 150 MG tablet Take 150 mg by mouth daily as needed for Heartburn      Ascorbic Acid (VITAMIN C) 1000 MG tablet Take 1,000 mg by mouth daily.  aspirin EC 81 MG EC tablet Take 81 mg by mouth daily. No current facility-administered medications for this visit. Orders Placed This Encounter   Procedures    POCT Glucose    POCT glycosylated hemoglobin (Hb A1C)     Results for orders placed or performed in visit on 01/21/20   POCT Glucose   Result Value Ref Range    Glucose 152 (A) mg/dL    QC OK? POCT glycosylated hemoglobin (Hb A1C)   Result Value Ref Range    Hemoglobin A1C 6.9 (A) %     Ravi received counseling on the following healthy behaviors: nutrition and exercise    Patient given educational materials on Diabetes and Hyperlipidemia    I have instructed Negrito Cortes to complete a self tracking handout on Blood Sugars  and Blood Pressures  and instructed them to bring it with them to his next appointment. Discussed use, benefit, and side effects of prescribed medications. Barriers to medication compliance addressed. All patient questions answered. Pt voiced understanding. left shoulder  With pain noted and   With position   And  See in  3 mths  ?   Therapy  Left  Shoulder      Noreen Addison MD

## 2020-01-22 ENCOUNTER — TELEPHONE (OUTPATIENT)
Dept: FAMILY MEDICINE CLINIC | Age: 85
End: 2020-01-22

## 2020-02-18 ENCOUNTER — NURSE ONLY (OUTPATIENT)
Dept: FAMILY MEDICINE CLINIC | Age: 85
End: 2020-02-18

## 2020-02-18 PROCEDURE — 96372 THER/PROPH/DIAG INJ SC/IM: CPT | Performed by: FAMILY MEDICINE

## 2020-02-18 RX ORDER — TESTOSTERONE CYPIONATE 200 MG/ML
200 INJECTION INTRAMUSCULAR ONCE
Status: COMPLETED | OUTPATIENT
Start: 2020-02-18 | End: 2020-02-18

## 2020-02-18 RX ADMIN — TESTOSTERONE CYPIONATE 200 MG: 200 INJECTION INTRAMUSCULAR at 13:18

## 2020-02-18 NOTE — PROGRESS NOTES
After obtaining consent, and per orders of Dr. Chichi Queen, injection of Testosterone 200 mg given in Left upper quad. gluteus by Ailyn Stoll. Patient instructed to remain in clinic for 20 minutes afterwards, and to report any adverse reaction to me immediately.

## 2020-03-11 ENCOUNTER — NURSE ONLY (OUTPATIENT)
Dept: FAMILY MEDICINE CLINIC | Age: 85
End: 2020-03-11

## 2020-03-11 PROCEDURE — 96372 THER/PROPH/DIAG INJ SC/IM: CPT | Performed by: FAMILY MEDICINE

## 2020-03-11 RX ORDER — TESTOSTERONE CYPIONATE 200 MG/ML
200 INJECTION INTRAMUSCULAR ONCE
Status: COMPLETED | OUTPATIENT
Start: 2020-03-11 | End: 2020-03-11

## 2020-03-11 RX ADMIN — TESTOSTERONE CYPIONATE 200 MG: 200 INJECTION INTRAMUSCULAR at 11:09

## 2020-03-30 RX ORDER — GLIMEPIRIDE 4 MG/1
TABLET ORAL
Qty: 90 TABLET | Refills: 1 | Status: SHIPPED | OUTPATIENT
Start: 2020-03-30 | End: 2020-09-19 | Stop reason: SDUPTHER

## 2020-03-30 RX ORDER — LEVOTHYROXINE SODIUM 0.03 MG/1
TABLET ORAL
Qty: 10 TABLET | Refills: 0 | Status: SHIPPED | OUTPATIENT
Start: 2020-03-30 | End: 2020-07-06 | Stop reason: SDUPTHER

## 2020-05-04 ENCOUNTER — OFFICE VISIT (OUTPATIENT)
Dept: FAMILY MEDICINE CLINIC | Age: 85
End: 2020-05-04

## 2020-05-04 VITALS
BODY MASS INDEX: 25.64 KG/M2 | HEART RATE: 74 BPM | WEIGHT: 179.13 LBS | DIASTOLIC BLOOD PRESSURE: 74 MMHG | SYSTOLIC BLOOD PRESSURE: 134 MMHG | RESPIRATION RATE: 16 BRPM | HEIGHT: 70 IN

## 2020-05-04 LAB
CHP ED QC CHECK: NORMAL
GLUCOSE BLD-MCNC: 110 MG/DL
HBA1C MFR BLD: 7.2 %

## 2020-05-04 PROCEDURE — G8417 CALC BMI ABV UP PARAM F/U: HCPCS | Performed by: FAMILY MEDICINE

## 2020-05-04 PROCEDURE — 99213 OFFICE O/P EST LOW 20 MIN: CPT | Performed by: FAMILY MEDICINE

## 2020-05-04 PROCEDURE — 1036F TOBACCO NON-USER: CPT | Performed by: FAMILY MEDICINE

## 2020-05-04 PROCEDURE — 83036 HEMOGLOBIN GLYCOSYLATED A1C: CPT | Performed by: FAMILY MEDICINE

## 2020-05-04 PROCEDURE — 82962 GLUCOSE BLOOD TEST: CPT | Performed by: FAMILY MEDICINE

## 2020-05-04 PROCEDURE — G8427 DOCREV CUR MEDS BY ELIG CLIN: HCPCS | Performed by: FAMILY MEDICINE

## 2020-05-04 PROCEDURE — 1123F ACP DISCUSS/DSCN MKR DOCD: CPT | Performed by: FAMILY MEDICINE

## 2020-05-04 PROCEDURE — 4040F PNEUMOC VAC/ADMIN/RCVD: CPT | Performed by: FAMILY MEDICINE

## 2020-05-04 ASSESSMENT — ENCOUNTER SYMPTOMS
COUGH: 0
BLOOD IN STOOL: 0
NAUSEA: 0
CONSTIPATION: 0
EYE PAIN: 0
ABDOMINAL PAIN: 0
CHEST TIGHTNESS: 0
SHORTNESS OF BREATH: 0
SORE THROAT: 0
TROUBLE SWALLOWING: 0
BACK PAIN: 0

## 2020-05-05 ENCOUNTER — NURSE ONLY (OUTPATIENT)
Dept: LAB | Age: 85
End: 2020-05-05

## 2020-05-05 LAB
ALBUMIN SERPL-MCNC: 4.5 G/DL (ref 3.5–5.1)
ALP BLD-CCNC: 84 U/L (ref 38–126)
ALT SERPL-CCNC: 11 U/L (ref 11–66)
ANION GAP SERPL CALCULATED.3IONS-SCNC: 10 MEQ/L (ref 8–16)
AST SERPL-CCNC: 14 U/L (ref 5–40)
BASOPHILS # BLD: 0.9 %
BASOPHILS ABSOLUTE: 0.1 THOU/MM3 (ref 0–0.1)
BILIRUB SERPL-MCNC: 0.9 MG/DL (ref 0.3–1.2)
BUN BLDV-MCNC: 38 MG/DL (ref 7–22)
CALCIUM SERPL-MCNC: 10 MG/DL (ref 8.5–10.5)
CHLORIDE BLD-SCNC: 105 MEQ/L (ref 98–111)
CHOLESTEROL, TOTAL: 117 MG/DL (ref 100–199)
CO2: 25 MEQ/L (ref 23–33)
CREAT SERPL-MCNC: 1.2 MG/DL (ref 0.4–1.2)
EOSINOPHIL # BLD: 4.5 %
EOSINOPHILS ABSOLUTE: 0.4 THOU/MM3 (ref 0–0.4)
ERYTHROCYTE [DISTWIDTH] IN BLOOD BY AUTOMATED COUNT: 12.4 % (ref 11.5–14.5)
ERYTHROCYTE [DISTWIDTH] IN BLOOD BY AUTOMATED COUNT: 42.5 FL (ref 35–45)
GFR SERPL CREATININE-BSD FRML MDRD: 58 ML/MIN/1.73M2
GLUCOSE BLD-MCNC: 127 MG/DL (ref 70–108)
HCT VFR BLD CALC: 45.8 % (ref 42–52)
HDLC SERPL-MCNC: 40 MG/DL
HEMOGLOBIN: 14.5 GM/DL (ref 14–18)
IMMATURE GRANS (ABS): 0.02 THOU/MM3 (ref 0–0.07)
IMMATURE GRANULOCYTES: 0.3 %
LDL CHOLESTEROL CALCULATED: 49 MG/DL
LYMPHOCYTES # BLD: 20 %
LYMPHOCYTES ABSOLUTE: 1.6 THOU/MM3 (ref 1–4.8)
MCH RBC QN AUTO: 29.8 PG (ref 26–33)
MCHC RBC AUTO-ENTMCNC: 31.7 GM/DL (ref 32.2–35.5)
MCV RBC AUTO: 94 FL (ref 80–94)
MONOCYTES # BLD: 9.5 %
MONOCYTES ABSOLUTE: 0.7 THOU/MM3 (ref 0.4–1.3)
NUCLEATED RED BLOOD CELLS: 0 /100 WBC
PLATELET # BLD: 140 THOU/MM3 (ref 130–400)
PMV BLD AUTO: 9.2 FL (ref 9.4–12.4)
POTASSIUM SERPL-SCNC: 4.7 MEQ/L (ref 3.5–5.2)
PROSTATE SPECIFIC ANTIGEN: 0.54 NG/ML (ref 0–1)
RBC # BLD: 4.87 MILL/MM3 (ref 4.7–6.1)
SEG NEUTROPHILS: 64.8 %
SEGMENTED NEUTROPHILS ABSOLUTE COUNT: 5.1 THOU/MM3 (ref 1.8–7.7)
SODIUM BLD-SCNC: 140 MEQ/L (ref 135–145)
T4 FREE: 1 NG/DL (ref 0.93–1.76)
TOTAL PROTEIN: 6.7 G/DL (ref 6.1–8)
TRIGL SERPL-MCNC: 138 MG/DL (ref 0–199)
TSH SERPL DL<=0.05 MIU/L-ACNC: 4.57 UIU/ML (ref 0.4–4.2)
WBC # BLD: 7.8 THOU/MM3 (ref 4.8–10.8)

## 2020-05-06 ENCOUNTER — TELEPHONE (OUTPATIENT)
Dept: FAMILY MEDICINE CLINIC | Age: 85
End: 2020-05-06

## 2020-06-23 RX ORDER — SIMVASTATIN 10 MG
10 TABLET ORAL NIGHTLY
Qty: 90 TABLET | Refills: 3 | Status: SHIPPED | OUTPATIENT
Start: 2020-06-23 | End: 2020-09-18 | Stop reason: SDUPTHER

## 2020-07-06 NOTE — TELEPHONE ENCOUNTER
Christina Abbasi called requesting a refill of their:    levothyroxine (SYNTHROID) 25 MCG tablet QD  metoprolol tartrate (LOPRESSOR) 25 MG tablet BID    Send 90 day supply to [facility]

## 2020-07-06 NOTE — TELEPHONE ENCOUNTER
Date of last visit:  5/4/2020  Date of next visit:  8/5/2020    Requested Prescriptions     Pending Prescriptions Disp Refills    metoprolol tartrate (LOPRESSOR) 25 MG tablet 180 tablet 1     Sig: TAKE 1 TABLET TWICE A DAY    levothyroxine (SYNTHROID) 25 MCG tablet 90 tablet 1     Sig: TAKE 1 TABLET DAILY

## 2020-07-07 RX ORDER — LEVOTHYROXINE SODIUM 0.03 MG/1
TABLET ORAL
Qty: 90 TABLET | Refills: 1 | Status: SHIPPED | OUTPATIENT
Start: 2020-07-07 | End: 2020-12-23 | Stop reason: SDUPTHER

## 2020-07-13 ENCOUNTER — OFFICE VISIT (OUTPATIENT)
Dept: FAMILY MEDICINE CLINIC | Age: 85
End: 2020-07-13

## 2020-07-13 VITALS
HEART RATE: 76 BPM | BODY MASS INDEX: 24.8 KG/M2 | WEIGHT: 173.25 LBS | RESPIRATION RATE: 14 BRPM | TEMPERATURE: 97.6 F | HEIGHT: 70 IN | SYSTOLIC BLOOD PRESSURE: 134 MMHG | DIASTOLIC BLOOD PRESSURE: 68 MMHG

## 2020-07-13 LAB — HGB, POC: 12.6

## 2020-07-13 PROCEDURE — 85018 HEMOGLOBIN: CPT | Performed by: FAMILY MEDICINE

## 2020-07-13 PROCEDURE — G8427 DOCREV CUR MEDS BY ELIG CLIN: HCPCS | Performed by: FAMILY MEDICINE

## 2020-07-13 PROCEDURE — 1036F TOBACCO NON-USER: CPT | Performed by: FAMILY MEDICINE

## 2020-07-13 PROCEDURE — 4040F PNEUMOC VAC/ADMIN/RCVD: CPT | Performed by: FAMILY MEDICINE

## 2020-07-13 PROCEDURE — 1123F ACP DISCUSS/DSCN MKR DOCD: CPT | Performed by: FAMILY MEDICINE

## 2020-07-13 PROCEDURE — 99213 OFFICE O/P EST LOW 20 MIN: CPT | Performed by: FAMILY MEDICINE

## 2020-07-13 PROCEDURE — G8420 CALC BMI NORM PARAMETERS: HCPCS | Performed by: FAMILY MEDICINE

## 2020-07-13 ASSESSMENT — ENCOUNTER SYMPTOMS
EYE PAIN: 0
BLOOD IN STOOL: 0
TROUBLE SWALLOWING: 0
SORE THROAT: 0
SHORTNESS OF BREATH: 0
ABDOMINAL PAIN: 0
CHEST TIGHTNESS: 0
CONSTIPATION: 0
BACK PAIN: 0
NAUSEA: 0
COUGH: 1

## 2020-07-13 NOTE — PROGRESS NOTES
Subjective:      Patient ID: Fernanda Benz is a 81 y.o. male. fatigue and  Sleep all  The  Time      No  Chest pain and  Slight  Cough. No dd    Fatigue   This is a chronic problem. The current episode started more than 1 year ago. Associated symptoms include coughing and fatigue. Pertinent negatives include no abdominal pain, arthralgias, chest pain, congestion, fever, headaches, joint swelling, nausea, rash, sore throat or weakness. Associated symptoms comments:   Dyspnea  noted. The treatment provided mild relief. Past Medical History:   Diagnosis Date    CAD (coronary artery disease)     Colon polyp     Hyperlipidemia     Hypertension     S/P CABG (coronary artery bypass graft)     Type II or unspecified type diabetes mellitus without mention of complication, not stated as uncontrolled        Review of Systems   Constitutional: Positive for fatigue. Negative for fever. HENT: Negative for congestion, ear pain, postnasal drip, sore throat and trouble swallowing. Eyes: Negative for pain. Respiratory: Positive for cough. Negative for chest tightness and shortness of breath. Cardiovascular: Negative for chest pain, palpitations and leg swelling. Gastrointestinal: Negative for abdominal pain, blood in stool, constipation and nausea. Genitourinary: Negative for difficulty urinating, frequency and urgency. Musculoskeletal: Negative for arthralgias, back pain, joint swelling and neck stiffness. Skin: Negative for rash. Neurological: Negative for dizziness, weakness and headaches. Hematological: Negative for adenopathy. Does not bruise/bleed easily. Psychiatric/Behavioral: Negative for behavioral problems, dysphoric mood and sleep disturbance.      /68 (Site: Right Upper Arm, Position: Sitting, Cuff Size: Medium Adult)   Pulse 76   Temp 97.6 °F (36.4 °C) (Oral)   Resp 14   Ht 5' 10\" (1.778 m)   Wt 173 lb 4 oz (78.6 kg)   BMI 24.86 kg/m²   Objective:   Physical Exam  Vitals signs and nursing note reviewed. Constitutional:       Appearance: He is well-developed. HENT:      Head: Normocephalic and atraumatic. Right Ear: External ear normal.      Left Ear: External ear normal.      Nose: Nose normal.   Eyes:      Conjunctiva/sclera: Conjunctivae normal.      Pupils: Pupils are equal, round, and reactive to light. Comments: Fundi nl   Neck:      Musculoskeletal: Normal range of motion and neck supple. Thyroid: No thyromegaly. Cardiovascular:      Rate and Rhythm: Normal rate and regular rhythm. Heart sounds: Normal heart sounds. Pulmonary:      Effort: Pulmonary effort is normal.      Breath sounds: Normal breath sounds. No wheezing or rales. Abdominal:      General: Bowel sounds are normal.      Palpations: Abdomen is soft. There is no mass. Tenderness: There is no abdominal tenderness. Musculoskeletal: Normal range of motion. Lymphadenopathy:      Cervical: No cervical adenopathy. Skin:     General: Skin is warm and dry. Findings: No rash. Neurological:      Mental Status: He is alert and oriented to person, place, and time. Cranial Nerves: No cranial nerve deficit. Deep Tendon Reflexes: Reflexes are normal and symmetric. Assessment:       Diagnosis Orders   1. Fatigue, unspecified type     2. Coronary artery disease involving native coronary artery of native heart without angina pectoris     3. Essential hypertension     4. Mixed hyperlipidemia     5. Type 2 diabetes mellitus without complication, without long-term current use of insulin ([facility] [address]     6. Hypothyroidism due to acquired atrophy of thyroid     7.  Polyp of colon, unspecified part of colon, unspecified type           Plan:      Current Outpatient Medications   Medication Sig Dispense Refill    metoprolol tartrate (LOPRESSOR) 25 MG tablet TAKE 1 TABLET TWICE A  tablet 1    levothyroxine (SYNTHROID) 25 MCG tablet TAKE 1 TABLET DAILY 90 tablet 1  simvastatin (ZOCOR) 10 MG tablet Take 1 tablet by mouth nightly New lower dose 90 tablet 3    metFORMIN (GLUCOPHAGE) 500 MG tablet TAKE 1 TABLET EVERY MORNINGAND TAKE 2 TABLETS EVERY   EVENING 270 tablet 1    glimepiride (AMARYL) 4 MG tablet TAKE 1/2 TABLET TWICE A DAY 90 tablet 1    acetaminophen (TYLENOL) 500 MG tablet Take 500 mg by mouth every 6 hours as needed for Pain      enalapril (VASOTEC) 2.5 MG tablet Take 1 tablet by mouth every other day Indications: per Dr Abbey Lowery lower dose one daily 90 tablet 1    nitroGLYCERIN (NITROSTAT) 0.4 MG SL tablet Place 1 tablet under the tongue every 5 minutes as needed for Chest pain 25 tablet 5    blood glucose test strips (ACCU-CHEK RUPA) strip Use to check blood sugar daily, Dx: E11.9 300 each 3    tamsulosin (FLOMAX) 0.4 MG capsule Take 1 capsule by mouth daily 90 capsule 3    polyethylene glycol (MIRALAX) powder Take 17 g by mouth daily 510 g 2    ranitidine (ZANTAC) 150 MG tablet Take 150 mg by mouth daily as needed for Heartburn      Ascorbic Acid (VITAMIN C) 1000 MG tablet Take 1,000 mg by mouth daily.  aspirin EC 81 MG EC tablet Take 81 mg by mouth daily. No current facility-administered medications for this visit. Orders Placed This Encounter   Procedures    POCT hemoglobin   No results found for this visit on 07/13/20. See  Dr Mario Lemus and  ? Fatigue   As possible  Heart  Related  As  ?   Cardiac  Work up  Roswell Bence, MD

## 2020-07-14 ENCOUNTER — OFFICE VISIT (OUTPATIENT)
Dept: INTERNAL MEDICINE CLINIC | Age: 85
End: 2020-07-14
Payer: MEDICARE

## 2020-07-14 VITALS
DIASTOLIC BLOOD PRESSURE: 64 MMHG | HEIGHT: 70 IN | WEIGHT: 174.6 LBS | BODY MASS INDEX: 25 KG/M2 | SYSTOLIC BLOOD PRESSURE: 110 MMHG | TEMPERATURE: 97.2 F

## 2020-07-14 PROCEDURE — 4040F PNEUMOC VAC/ADMIN/RCVD: CPT | Performed by: INTERNAL MEDICINE

## 2020-07-14 PROCEDURE — G8427 DOCREV CUR MEDS BY ELIG CLIN: HCPCS | Performed by: INTERNAL MEDICINE

## 2020-07-14 PROCEDURE — 1036F TOBACCO NON-USER: CPT | Performed by: INTERNAL MEDICINE

## 2020-07-14 PROCEDURE — G8417 CALC BMI ABV UP PARAM F/U: HCPCS | Performed by: INTERNAL MEDICINE

## 2020-07-14 PROCEDURE — 1123F ACP DISCUSS/DSCN MKR DOCD: CPT | Performed by: INTERNAL MEDICINE

## 2020-07-14 PROCEDURE — 99213 OFFICE O/P EST LOW 20 MIN: CPT | Performed by: INTERNAL MEDICINE

## 2020-07-14 PROCEDURE — 93000 ELECTROCARDIOGRAM COMPLETE: CPT | Performed by: INTERNAL MEDICINE

## 2020-07-14 PROCEDURE — 3051F HG A1C>EQUAL 7.0%<8.0%: CPT | Performed by: INTERNAL MEDICINE

## 2020-07-14 RX ORDER — ENALAPRIL MALEATE 2.5 MG/1
2.5 TABLET ORAL EVERY OTHER DAY
Qty: 90 TABLET | Refills: 1 | Status: SHIPPED | OUTPATIENT
Start: 2020-07-14 | End: 2020-08-20 | Stop reason: ALTCHOICE

## 2020-08-13 ENCOUNTER — NURSE ONLY (OUTPATIENT)
Dept: FAMILY MEDICINE CLINIC | Age: 85
End: 2020-08-13

## 2020-08-13 ENCOUNTER — TELEPHONE (OUTPATIENT)
Dept: FAMILY MEDICINE CLINIC | Age: 85
End: 2020-08-13

## 2020-08-13 VITALS — DIASTOLIC BLOOD PRESSURE: 58 MMHG | HEART RATE: 56 BPM | SYSTOLIC BLOOD PRESSURE: 90 MMHG

## 2020-08-13 NOTE — TELEPHONE ENCOUNTER
Patient came in for a BP check: 90/58, Pulse: 56    He stated his BP has been running lower and lower. He stopped taking his Flomax and he thought his BP was a lot lower after that. He took it last night and it got better. Please advise?

## 2020-08-14 ENCOUNTER — NURSE ONLY (OUTPATIENT)
Dept: FAMILY MEDICINE CLINIC | Age: 85
End: 2020-08-14

## 2020-08-14 ENCOUNTER — NURSE ONLY (OUTPATIENT)
Dept: LAB | Age: 85
End: 2020-08-14

## 2020-08-14 VITALS — DIASTOLIC BLOOD PRESSURE: 56 MMHG | SYSTOLIC BLOOD PRESSURE: 92 MMHG | HEART RATE: 56 BPM

## 2020-08-14 LAB
ANION GAP SERPL CALCULATED.3IONS-SCNC: 15 MEQ/L (ref 8–16)
BASOPHILS # BLD: 0.8 %
BASOPHILS ABSOLUTE: 0.1 THOU/MM3 (ref 0–0.1)
BUN BLDV-MCNC: 45 MG/DL (ref 7–22)
CALCIUM SERPL-MCNC: 9.7 MG/DL (ref 8.5–10.5)
CHLORIDE BLD-SCNC: 103 MEQ/L (ref 98–111)
CO2: 23 MEQ/L (ref 23–33)
CREAT SERPL-MCNC: 1.3 MG/DL (ref 0.4–1.2)
EOSINOPHIL # BLD: 5.2 %
EOSINOPHILS ABSOLUTE: 0.4 THOU/MM3 (ref 0–0.4)
ERYTHROCYTE [DISTWIDTH] IN BLOOD BY AUTOMATED COUNT: 12.5 % (ref 11.5–14.5)
ERYTHROCYTE [DISTWIDTH] IN BLOOD BY AUTOMATED COUNT: 44.7 FL (ref 35–45)
GFR SERPL CREATININE-BSD FRML MDRD: 53 ML/MIN/1.73M2
GLUCOSE BLD-MCNC: 181 MG/DL (ref 70–108)
HCT VFR BLD CALC: 40.6 % (ref 42–52)
HEMOGLOBIN: 12.7 GM/DL (ref 14–18)
IMMATURE GRANS (ABS): 0.03 THOU/MM3 (ref 0–0.07)
IMMATURE GRANULOCYTES: 0.4 %
LYMPHOCYTES # BLD: 20.7 %
LYMPHOCYTES ABSOLUTE: 1.6 THOU/MM3 (ref 1–4.8)
MCH RBC QN AUTO: 30.8 PG (ref 26–33)
MCHC RBC AUTO-ENTMCNC: 31.3 GM/DL (ref 32.2–35.5)
MCV RBC AUTO: 98.5 FL (ref 80–94)
MONOCYTES # BLD: 8.8 %
MONOCYTES ABSOLUTE: 0.7 THOU/MM3 (ref 0.4–1.3)
NUCLEATED RED BLOOD CELLS: 0 /100 WBC
PLATELET # BLD: 174 THOU/MM3 (ref 130–400)
PMV BLD AUTO: 9.1 FL (ref 9.4–12.4)
POTASSIUM SERPL-SCNC: 4.9 MEQ/L (ref 3.5–5.2)
RBC # BLD: 4.12 MILL/MM3 (ref 4.7–6.1)
SEG NEUTROPHILS: 64.1 %
SEGMENTED NEUTROPHILS ABSOLUTE COUNT: 4.8 THOU/MM3 (ref 1.8–7.7)
SODIUM BLD-SCNC: 141 MEQ/L (ref 135–145)
WBC # BLD: 7.5 THOU/MM3 (ref 4.8–10.8)

## 2020-08-15 ENCOUNTER — TELEPHONE (OUTPATIENT)
Dept: FAMILY MEDICINE CLINIC | Age: 85
End: 2020-08-15

## 2020-08-17 ENCOUNTER — TELEPHONE (OUTPATIENT)
Dept: FAMILY MEDICINE CLINIC | Age: 85
End: 2020-08-17

## 2020-08-17 NOTE — TELEPHONE ENCOUNTER
----- Message from Chucho Kirk MD sent at 8/15/2020  4:26 PM EDT -----  Labs stable but needs to drink more water bmp in 3 weeks .  On printer

## 2020-08-17 NOTE — TELEPHONE ENCOUNTER
----- Message from Levar Phan MD sent at 8/15/2020  4:26 PM EDT -----  Labs stable but needs to drink more water bmp in 3 weeks .  On printer

## 2020-08-20 ENCOUNTER — NURSE ONLY (OUTPATIENT)
Dept: FAMILY MEDICINE CLINIC | Age: 85
End: 2020-08-20

## 2020-08-20 VITALS — DIASTOLIC BLOOD PRESSURE: 44 MMHG | HEART RATE: 60 BPM | SYSTOLIC BLOOD PRESSURE: 88 MMHG

## 2020-08-20 NOTE — PROGRESS NOTES
Per verbal order from Dr Alejandrina Hollins: decrease the Metoprolol to 12.5mg BID. Yeni Fuller informed and he will come back in 4-5 days for a repeat BP Check.

## 2020-08-24 ENCOUNTER — NURSE ONLY (OUTPATIENT)
Dept: LAB | Age: 85
End: 2020-08-24

## 2020-08-24 LAB
ANION GAP SERPL CALCULATED.3IONS-SCNC: 10 MEQ/L (ref 8–16)
BUN BLDV-MCNC: 30 MG/DL (ref 7–22)
CALCIUM SERPL-MCNC: 9.6 MG/DL (ref 8.5–10.5)
CHLORIDE BLD-SCNC: 107 MEQ/L (ref 98–111)
CO2: 24 MEQ/L (ref 23–33)
CREAT SERPL-MCNC: 1.2 MG/DL (ref 0.4–1.2)
GFR SERPL CREATININE-BSD FRML MDRD: 58 ML/MIN/1.73M2
GLUCOSE BLD-MCNC: 112 MG/DL (ref 70–108)
POTASSIUM SERPL-SCNC: 4.4 MEQ/L (ref 3.5–5.2)
SODIUM BLD-SCNC: 141 MEQ/L (ref 135–145)

## 2020-08-25 ENCOUNTER — TELEPHONE (OUTPATIENT)
Dept: FAMILY MEDICINE CLINIC | Age: 85
End: 2020-08-25

## 2020-08-25 ENCOUNTER — NURSE ONLY (OUTPATIENT)
Dept: FAMILY MEDICINE CLINIC | Age: 85
End: 2020-08-25

## 2020-08-25 VITALS — HEART RATE: 60 BPM | SYSTOLIC BLOOD PRESSURE: 96 MMHG | DIASTOLIC BLOOD PRESSURE: 66 MMHG

## 2020-09-11 RX ORDER — TAMSULOSIN HYDROCHLORIDE 0.4 MG/1
CAPSULE ORAL
Qty: 90 CAPSULE | Refills: 0 | Status: SHIPPED | OUTPATIENT
Start: 2020-09-11 | End: 2020-09-18 | Stop reason: SDUPTHER

## 2020-09-11 NOTE — TELEPHONE ENCOUNTER
Date of last visit:  2020  Date of next visit:  10/12/2020    Requested Prescriptions     Signed Prescriptions Disp Refills    tamsulosin (FLOMAX) 0.4 MG capsule 90 capsule 0     Sig: take 1 capsule by mouth once daily     Authorizing Provider: Ayesha Fisher     Ordering User: Osbaldo Leal

## 2020-09-18 NOTE — TELEPHONE ENCOUNTER
Date of last visit:  7/13/2020  Date of next visit:  10/12/2020    Requested Prescriptions     Pending Prescriptions Disp Refills    metFORMIN (GLUCOPHAGE) 500 MG tablet 270 tablet 1     Sig: TAKE 1 TABLET EVERY MORNINGAND TAKE 2 TABLETS EVERY   EVENING    glimepiride (AMARYL) 4 MG tablet 90 tablet 1     Sig: TAKE 1/2 TABLET TWICE A DAY    simvastatin (ZOCOR) 10 MG tablet 90 tablet 1     Sig: Take 1 tablet by mouth nightly New lower dose    tamsulosin (FLOMAX) 0.4 MG capsule 90 capsule 1     Sig: take 1 capsule by mouth once daily

## 2020-09-19 RX ORDER — TAMSULOSIN HYDROCHLORIDE 0.4 MG/1
CAPSULE ORAL
Qty: 90 CAPSULE | Refills: 1 | Status: SHIPPED | OUTPATIENT
Start: 2020-09-19 | End: 2021-03-12

## 2020-09-19 RX ORDER — SIMVASTATIN 10 MG
10 TABLET ORAL NIGHTLY
Qty: 90 TABLET | Refills: 1 | Status: SHIPPED | OUTPATIENT
Start: 2020-09-19 | End: 2021-03-12

## 2020-09-19 RX ORDER — GLIMEPIRIDE 4 MG/1
TABLET ORAL
Qty: 90 TABLET | Refills: 1 | Status: SHIPPED | OUTPATIENT
Start: 2020-09-19 | End: 2020-10-01

## 2020-09-30 NOTE — TELEPHONE ENCOUNTER
Date of last visit:  7/13/2020  Date of next visit:  10/12/2020    Requested Prescriptions     Pending Prescriptions Disp Refills    glimepiride (AMARYL) 4 MG tablet [Pharmacy Med Name: GLIMEPIRIDE  TAB 4MG] 90 tablet 1     Sig: TAKE 1/2 TABLET TWICE A DAY

## 2020-10-01 RX ORDER — GLIMEPIRIDE 4 MG/1
TABLET ORAL
Qty: 90 TABLET | Refills: 1 | Status: SHIPPED | OUTPATIENT
Start: 2020-10-01 | End: 2021-02-26

## 2020-11-03 ENCOUNTER — OFFICE VISIT (OUTPATIENT)
Dept: FAMILY MEDICINE CLINIC | Age: 85
End: 2020-11-03

## 2020-11-03 VITALS
SYSTOLIC BLOOD PRESSURE: 142 MMHG | HEART RATE: 80 BPM | WEIGHT: 177 LBS | RESPIRATION RATE: 16 BRPM | TEMPERATURE: 97.2 F | DIASTOLIC BLOOD PRESSURE: 76 MMHG | HEIGHT: 70 IN | BODY MASS INDEX: 25.34 KG/M2

## 2020-11-03 PROBLEM — M48.062 LUMBAR STENOSIS WITH NEUROGENIC CLAUDICATION: Status: ACTIVE | Noted: 2019-11-08

## 2020-11-03 LAB
CHP ED QC CHECK: ABNORMAL
GLUCOSE BLD-MCNC: 128 MG/DL
HBA1C MFR BLD: 6.5 %

## 2020-11-03 PROCEDURE — 4040F PNEUMOC VAC/ADMIN/RCVD: CPT | Performed by: FAMILY MEDICINE

## 2020-11-03 PROCEDURE — 1123F ACP DISCUSS/DSCN MKR DOCD: CPT | Performed by: FAMILY MEDICINE

## 2020-11-03 PROCEDURE — G0439 PPPS, SUBSEQ VISIT: HCPCS | Performed by: FAMILY MEDICINE

## 2020-11-03 PROCEDURE — 99213 OFFICE O/P EST LOW 20 MIN: CPT | Performed by: FAMILY MEDICINE

## 2020-11-03 PROCEDURE — 83036 HEMOGLOBIN GLYCOSYLATED A1C: CPT | Performed by: FAMILY MEDICINE

## 2020-11-03 PROCEDURE — 82962 GLUCOSE BLOOD TEST: CPT | Performed by: FAMILY MEDICINE

## 2020-11-03 RX ORDER — POLYETHYLENE GLYCOL 3350 17 G/17G
17 POWDER, FOR SOLUTION ORAL DAILY
Qty: 510 G | Refills: 2 | Status: SHIPPED | OUTPATIENT
Start: 2020-11-03

## 2020-11-03 RX ORDER — BLOOD SUGAR DIAGNOSTIC
STRIP MISCELLANEOUS
Qty: 300 EACH | Refills: 3 | Status: SHIPPED | OUTPATIENT
Start: 2020-11-03 | End: 2020-11-05 | Stop reason: SDUPTHER

## 2020-11-03 ASSESSMENT — ENCOUNTER SYMPTOMS
ABDOMINAL PAIN: 0
BLOOD IN STOOL: 0
BACK PAIN: 1
TROUBLE SWALLOWING: 0
SHORTNESS OF BREATH: 0
CHEST TIGHTNESS: 0
NAUSEA: 0
SORE THROAT: 0
CONSTIPATION: 0
EYE PAIN: 0
COUGH: 0

## 2020-11-03 ASSESSMENT — PATIENT HEALTH QUESTIONNAIRE - PHQ9
SUM OF ALL RESPONSES TO PHQ QUESTIONS 1-9: 0
1. LITTLE INTEREST OR PLEASURE IN DOING THINGS: 0
SUM OF ALL RESPONSES TO PHQ9 QUESTIONS 1 & 2: 0
2. FEELING DOWN, DEPRESSED OR HOPELESS: 0

## 2020-11-03 ASSESSMENT — LIFESTYLE VARIABLES: HOW OFTEN DO YOU HAVE A DRINK CONTAINING ALCOHOL: 0

## 2020-11-03 NOTE — PROGRESS NOTES
Medicare Annual Wellness Visit  Name: Casandra Van Date: 11/3/2020   MRN: [id number] Sex: Male   Age: 81 y.o. Ethnicity: Non-/Non    : 1935 Race: Derek Lugo is here for Medicare AWV    Screenings for behavioral, psychosocial and functional/safety risks, and cognitive dysfunction are all negative except as indicated below. These results, as well as other patient data from the [address] [facility] [address] form, are documented in Flowsheets linked to this Encounter. No Known Allergies    Prior to Visit Medications    Medication Sig Taking? Authorizing Provider   glimepiride (AMARYL) 4 MG tablet TAKE 1/2 TABLET TWICE A DAY Yes Joya Morales MD   metFORMIN (GLUCOPHAGE) 500 MG tablet TAKE 1 TABLET EVERY MORNINGAND TAKE 2 TABLETS EVERY   EVENING Yes Joya Morales MD   simvastatin (ZOCOR) 10 MG tablet Take 1 tablet by mouth nightly New lower dose Yes Joya Morales MD   tamsulosin (FLOMAX) 0.4 MG capsule take 1 capsule by mouth once daily Yes Joya Morales MD   Famotidine (PEPCID PO) Take by mouth as needed Yes Historical Provider, MD   metoprolol tartrate (LOPRESSOR) 25 MG tablet TAKE 1 TABLET TWICE A DAY  Patient taking differently: TAKE 1/2 TABLET TWICE A DAY Yes Joya Morales MD   levothyroxine (SYNTHROID) 25 MCG tablet TAKE 1 TABLET DAILY Yes Joya Morales MD   acetaminophen (TYLENOL) 500 MG tablet Take 500 mg by mouth every 6 hours as needed for Pain Yes Historical Provider, MD   nitroGLYCERIN (NITROSTAT) 0.4 MG SL tablet Place 1 tablet under the tongue every 5 minutes as needed for Chest pain Yes Ambrose Mathis MD   blood glucose test strips (ACCU-CHEK RUPA) strip Use to check blood sugar daily, Dx: E11.9 Yes Joya Morales MD   polyethylene glycol (MIRALAX) powder Take 17 g by mouth daily Yes Joya Morales MD   Ascorbic Acid (VITAMIN C) 1000 MG tablet Take 1,000 mg by mouth daily.  Yes Historical Provider, MD   aspirin EC 81 MG EC tablet Take 81 mg by mouth daily. Yes Historical Provider, MD       Past Medical History:   Diagnosis Date    CAD (coronary artery disease)     Colon polyp     Hyperlipidemia     Hypertension     S/P CABG (coronary artery bypass graft)     Type II or unspecified type diabetes mellitus without mention of complication, not stated as uncontrolled        Past Surgical History:   Procedure Laterality Date   [place] and 1995    left and  right     COLONOSCOPY  2009    dr. Alton Smith and due 2014    CORONARY ARTERY BYPASS GRAFT  2005   [address]  11/2019      L4  and  L5   kashmen   at   [facility] Left 06/03/2019    Dr Yamileth Perrin @ [facility]        Family History   Problem Relation Age of Onset    Heart Disease Father     Heart Disease Mother        CareTeam (Including outside providers/suppliers regularly involved in providing care):   Patient Care Team:  Veronica Royal MD as PCP - General  Veronica Royal MD as PCP - [facility] Deidra Blankenship Provider    Wt Readings from Last 3 Encounters:   11/03/20 177 lb (80.3 kg)   07/14/20 174 lb 9.6 oz (79.2 kg)   07/13/20 173 lb 4 oz (78.6 kg)     Vitals:    11/03/20 1412   BP: (!) 142/76   Site: Right Upper Arm   Position: Sitting   Cuff Size: Medium Adult   Pulse: 80   Resp: 16   Temp: 97.2 °F (36.2 °C)   TempSrc: Oral   Weight: 177 lb (80.3 kg)   Height: 5' 10\" (1.778 m)     Body mass index is 25.4 kg/m². Based upon direct observation of the patient, evaluation of cognition reveals recent and remote memory intact. Patient's complete Health Risk Assessment and screening values have been reviewed and are found in Flowsheets. The following problems were reviewed today and where indicated follow up appointments were made and/or referrals ordered.     Positive Risk Factor Screenings with Interventions:     General Health and ACP:  General  In general, how would you say your health is?: POCT Glucose  -     POCT glycosylated hemoglobin (Hb A1C)    Type 2 diabetes mellitus with complication, without long-term current use of insulin (HCC)    Constipation, unspecified constipation type              Hearing  Loss  Noted  Ant   To  See bárbara hyman  11/3/2020     Jose Luis Joyner (:  1935) is a 81 y.o. male, here for evaluation of the following medical concerns:    HPI    Shoulders  With pain       htn  Stable      ashd stable       Niddm  Stable      lipids  Stable       gerd  Stable               Review of Systems   Constitutional: Negative for fatigue and fever. HENT: Negative for congestion, ear discharge, ear pain, postnasal drip, sore throat and trouble swallowing. Eyes: Negative for pain. Respiratory: Negative for cough, chest tightness and shortness of breath. Cardiovascular: Negative for chest pain, palpitations and leg swelling. Gastrointestinal: Negative for abdominal pain, blood in stool, constipation and nausea. Genitourinary: Negative for difficulty urinating, frequency and urgency. Musculoskeletal: Positive for back pain. Negative for arthralgias, joint swelling and neck stiffness. Shoulder pain and  With  Therapy  And ow  Still same    Skin: Negative for rash. Neurological: Negative for dizziness, weakness and headaches. Hematological: Negative for adenopathy. Does not bruise/bleed easily. Psychiatric/Behavioral: Negative for behavioral problems, dysphoric mood and sleep disturbance. Prior to Visit Medications    Medication Sig Taking?  Authorizing Provider   glimepiride (AMARYL) 4 MG tablet TAKE 1/2 TABLET TWICE A DAY Yes Devi Castaneda MD   metFORMIN (GLUCOPHAGE) 500 MG tablet TAKE 1 TABLET EVERY MORNINGAND TAKE 2 TABLETS EVERY   EVENING Yes Devi Castaneda MD   simvastatin (ZOCOR) 10 MG tablet Take 1 tablet by mouth nightly New lower dose Yes Devi Castaneda MD   tamsulosin (FLOMAX) 0.4 MG capsule take 1 capsule by mouth once daily Yes Leonardo Willis MD   Famotidine (PEPCID PO) Take by mouth as needed Yes Historical Provider, MD   metoprolol tartrate (LOPRESSOR) 25 MG tablet TAKE 1 TABLET TWICE A DAY  Patient taking differently: TAKE 1/2 TABLET TWICE A DAY Yes Leonardo Willis MD   levothyroxine (SYNTHROID) 25 MCG tablet TAKE 1 TABLET DAILY Yes Leonardo Willis MD   acetaminophen (TYLENOL) 500 MG tablet Take 500 mg by mouth every 6 hours as needed for Pain Yes Historical Provider, MD   nitroGLYCERIN (NITROSTAT) 0.4 MG SL tablet Place 1 tablet under the tongue every 5 minutes as needed for Chest pain Yes Deloris Durant MD   blood glucose test strips (ACCU-CHEK RUPA) strip Use to check blood sugar daily, Dx: E11.9 Yes Leonardo Willis MD   polyethylene glycol (MIRALAX) powder Take 17 g by mouth daily Yes Leonardo Willis MD   Ascorbic Acid (VITAMIN C) 1000 MG tablet Take 1,000 mg by mouth daily. Yes Historical Provider, MD   aspirin EC 81 MG EC tablet Take 81 mg by mouth daily. Yes Historical Provider, MD        Social History     Tobacco Use    Smoking status: Never Smoker    Smokeless tobacco: Never Used   Substance Use Topics    Alcohol use: Yes     Alcohol/week: 0.0 standard drinks     Comment: very rare        Vitals:    11/03/20 1412   BP: (!) 142/76   Site: Right Upper Arm   Position: Sitting   Cuff Size: Medium Adult   Pulse: 80   Resp: 16   Temp: 97.2 °F (36.2 °C)   TempSrc: Oral   Weight: 177 lb (80.3 kg)   Height: 5' 10\" (1.778 m)     Estimated body mass index is 25.4 kg/m² as calculated from the following:    Height as of this encounter: 5' 10\" (1.778 m). Weight as of this encounter: 177 lb (80.3 kg). Physical Exam  Vitals signs and nursing note reviewed. Constitutional:       Appearance: He is well-developed. HENT:      Head: Normocephalic and atraumatic.       Right Ear: External ear normal.      Left Ear: External ear normal.      Nose: Nose normal.   Eyes:      Conjunctiva/sclera: Conjunctivae normal.      Pupils: Pupils are equal, round, and reactive to light. Comments: Fundi nl   Neck:      Musculoskeletal: Normal range of motion and neck supple. Thyroid: No thyromegaly. Cardiovascular:      Rate and Rhythm: Normal rate and regular rhythm. Heart sounds: Normal heart sounds. Pulmonary:      Effort: Pulmonary effort is normal.      Breath sounds: Normal breath sounds. No wheezing or rales. Abdominal:      General: Bowel sounds are normal.      Palpations: Abdomen is soft. There is no mass. Tenderness: There is no abdominal tenderness. Musculoskeletal: Normal range of motion. Lymphadenopathy:      Cervical: No cervical adenopathy. Skin:     General: Skin is warm and dry. Findings: No rash. Neurological:      Mental Status: He is alert and oriented to person, place, and time. Cranial Nerves: No cranial nerve deficit. Deep Tendon Reflexes: Reflexes are normal and symmetric. ASSESSMENT/PLAN:       Diagnosis Orders   1. Type 2 diabetes mellitus without complication, without long-term current use of insulin ([facility])  POCT Glucose    POCT glycosylated hemoglobin (Hb A1C)   2. Constipation, unspecified constipation type  polyethylene glycol (MIRALAX) 17 GM/SCOOP powder   3. Essential hypertension     4. Coronary artery disease involving native coronary artery of native heart without angina pectoris     5. Hypothyroidism due to acquired atrophy of thyroid     6. Mixed hyperlipidemia       No follow-ups on file.   PLAn         Current Outpatient Medications   Medication Sig Dispense Refill    glimepiride (AMARYL) 4 MG tablet TAKE 1/2 TABLET TWICE A DAY 90 tablet 1    metFORMIN (GLUCOPHAGE) 500 MG tablet TAKE 1 TABLET EVERY MORNINGAND TAKE 2 TABLETS EVERY   EVENING 270 tablet 1    simvastatin (ZOCOR) 10 MG tablet Take 1 tablet by mouth nightly New lower dose 90 tablet 1    tamsulosin (FLOMAX) 0.4 MG capsule take 1 capsule by mouth once daily 90

## 2020-11-04 ENCOUNTER — TELEPHONE (OUTPATIENT)
Dept: FAMILY MEDICINE CLINIC | Age: 85
End: 2020-11-04

## 2020-11-04 NOTE — TELEPHONE ENCOUNTER
Linda Bardales called stating that the Test strips need to be sent to [facility] on [address] will not fill it.

## 2020-11-05 RX ORDER — BLOOD SUGAR DIAGNOSTIC
STRIP MISCELLANEOUS
Qty: 300 EACH | Refills: 3 | Status: SHIPPED | OUTPATIENT
Start: 2020-11-05 | End: 2021-02-04

## 2020-11-11 ENCOUNTER — NURSE ONLY (OUTPATIENT)
Dept: FAMILY MEDICINE CLINIC | Age: 85
End: 2020-11-11

## 2020-11-11 NOTE — PROGRESS NOTES
Pt in for a BP check, stated he was high last night, 198/102. Today BP was 112/78. Right Arm, sitting. Advised him to continue to watch the blood pressures and come back Friday if they were still high.

## 2020-11-13 ENCOUNTER — NURSE ONLY (OUTPATIENT)
Dept: FAMILY MEDICINE CLINIC | Age: 85
End: 2020-11-13

## 2020-11-13 VITALS — DIASTOLIC BLOOD PRESSURE: 52 MMHG | SYSTOLIC BLOOD PRESSURE: 110 MMHG

## 2020-11-13 NOTE — PROGRESS NOTES
Pt in for BP check, states \"my machine at home is all over the place\". States some high BP and some low BP. Todays reading is 114/52 Right Arm Sitting Medium adult cuff. Told pt to sit for 5 mins and would recheck. The recheck was 110/52 Right Arm Sitting Medium Adult cuff.

## 2020-12-22 NOTE — TELEPHONE ENCOUNTER
Date of last visit:  11/3/2020  Date of next visit:  2/4/2021    Requested Prescriptions     Pending Prescriptions Disp Refills    levothyroxine (SYNTHROID) 25 MCG tablet 90 tablet 1     Sig: TAKE 1 TABLET DAILY

## 2020-12-22 NOTE — TELEPHONE ENCOUNTER
Pt's wife called requesting 90 day Rx for Levothyroxine 25 mcg one tablet daily    [facility]

## 2020-12-23 RX ORDER — LEVOTHYROXINE SODIUM 0.03 MG/1
TABLET ORAL
Qty: 90 TABLET | Refills: 1 | Status: SHIPPED | OUTPATIENT
Start: 2020-12-23 | End: 2021-05-11 | Stop reason: SDUPTHER

## 2021-02-04 ENCOUNTER — OFFICE VISIT (OUTPATIENT)
Dept: FAMILY MEDICINE CLINIC | Age: 86
End: 2021-02-04

## 2021-02-04 VITALS
SYSTOLIC BLOOD PRESSURE: 112 MMHG | TEMPERATURE: 96.8 F | BODY MASS INDEX: 25.62 KG/M2 | DIASTOLIC BLOOD PRESSURE: 66 MMHG | RESPIRATION RATE: 16 BRPM | HEART RATE: 84 BPM | WEIGHT: 179 LBS | HEIGHT: 70 IN

## 2021-02-04 DIAGNOSIS — E11.9 TYPE 2 DIABETES MELLITUS WITHOUT COMPLICATION, WITHOUT LONG-TERM CURRENT USE OF INSULIN (HCC): Primary | ICD-10-CM

## 2021-02-04 DIAGNOSIS — I10 ESSENTIAL HYPERTENSION: ICD-10-CM

## 2021-02-04 DIAGNOSIS — M48.062 LUMBAR STENOSIS WITH NEUROGENIC CLAUDICATION: ICD-10-CM

## 2021-02-04 DIAGNOSIS — N40.0 BENIGN PROSTATIC HYPERPLASIA WITHOUT LOWER URINARY TRACT SYMPTOMS: ICD-10-CM

## 2021-02-04 DIAGNOSIS — I25.10 CORONARY ARTERY DISEASE INVOLVING NATIVE CORONARY ARTERY OF NATIVE HEART WITHOUT ANGINA PECTORIS: ICD-10-CM

## 2021-02-04 DIAGNOSIS — E03.4 HYPOTHYROIDISM DUE TO ACQUIRED ATROPHY OF THYROID: ICD-10-CM

## 2021-02-04 DIAGNOSIS — E78.2 MIXED HYPERLIPIDEMIA: ICD-10-CM

## 2021-02-04 LAB
CHP ED QC CHECK: ABNORMAL
GLUCOSE BLD-MCNC: 157 MG/DL
HBA1C MFR BLD: 6.5 %

## 2021-02-04 PROCEDURE — 1123F ACP DISCUSS/DSCN MKR DOCD: CPT | Performed by: FAMILY MEDICINE

## 2021-02-04 PROCEDURE — G8427 DOCREV CUR MEDS BY ELIG CLIN: HCPCS | Performed by: FAMILY MEDICINE

## 2021-02-04 PROCEDURE — 82962 GLUCOSE BLOOD TEST: CPT | Performed by: FAMILY MEDICINE

## 2021-02-04 PROCEDURE — 99213 OFFICE O/P EST LOW 20 MIN: CPT | Performed by: FAMILY MEDICINE

## 2021-02-04 PROCEDURE — G8417 CALC BMI ABV UP PARAM F/U: HCPCS | Performed by: FAMILY MEDICINE

## 2021-02-04 PROCEDURE — 1036F TOBACCO NON-USER: CPT | Performed by: FAMILY MEDICINE

## 2021-02-04 PROCEDURE — 4040F PNEUMOC VAC/ADMIN/RCVD: CPT | Performed by: FAMILY MEDICINE

## 2021-02-04 PROCEDURE — 83036 HEMOGLOBIN GLYCOSYLATED A1C: CPT | Performed by: FAMILY MEDICINE

## 2021-02-04 RX ORDER — BLOOD-GLUCOSE METER
EACH MISCELLANEOUS
Qty: 1 DEVICE | Refills: 0 | Status: SHIPPED | OUTPATIENT
Start: 2021-02-04 | End: 2021-08-18

## 2021-02-04 RX ORDER — CALCIUM CITRATE/VITAMIN D3 200MG-6.25
TABLET ORAL
Qty: 300 EACH | Refills: 3 | Status: SHIPPED | OUTPATIENT
Start: 2021-02-04

## 2021-02-04 ASSESSMENT — ENCOUNTER SYMPTOMS
ORTHOPNEA: 0
CONSTIPATION: 0
EYE PAIN: 0
BACK PAIN: 0
TROUBLE SWALLOWING: 0
COUGH: 0
NAUSEA: 0
SORE THROAT: 0
ABDOMINAL PAIN: 0
CHEST TIGHTNESS: 0
BLOOD IN STOOL: 0
SHORTNESS OF BREATH: 0

## 2021-02-04 ASSESSMENT — PATIENT HEALTH QUESTIONNAIRE - PHQ9
SUM OF ALL RESPONSES TO PHQ QUESTIONS 1-9: 0
1. LITTLE INTEREST OR PLEASURE IN DOING THINGS: 0
SUM OF ALL RESPONSES TO PHQ QUESTIONS 1-9: 0
SUM OF ALL RESPONSES TO PHQ QUESTIONS 1-9: 0

## 2021-02-04 NOTE — PROGRESS NOTES
Subjective:      Patient ID: Vasquez Haji is a 81 y.o. male. Ashd   Stable       Right  Knee  With  Arthritis   And  Left  Total knee       Back  Stable   Stable       bph   Stable       Hypothyroid  Stable          Hypertension  This is a chronic problem. The current episode started more than 1 year ago. The problem has been resolved since onset. The problem is controlled. Pertinent negatives include no chest pain, headaches, orthopnea, palpitations, peripheral edema or shortness of breath. The current treatment provides significant improvement. There are no compliance problems. Diabetes  He presents for his follow-up diabetic visit. He has type 2 diabetes mellitus. His disease course has been stable. There are no hypoglycemic associated symptoms. Pertinent negatives for hypoglycemia include no dizziness or headaches. There are no diabetic associated symptoms. Pertinent negatives for diabetes include no chest pain, no fatigue and no weakness. There are no hypoglycemic complications. Symptoms are stable. Current diabetic treatment includes oral agent (dual therapy). He is compliant with treatment all of the time. His weight is stable. He is following a diabetic diet. His breakfast blood glucose is taken between 6-7 am.     Past Medical History:   Diagnosis Date    CAD (coronary artery disease)     Colon polyp     Hyperlipidemia     Hypertension     S/P CABG (coronary artery bypass graft)     Type II or unspecified type diabetes mellitus without mention of complication, not stated as uncontrolled        Review of Systems   Constitutional: Negative for fatigue and fever. HENT: Negative for congestion, ear pain, postnasal drip, sore throat and trouble swallowing. Eyes: Negative for pain. Respiratory: Negative for cough, chest tightness and shortness of breath. Cardiovascular: Negative for chest pain, palpitations, orthopnea and leg swelling. Gastrointestinal: Negative for abdominal pain, blood in stool, constipation and nausea. Genitourinary: Negative for difficulty urinating, frequency and urgency. Musculoskeletal: Negative for arthralgias, back pain, joint swelling and neck stiffness. Skin: Negative for rash. Neurological: Negative for dizziness, weakness and headaches. Hematological: Negative for adenopathy. Does not bruise/bleed easily. Psychiatric/Behavioral: Negative for behavioral problems, dysphoric mood and sleep disturbance. /66 (Site: Right Upper Arm, Position: Sitting, Cuff Size: Medium Adult)   Pulse 84   Temp 96.8 °F (36 °C) (Oral)   Resp 16   Ht 5' 10\" (1.778 m)   Wt 179 lb (81.2 kg)   BMI 25.68 kg/m²   Objective:   Physical Exam  Vitals signs and nursing note reviewed. Constitutional:       Appearance: He is well-developed. HENT:      Head: Normocephalic and atraumatic. Right Ear: External ear normal.      Left Ear: External ear normal.      Nose: Nose normal.   Eyes:      Conjunctiva/sclera: Conjunctivae normal.      Pupils: Pupils are equal, round, and reactive to light. Comments: Fundi nl   Neck:      Musculoskeletal: Normal range of motion and neck supple. Thyroid: No thyromegaly. Cardiovascular:      Rate and Rhythm: Normal rate and regular rhythm. Heart sounds: Normal heart sounds. Pulmonary:      Effort: Pulmonary effort is normal.      Breath sounds: Normal breath sounds. No wheezing or rales. Abdominal:      General: Bowel sounds are normal.      Palpations: Abdomen is soft. There is no mass. Tenderness: There is no abdominal tenderness. Musculoskeletal: Normal range of motion. Comments:   Diffuse  arthritis   Lymphadenopathy:      Cervical: No cervical adenopathy. Skin:     General: Skin is warm and dry. Findings: No rash. Neurological:      Mental Status: He is alert and oriented to person, place, and time. Cranial Nerves: No cranial nerve deficit. Deep Tendon Reflexes: Reflexes are normal and symmetric. Assessment:       Diagnosis Orders   1. Type 2 diabetes mellitus without complication, without long-term current use of insulin ([facility])  POCT Glucose    POCT glycosylated hemoglobin (Hb A1C)   2. Lumbar stenosis with neurogenic claudication     3. Hypothyroidism due to acquired atrophy of thyroid     4. Essential hypertension     5. Mixed hyperlipidemia     6. Coronary artery disease involving native coronary artery of native heart without angina pectoris           Plan:      Current Outpatient Medications   Medication Sig Dispense Refill    Blood Glucose Monitoring Suppl (TRUE METRIX METER) CRESCENCIO Use to check blood sugar daily, Dx: E11.9 1 Device 0    blood glucose test strips (TRUE METRIX BLOOD GLUCOSE TEST) strip Use to check blood sugar daily, Dx: E11.9 300 each 3    levothyroxine (SYNTHROID) 25 MCG tablet TAKE 1 TABLET DAILY 90 tablet 1    metFORMIN (GLUCOPHAGE) 500 MG tablet TAKE 1 TABLET EVERY MORNINGAND TAKE 2 TABLETS EVERY   EVENING 270 tablet 1    polyethylene glycol (MIRALAX) 17 GM/SCOOP powder Take 17 g by mouth daily 510 g 2    glimepiride (AMARYL) 4 MG tablet TAKE 1/2 TABLET TWICE A DAY 90 tablet 1    simvastatin (ZOCOR) 10 MG tablet Take 1 tablet by mouth nightly New lower dose 90 tablet 1    tamsulosin (FLOMAX) 0.4 MG capsule take 1 capsule by mouth once daily 90 capsule 1    Famotidine (PEPCID PO) Take by mouth as needed      metoprolol tartrate (LOPRESSOR) 25 MG tablet TAKE 1 TABLET TWICE A DAY (Patient taking differently: TAKE 1/2 TABLET TWICE A DAY) 180 tablet 1    acetaminophen (TYLENOL) 500 MG tablet Take 500 mg by mouth every 6 hours as needed for Pain      nitroGLYCERIN (NITROSTAT) 0.4 MG SL tablet Place 1 tablet under the tongue every 5 minutes as needed for Chest pain 25 tablet 5    Ascorbic Acid (VITAMIN C) 1000 MG tablet Take 1,000 mg by mouth daily.  aspirin EC 81 MG EC tablet Take 81 mg by mouth daily. No current facility-administered medications for this visit. Orders Placed This Encounter   Procedures    POCT Glucose    POCT glycosylated hemoglobin (Hb A1C)     Results for orders placed or performed in visit on 02/04/21   POCT Glucose   Result Value Ref Range    Glucose 157 (A) mg/dL    QC OK? POCT glycosylated hemoglobin (Hb A1C)   Result Value Ref Range    Hemoglobin A1C 6.5 (A) %     Orders Placed This Encounter   Procedures    T4, Free     Standing Status:   Future     Standing Expiration Date:   2/4/2022    TSH without Reflex     Standing Status:   Future     Standing Expiration Date:   2/4/2022    Lipid Panel     Standing Status:   Future     Standing Expiration Date:   2/4/2022     Order Specific Question:   Is Patient Fasting?/# of Hours     Answer:   YES 12 HOURS    Comprehensive Metabolic Panel     Standing Status:   Future     Standing Expiration Date:   2/4/2022    CBC Auto Differential     Standing Status:   Future     Standing Expiration Date:   2/4/2022    PSA, Prostatic Specific Antigen     Standing Status:   Future     Standing Expiration Date:   2/4/2022    POCT Glucose    POCT glycosylated hemoglobin (Hb A1C)     Ravi received counseling on the following healthy behaviors: nutrition and exercise    Patient given educational materials on Diabetes and Hyperlipidemia    I have instructed Thaddeus Wen to complete a self tracking handout on Blood Sugars  and Blood Pressures  and instructed them to bring it with them to his next appointment. Discussed use, benefit, and side effects of prescribed medications. Barriers to medication compliance addressed. All patient questions answered. Pt voiced understanding.            See in  3  mths     Connie Lizama MD

## 2021-02-25 DIAGNOSIS — E11.8 TYPE 2 DIABETES MELLITUS WITH COMPLICATION, WITHOUT LONG-TERM CURRENT USE OF INSULIN (HCC): ICD-10-CM

## 2021-02-25 DIAGNOSIS — I10 ESSENTIAL HYPERTENSION: ICD-10-CM

## 2021-02-25 NOTE — TELEPHONE ENCOUNTER
Date of last visit:  2/4/2021  Date of next visit:  5/11/2021    Requested Prescriptions     Pending Prescriptions Disp Refills    glimepiride (AMARYL) 4 MG tablet [Pharmacy Med Name: GLIMEPIRIDE  TAB 4MG] 90 tablet 1     Sig: TAKE 1/2 TABLET TWICE A DAY

## 2021-02-25 NOTE — TELEPHONE ENCOUNTER
Date of last visit:  2/4/2021  Date of next visit:  5/11/2021    Requested Prescriptions     Pending Prescriptions Disp Refills    metoprolol tartrate (LOPRESSOR) 25 MG tablet [Pharmacy Med Name: METOPROLOL TAB TAR 25MG] 180 tablet 1     Sig: TAKE 1 TABLET TWICE A DAY

## 2021-02-26 RX ORDER — GLIMEPIRIDE 4 MG/1
TABLET ORAL
Qty: 90 TABLET | Refills: 1 | Status: SHIPPED | OUTPATIENT
Start: 2021-02-26 | End: 2021-08-18

## 2021-03-10 DIAGNOSIS — E78.00 PURE HYPERCHOLESTEROLEMIA: ICD-10-CM

## 2021-03-10 DIAGNOSIS — N40.1 BENIGN NON-NODULAR PROSTATIC HYPERPLASIA WITH LOWER URINARY TRACT SYMPTOMS: ICD-10-CM

## 2021-03-10 NOTE — TELEPHONE ENCOUNTER
Date of last visit:  2/4/2021  Date of next visit:  5/11/2021    Requested Prescriptions     Pending Prescriptions Disp Refills    tamsulosin (FLOMAX) 0.4 MG capsule [Pharmacy Med Name: TAMSULOSIN CAP 0.4MG] 90 capsule 1     Sig: TAKE 1 CAPSULE ONCE DAILY    simvastatin (ZOCOR) 10 MG tablet [Pharmacy Med Name: SIMVASTATIN  TAB 10MG] 90 tablet 1     Sig: TAKE 1 TABLET NIGHTLY (NEW LOWER DOSE)

## 2021-03-12 RX ORDER — TAMSULOSIN HYDROCHLORIDE 0.4 MG/1
CAPSULE ORAL
Qty: 90 CAPSULE | Refills: 1 | Status: SHIPPED | OUTPATIENT
Start: 2021-03-12 | End: 2021-05-11 | Stop reason: ALTCHOICE

## 2021-03-12 RX ORDER — SIMVASTATIN 10 MG
TABLET ORAL
Qty: 90 TABLET | Refills: 1 | Status: SHIPPED | OUTPATIENT
Start: 2021-03-12 | End: 2021-05-11 | Stop reason: SDUPTHER

## 2021-03-23 ENCOUNTER — OFFICE VISIT (OUTPATIENT)
Dept: INTERNAL MEDICINE CLINIC | Age: 86
End: 2021-03-23
Payer: MEDICARE

## 2021-03-23 VITALS
DIASTOLIC BLOOD PRESSURE: 90 MMHG | WEIGHT: 180.3 LBS | TEMPERATURE: 97.3 F | BODY MASS INDEX: 25.81 KG/M2 | SYSTOLIC BLOOD PRESSURE: 162 MMHG | HEART RATE: 74 BPM | HEIGHT: 70 IN

## 2021-03-23 DIAGNOSIS — E11.9 TYPE 2 DIABETES MELLITUS WITHOUT COMPLICATION, WITHOUT LONG-TERM CURRENT USE OF INSULIN (HCC): ICD-10-CM

## 2021-03-23 DIAGNOSIS — I10 ESSENTIAL HYPERTENSION: Primary | ICD-10-CM

## 2021-03-23 DIAGNOSIS — Z95.1 S/P CABG (CORONARY ARTERY BYPASS GRAFT): ICD-10-CM

## 2021-03-23 PROCEDURE — G8417 CALC BMI ABV UP PARAM F/U: HCPCS | Performed by: INTERNAL MEDICINE

## 2021-03-23 PROCEDURE — 1036F TOBACCO NON-USER: CPT | Performed by: INTERNAL MEDICINE

## 2021-03-23 PROCEDURE — G8427 DOCREV CUR MEDS BY ELIG CLIN: HCPCS | Performed by: INTERNAL MEDICINE

## 2021-03-23 PROCEDURE — 99212 OFFICE O/P EST SF 10 MIN: CPT | Performed by: INTERNAL MEDICINE

## 2021-03-23 PROCEDURE — 1123F ACP DISCUSS/DSCN MKR DOCD: CPT | Performed by: INTERNAL MEDICINE

## 2021-03-23 PROCEDURE — G8484 FLU IMMUNIZE NO ADMIN: HCPCS | Performed by: INTERNAL MEDICINE

## 2021-03-23 PROCEDURE — 4040F PNEUMOC VAC/ADMIN/RCVD: CPT | Performed by: INTERNAL MEDICINE

## 2021-03-23 RX ORDER — GLUCOSAM/CHON-MSM1/C/MANG/BOSW 500-416.6
TABLET ORAL
COMMUNITY
Start: 2021-02-24

## 2021-03-23 NOTE — PROGRESS NOTES
Maura Toney (:  1935) is a 81 y.o. male,Established patient, here for evaluation of the following chief complaint(s):  Coronary Artery Disease (8 month follow up), Diabetes (PCP follows), and Hypertension      ASSESSMENT/PLAN:  1. Essential hypertension- he takes at home off and on; gets 160-170 but at times in 80s sitting with sxs dizziness; on metoprolol. Hesitate  To boost rx due to hypotensive episodes; encouraged to increase fluids. To get labs 5.21  2. Type 2 diabetes mellitus without complication, without long-term current use of insulin ([facility] [address]- follows with pcp- gets A1C    3. S/P CABG (coronary artery bypass graft)- no chest pain- has chronic dyspnea      Return in about 6 months (around 2021). SUBJECTIVE/OBJECTIVE:  HPI pt with cad, cabg, dm, hbp; no increase in sxs. Has bph, on flomax; works fairly well. Gets dizzy spells off and on. Review of Systems  Twelve point ROS completed and found to be negative except as noted above. Physical Exam  General appearance - chronically ill appearing    Mental Status - alert, oriented to person, place, and time      Neck - supple, no significant adenopathy, carotids upstroke normal bilaterally, no bruits         Chest - few crackles right base, clear with deep breath     Heart - normal rate, regular rhythm, normal S1, S2, no murmurs, rubs, clicks or gallops     Abdomen - soft, nontender, nondistended, no masses or organomegaly       Neurological - alert, oriented, normal speech, no focal findings or movement disorder noted     Musculoskeletal - no muscular tenderness noted     Extremities - pedal edema tr +     Skin - normal coloration and turgor, no rashes, no suspicious skin lesions noted            An electronic signature was used to authenticate this note.     --Christine Rudd MD

## 2021-05-06 ENCOUNTER — NURSE ONLY (OUTPATIENT)
Dept: LAB | Age: 86
End: 2021-05-06

## 2021-05-06 DIAGNOSIS — I10 ESSENTIAL HYPERTENSION: ICD-10-CM

## 2021-05-06 DIAGNOSIS — E78.2 MIXED HYPERLIPIDEMIA: ICD-10-CM

## 2021-05-06 DIAGNOSIS — N40.0 BENIGN PROSTATIC HYPERPLASIA WITHOUT LOWER URINARY TRACT SYMPTOMS: ICD-10-CM

## 2021-05-06 DIAGNOSIS — E03.4 HYPOTHYROIDISM DUE TO ACQUIRED ATROPHY OF THYROID: ICD-10-CM

## 2021-05-06 LAB
ALBUMIN SERPL-MCNC: 4.7 G/DL (ref 3.5–5.1)
ALP BLD-CCNC: 79 U/L (ref 38–126)
ALT SERPL-CCNC: 12 U/L (ref 11–66)
ANION GAP SERPL CALCULATED.3IONS-SCNC: 12 MEQ/L (ref 8–16)
AST SERPL-CCNC: 14 U/L (ref 5–40)
BASOPHILS # BLD: 0.6 %
BASOPHILS ABSOLUTE: 0 THOU/MM3 (ref 0–0.1)
BILIRUB SERPL-MCNC: 0.9 MG/DL (ref 0.3–1.2)
BUN BLDV-MCNC: 39 MG/DL (ref 7–22)
CALCIUM SERPL-MCNC: 9.7 MG/DL (ref 8.5–10.5)
CHLORIDE BLD-SCNC: 108 MEQ/L (ref 98–111)
CHOLESTEROL, TOTAL: 124 MG/DL (ref 100–199)
CO2: 24 MEQ/L (ref 23–33)
CREAT SERPL-MCNC: 1.2 MG/DL (ref 0.4–1.2)
EOSINOPHIL # BLD: 5.7 %
EOSINOPHILS ABSOLUTE: 0.4 THOU/MM3 (ref 0–0.4)
ERYTHROCYTE [DISTWIDTH] IN BLOOD BY AUTOMATED COUNT: 12 % (ref 11.5–14.5)
ERYTHROCYTE [DISTWIDTH] IN BLOOD BY AUTOMATED COUNT: 43.4 FL (ref 35–45)
GFR SERPL CREATININE-BSD FRML MDRD: 58 ML/MIN/1.73M2
GLUCOSE BLD-MCNC: 104 MG/DL (ref 70–108)
HCT VFR BLD CALC: 44.9 % (ref 42–52)
HDLC SERPL-MCNC: 46 MG/DL
HEMOGLOBIN: 13.5 GM/DL (ref 14–18)
IMMATURE GRANS (ABS): 0.03 THOU/MM3 (ref 0–0.07)
IMMATURE GRANULOCYTES: 0.4 %
LDL CHOLESTEROL CALCULATED: 58 MG/DL
LYMPHOCYTES # BLD: 20.5 %
LYMPHOCYTES ABSOLUTE: 1.5 THOU/MM3 (ref 1–4.8)
MCH RBC QN AUTO: 29.5 PG (ref 26–33)
MCHC RBC AUTO-ENTMCNC: 30.1 GM/DL (ref 32.2–35.5)
MCV RBC AUTO: 98 FL (ref 80–94)
MONOCYTES # BLD: 9.3 %
MONOCYTES ABSOLUTE: 0.7 THOU/MM3 (ref 0.4–1.3)
NUCLEATED RED BLOOD CELLS: 0 /100 WBC
PLATELET # BLD: 154 THOU/MM3 (ref 130–400)
PMV BLD AUTO: 9.9 FL (ref 9.4–12.4)
POTASSIUM SERPL-SCNC: 4.8 MEQ/L (ref 3.5–5.2)
PROSTATE SPECIFIC ANTIGEN: 0.47 NG/ML (ref 0–1)
RBC # BLD: 4.58 MILL/MM3 (ref 4.7–6.1)
SEG NEUTROPHILS: 63.5 %
SEGMENTED NEUTROPHILS ABSOLUTE COUNT: 4.6 THOU/MM3 (ref 1.8–7.7)
SODIUM BLD-SCNC: 144 MEQ/L (ref 135–145)
T4 FREE: 1.05 NG/DL (ref 0.93–1.76)
TOTAL PROTEIN: 7 G/DL (ref 6.1–8)
TRIGL SERPL-MCNC: 98 MG/DL (ref 0–199)
TSH SERPL DL<=0.05 MIU/L-ACNC: 4.94 UIU/ML (ref 0.4–4.2)
WBC # BLD: 7.2 THOU/MM3 (ref 4.8–10.8)

## 2021-05-10 ENCOUNTER — TELEPHONE (OUTPATIENT)
Dept: FAMILY MEDICINE CLINIC | Age: 86
End: 2021-05-10

## 2021-05-10 NOTE — TELEPHONE ENCOUNTER
----- Message from Levan Dance, MD sent at 5/9/2021 10:29 AM EDT -----  Call labs all stable and no changes

## 2021-05-11 ENCOUNTER — OFFICE VISIT (OUTPATIENT)
Dept: FAMILY MEDICINE CLINIC | Age: 86
End: 2021-05-11

## 2021-05-11 VITALS
DIASTOLIC BLOOD PRESSURE: 64 MMHG | TEMPERATURE: 97.3 F | WEIGHT: 186 LBS | HEART RATE: 80 BPM | SYSTOLIC BLOOD PRESSURE: 110 MMHG | RESPIRATION RATE: 16 BRPM | HEIGHT: 70 IN | BODY MASS INDEX: 26.63 KG/M2

## 2021-05-11 DIAGNOSIS — M48.062 LUMBAR STENOSIS WITH NEUROGENIC CLAUDICATION: ICD-10-CM

## 2021-05-11 DIAGNOSIS — E78.00 PURE HYPERCHOLESTEROLEMIA: ICD-10-CM

## 2021-05-11 DIAGNOSIS — N40.0 BENIGN PROSTATIC HYPERPLASIA WITHOUT LOWER URINARY TRACT SYMPTOMS: ICD-10-CM

## 2021-05-11 DIAGNOSIS — N40.1 BENIGN NON-NODULAR PROSTATIC HYPERPLASIA WITH LOWER URINARY TRACT SYMPTOMS: ICD-10-CM

## 2021-05-11 DIAGNOSIS — I25.10 CORONARY ARTERY DISEASE INVOLVING NATIVE CORONARY ARTERY OF NATIVE HEART WITHOUT ANGINA PECTORIS: ICD-10-CM

## 2021-05-11 DIAGNOSIS — E03.4 HYPOTHYROIDISM DUE TO ACQUIRED ATROPHY OF THYROID: ICD-10-CM

## 2021-05-11 DIAGNOSIS — E11.9 TYPE 2 DIABETES MELLITUS WITHOUT COMPLICATION, WITHOUT LONG-TERM CURRENT USE OF INSULIN (HCC): Primary | ICD-10-CM

## 2021-05-11 DIAGNOSIS — K63.5 POLYP OF COLON, UNSPECIFIED PART OF COLON, UNSPECIFIED TYPE: ICD-10-CM

## 2021-05-11 DIAGNOSIS — I10 ESSENTIAL HYPERTENSION: ICD-10-CM

## 2021-05-11 LAB
CHP ED QC CHECK: NORMAL
CREATININE URINE POCT: NEGATIVE
GLUCOSE BLD-MCNC: 110 MG/DL
HBA1C MFR BLD: 7.6 %
HEMOCCULT STL QL: NEGATIVE
HEMOCCULT STL QL: NORMAL
HEMOCCULT STL QL: NORMAL
MICROALBUMIN/CREAT 24H UR: NEGATIVE MG/G{CREAT}
MICROALBUMIN/CREAT UR-RTO: NORMAL

## 2021-05-11 PROCEDURE — 1123F ACP DISCUSS/DSCN MKR DOCD: CPT | Performed by: FAMILY MEDICINE

## 2021-05-11 PROCEDURE — 82962 GLUCOSE BLOOD TEST: CPT | Performed by: FAMILY MEDICINE

## 2021-05-11 PROCEDURE — G8417 CALC BMI ABV UP PARAM F/U: HCPCS | Performed by: FAMILY MEDICINE

## 2021-05-11 PROCEDURE — 1036F TOBACCO NON-USER: CPT | Performed by: FAMILY MEDICINE

## 2021-05-11 PROCEDURE — 4040F PNEUMOC VAC/ADMIN/RCVD: CPT | Performed by: FAMILY MEDICINE

## 2021-05-11 PROCEDURE — 83036 HEMOGLOBIN GLYCOSYLATED A1C: CPT | Performed by: FAMILY MEDICINE

## 2021-05-11 PROCEDURE — 99214 OFFICE O/P EST MOD 30 MIN: CPT | Performed by: FAMILY MEDICINE

## 2021-05-11 PROCEDURE — 82044 UR ALBUMIN SEMIQUANTITATIVE: CPT | Performed by: FAMILY MEDICINE

## 2021-05-11 PROCEDURE — G8427 DOCREV CUR MEDS BY ELIG CLIN: HCPCS | Performed by: FAMILY MEDICINE

## 2021-05-11 PROCEDURE — 82270 OCCULT BLOOD FECES: CPT | Performed by: FAMILY MEDICINE

## 2021-05-11 RX ORDER — LEVOTHYROXINE SODIUM 0.03 MG/1
TABLET ORAL
Qty: 90 TABLET | Refills: 1 | Status: SHIPPED | OUTPATIENT
Start: 2021-05-11 | End: 2021-11-05

## 2021-05-11 RX ORDER — TAMSULOSIN HYDROCHLORIDE 0.4 MG/1
0.4 CAPSULE ORAL DAILY
Qty: 180 CAPSULE | Refills: 1 | Status: SHIPPED | OUTPATIENT
Start: 2021-05-11 | End: 2021-11-05

## 2021-05-11 RX ORDER — TAMSULOSIN HYDROCHLORIDE 0.4 MG/1
0.4 CAPSULE ORAL DAILY
Qty: 90 CAPSULE | Refills: 1 | Status: SHIPPED | OUTPATIENT
Start: 2021-05-11 | End: 2021-05-11 | Stop reason: ALTCHOICE

## 2021-05-11 RX ORDER — SIMVASTATIN 10 MG
TABLET ORAL
Qty: 90 TABLET | Refills: 1 | Status: SHIPPED | OUTPATIENT
Start: 2021-05-11 | End: 2021-08-18

## 2021-05-11 ASSESSMENT — ENCOUNTER SYMPTOMS
NAUSEA: 0
COUGH: 0
CHEST TIGHTNESS: 0
ABDOMINAL PAIN: 0
BLOOD IN STOOL: 0
CONSTIPATION: 0
SORE THROAT: 0
TROUBLE SWALLOWING: 0
BACK PAIN: 1
EYE PAIN: 0
SHORTNESS OF BREATH: 0

## 2021-05-11 NOTE — PROGRESS NOTES
Subjective:      Patient ID: Shayla Parsons is a 81 y.o. male. Weakness  At  Times        Arthritis  Noted  Of  Back and knees     ashd  Stable       Thyroid  Stable       gerd  stabl e         Diabetes  He presents for his follow-up diabetic visit. His disease course has been stable. There are no hypoglycemic associated symptoms. Pertinent negatives for hypoglycemia include no dizziness or headaches. There are no diabetic associated symptoms. Pertinent negatives for diabetes include no chest pain, no fatigue and no weakness. There are no hypoglycemic complications. Symptoms are stable. There are no diabetic complications. Current diabetic treatment includes oral agent (dual therapy). He is compliant with treatment all of the time. His weight is stable. An ACE inhibitor/angiotensin II receptor blocker is not being taken. Eye exam is current. Hypertension  This is a chronic problem. The current episode started more than 1 year ago. The problem has been resolved since onset. The problem is controlled. Pertinent negatives include no chest pain, headaches, neck pain, palpitations, peripheral edema or shortness of breath. The current treatment provides significant improvement. There are no compliance problems.       Past Medical History:   Diagnosis Date    CAD (coronary artery disease)     Colon polyp     Hyperlipidemia     Hypertension     S/P CABG (coronary artery bypass graft)     Type II or unspecified type diabetes mellitus without mention of complication, not stated as uncontrolled      Past Surgical History:   Procedure Laterality Date   [address] [facility] and 1995    left and  right     COLONOSCOPY  2009    dr. Marisol Harrell and due 2014   [facility]  [facility]   [address]  11/2019      L4  and  L5   kasen   at   [facility] Left 06/03/2019    Dr Pedro Zuleta @ [facility]      Social History     Socioeconomic History    Marital status:      Spouse name: Not on file    Number of children: Not on file    Years of education: Not on file    Highest education level: Not on file   Occupational History    Not on file   Social Needs    Financial resource strain: Not on file    Food insecurity     Worry: Not on file     Inability: Not on file    Transportation needs     Medical: Not on file     Non-medical: Not on file   Tobacco Use    Smoking status: Never Smoker    Smokeless tobacco: Never Used   Substance and Sexual Activity    Alcohol use: Yes     Alcohol/week: 0.0 standard drinks     Comment: very rare    Drug use: No    Sexual activity: Not on file   Lifestyle    Physical activity     Days per week: Not on file     Minutes per session: Not on file    Stress: Not on file   Relationships    Social connections     Talks on phone: Not on file     Gets together: Not on file     Attends Worship service: Not on file     Active member of club or organization: Not on file     Attends meetings of clubs or organizations: Not on file     Relationship status: Not on file    Intimate partner violence     Fear of current or ex partner: Not on file     Emotionally abused: Not on file     Physically abused: Not on file     Forced sexual activity: Not on file   Other Topics Concern    Not on file   Social History Narrative    Not on file     Family History   Problem Relation Age of Onset    Heart Disease Father     Heart Disease Mother      Review of Systems   Constitutional: Negative for fatigue and fever. HENT: Negative for congestion, ear pain, postnasal drip, sore throat and trouble swallowing. Eyes: Negative for pain. Respiratory: Negative for cough, chest tightness and shortness of breath. Cardiovascular: Negative for chest pain, palpitations and leg swelling. Gastrointestinal: Negative for abdominal pain, blood in stool, constipation and nausea.    Genitourinary: Negative for difficulty urinating, frequency and urgency. Musculoskeletal: Positive for back pain. Negative for arthralgias, joint swelling, neck pain and neck stiffness. Skin: Negative for rash. Neurological: Negative for dizziness, weakness and headaches. Hematological: Negative for adenopathy. Does not bruise/bleed easily. Psychiatric/Behavioral: Negative for behavioral problems, dysphoric mood and sleep disturbance. /64 (Site: Right Upper Arm, Position: Sitting, Cuff Size: Medium Adult)   Pulse 80   Temp 97.3 °F (36.3 °C) (Infrared)   Resp 16   Ht 5' 10\" (1.778 m)   Wt 186 lb (84.4 kg)   BMI 26.69 kg/m²   Objective:   Physical Exam  Vitals signs and nursing note reviewed. Constitutional:       Appearance: He is well-developed. HENT:      Head: Normocephalic and atraumatic. Right Ear: External ear normal.      Left Ear: External ear normal.      Nose: Nose normal.   Eyes:      Conjunctiva/sclera: Conjunctivae normal.      Pupils: Pupils are equal, round, and reactive to light. Comments: Fundi nl   Neck:      Musculoskeletal: Normal range of motion and neck supple. Thyroid: No thyromegaly. Cardiovascular:      Rate and Rhythm: Normal rate and regular rhythm. Pulses:           Carotid pulses are 0 on the right side and 0 on the left side. Popliteal pulses are 1+ on the right side and 1+ on the left side. Dorsalis pedis pulses are 1+ on the right side and 1+ on the left side. Heart sounds: Murmur present. Pulmonary:      Effort: Pulmonary effort is normal.      Breath sounds: Normal breath sounds. No wheezing (1/6 LEEANN) or rales. Abdominal:      General: Bowel sounds are normal.      Palpations: Abdomen is soft. There is no mass. Tenderness: There is no abdominal tenderness. Hernia: There is no hernia in the left inguinal area or right inguinal area. Genitourinary:     Penis: Uncircumcised. Testes: Normal.         Right: Mass not present.       Epididymis:      Right: Normal. metoprolol tartrate (LOPRESSOR) 25 MG tablet TAKE 1/2 TABLET TWICE A DAY 90 tablet 1    glimepiride (AMARYL) 4 MG tablet TAKE 1/2 TABLET TWICE A DAY 90 tablet 1    Blood Glucose Monitoring Suppl (TRUE METRIX METER) CRESCENCIO Use to check blood sugar daily, Dx: E11.9 1 Device 0    blood glucose test strips (TRUE METRIX BLOOD GLUCOSE TEST) strip Use to check blood sugar daily, Dx: E11.9 300 each 3    polyethylene glycol (MIRALAX) 17 GM/SCOOP powder Take 17 g by mouth daily 510 g 2    Famotidine (PEPCID PO) Take by mouth as needed      acetaminophen (TYLENOL) 500 MG tablet Take 500 mg by mouth every 6 hours as needed for Pain      nitroGLYCERIN (NITROSTAT) 0.4 MG SL tablet Place 1 tablet under the tongue every 5 minutes as needed for Chest pain 25 tablet 5    Ascorbic Acid (VITAMIN C) 1000 MG tablet Take 1,000 mg by mouth daily.  aspirin EC 81 MG EC tablet Take 81 mg by mouth daily. No current facility-administered medications for this visit. Orders Placed This Encounter   Procedures    POCT Glucose    POCT glycosylated hemoglobin (Hb A1C)    POCT occult blood stool    POCT microalbumin     Results for orders placed or performed in visit on 05/11/21   POCT Glucose   Result Value Ref Range    Glucose 110 mg/dL    QC OK? POCT glycosylated hemoglobin (Hb A1C)   Result Value Ref Range    Hemoglobin A1C 7.6 (A) %   POCT occult blood stool   Result Value Ref Range    OCCULT BLOOD FECAL NEGATIVE     OCCULT BLOOD FECAL      OCCULT BLOOD FECAL       Ravi received counseling on the following healthy behaviors: nutrition and exercise    Patient given educational materials on Diabetes and Hyperlipidemia    I have instructed Roula Johansen to complete a self tracking handout on Blood Sugars  and Blood Pressures  and instructed them to bring it with them to his next appointment. Discussed use, benefit, and side effects of prescribed medications. Barriers to medication compliance addressed.   All patient questions answered. Pt voiced understanding.          FLOMAX  TO  2  A DAY  SE IN  3 MTHS   Jameel Asencio MD

## 2021-06-03 ENCOUNTER — OFFICE VISIT (OUTPATIENT)
Dept: FAMILY MEDICINE CLINIC | Age: 86
End: 2021-06-03

## 2021-06-03 VITALS
TEMPERATURE: 96.8 F | WEIGHT: 179.38 LBS | HEIGHT: 70 IN | BODY MASS INDEX: 25.68 KG/M2 | RESPIRATION RATE: 12 BRPM | SYSTOLIC BLOOD PRESSURE: 130 MMHG | DIASTOLIC BLOOD PRESSURE: 68 MMHG | HEART RATE: 68 BPM

## 2021-06-03 DIAGNOSIS — I10 ESSENTIAL HYPERTENSION: ICD-10-CM

## 2021-06-03 DIAGNOSIS — I95.1 AUTONOMIC ORTHOSTATIC HYPOTENSION: Primary | ICD-10-CM

## 2021-06-03 DIAGNOSIS — I25.10 CORONARY ARTERY DISEASE INVOLVING NATIVE CORONARY ARTERY OF NATIVE HEART WITHOUT ANGINA PECTORIS: ICD-10-CM

## 2021-06-03 PROCEDURE — 1123F ACP DISCUSS/DSCN MKR DOCD: CPT | Performed by: FAMILY MEDICINE

## 2021-06-03 PROCEDURE — G8427 DOCREV CUR MEDS BY ELIG CLIN: HCPCS | Performed by: FAMILY MEDICINE

## 2021-06-03 PROCEDURE — 1036F TOBACCO NON-USER: CPT | Performed by: FAMILY MEDICINE

## 2021-06-03 PROCEDURE — 99213 OFFICE O/P EST LOW 20 MIN: CPT | Performed by: FAMILY MEDICINE

## 2021-06-03 PROCEDURE — G8417 CALC BMI ABV UP PARAM F/U: HCPCS | Performed by: FAMILY MEDICINE

## 2021-06-03 PROCEDURE — 4040F PNEUMOC VAC/ADMIN/RCVD: CPT | Performed by: FAMILY MEDICINE

## 2021-06-03 RX ORDER — MIDODRINE HYDROCHLORIDE 5 MG/1
5 TABLET ORAL 3 TIMES DAILY
Qty: 90 TABLET | Refills: 3 | Status: SHIPPED | OUTPATIENT
Start: 2021-06-03 | End: 2021-06-22 | Stop reason: SDUPTHER

## 2021-06-03 SDOH — ECONOMIC STABILITY: FOOD INSECURITY: WITHIN THE PAST 12 MONTHS, YOU WORRIED THAT YOUR FOOD WOULD RUN OUT BEFORE YOU GOT MONEY TO BUY MORE.: NEVER TRUE

## 2021-06-03 SDOH — ECONOMIC STABILITY: FOOD INSECURITY: WITHIN THE PAST 12 MONTHS, THE FOOD YOU BOUGHT JUST DIDN'T LAST AND YOU DIDN'T HAVE MONEY TO GET MORE.: NEVER TRUE

## 2021-06-03 ASSESSMENT — ENCOUNTER SYMPTOMS
NAUSEA: 0
SORE THROAT: 0
BACK PAIN: 0
CONSTIPATION: 0
COUGH: 0
ABDOMINAL PAIN: 0
TROUBLE SWALLOWING: 0
CHEST TIGHTNESS: 0
EYE PAIN: 0
BLOOD IN STOOL: 0
SHORTNESS OF BREATH: 0

## 2021-06-03 ASSESSMENT — SOCIAL DETERMINANTS OF HEALTH (SDOH): HOW HARD IS IT FOR YOU TO PAY FOR THE VERY BASICS LIKE FOOD, HOUSING, MEDICAL CARE, AND HEATING?: NOT HARD AT ALL

## 2021-06-03 NOTE — PROGRESS NOTES
Subjective:      Patient ID: Shahana Bryant is a 81 y.o. male. dizziness  Noted   And  orthostatic  As  sbp  Lying  170  To  sitting  At  120     Dizziness  This is a new problem. The current episode started 1 to 4 weeks ago. The problem has been resolved. Pertinent negatives include no abdominal pain, arthralgias, chest pain, chills, congestion, coughing, fatigue, fever, headaches, joint swelling, nausea, rash, sore throat or weakness. Nothing aggravates the symptoms. He has tried nothing for the symptoms. The treatment provided no relief. Past Medical History:   Diagnosis Date    CAD (coronary artery disease)     Colon polyp     Hyperlipidemia     Hypertension     S/P CABG (coronary artery bypass graft)     Type II or unspecified type diabetes mellitus without mention of complication, not stated as uncontrolled        Review of Systems   Constitutional: Negative for chills, fatigue and fever. HENT: Negative for congestion, ear pain, postnasal drip, sore throat and trouble swallowing. Eyes: Negative for pain. Respiratory: Negative for cough, chest tightness and shortness of breath. Cardiovascular: Negative for chest pain, palpitations and leg swelling. Gastrointestinal: Negative for abdominal pain, blood in stool, constipation and nausea. Genitourinary: Negative for difficulty urinating, frequency and urgency. Musculoskeletal: Negative for arthralgias, back pain, joint swelling and neck stiffness. Skin: Negative for rash. Neurological: Positive for dizziness. Negative for weakness and headaches. Hematological: Negative for adenopathy. Does not bruise/bleed easily. Psychiatric/Behavioral: Negative for behavioral problems, dysphoric mood and sleep disturbance.      /68 (Site: Right Upper Arm, Position: Sitting, Cuff Size: Medium Adult)   Pulse 68   Temp 96.8 °F (36 °C) (Infrared)   Resp 12   Ht 5' 10\" (1.778 m)   Wt 179 lb 6 oz (81.4 kg)   BMI 25.74 kg/m² Objective:   Physical Exam  Vitals and nursing note reviewed. Constitutional:       Appearance: He is well-developed. HENT:      Head: Normocephalic and atraumatic. Right Ear: External ear normal.      Left Ear: External ear normal.      Nose: Nose normal.   Eyes:      Conjunctiva/sclera: Conjunctivae normal.      Pupils: Pupils are equal, round, and reactive to light. Comments: Fundi nl   Neck:      Thyroid: No thyromegaly. Cardiovascular:      Rate and Rhythm: Normal rate and regular rhythm. Heart sounds: Normal heart sounds. Pulmonary:      Effort: Pulmonary effort is normal.      Breath sounds: Normal breath sounds. No wheezing or rales. Abdominal:      General: Bowel sounds are normal.      Palpations: Abdomen is soft. There is no mass. Tenderness: There is no abdominal tenderness. Musculoskeletal:         General: Normal range of motion. Cervical back: Normal range of motion and neck supple. Lymphadenopathy:      Cervical: No cervical adenopathy. Skin:     General: Skin is warm and dry. Findings: No rash. Neurological:      Mental Status: He is alert and oriented to person, place, and time. Cranial Nerves: No cranial nerve deficit. Deep Tendon Reflexes: Reflexes are normal and symmetric. Assessment:       Diagnosis Orders   1. Autonomic orthostatic hypotension  midodrine (PROAMATINE) 5 MG tablet   2. Coronary artery disease involving native coronary artery of native heart without angina pectoris     3.  Essential hypertension           Plan:      Current Outpatient Medications   Medication Sig Dispense Refill    midodrine (PROAMATINE) 5 MG tablet Take 1 tablet by mouth 3 times daily 90 tablet 3    metFORMIN (GLUCOPHAGE) 500 MG tablet TAKE 1 TABLET EVERY MORNINGAND TAKE 2 TABLETS EVERY   EVENING 270 tablet 1    levothyroxine (SYNTHROID) 25 MCG tablet TAKE 1 TABLET DAILY 90 tablet 1    simvastatin (ZOCOR) 10 MG tablet TAKE 1 TABLET NIGHTLY (NEW LOWER DOSE) 90 tablet 1    tamsulosin (FLOMAX) 0.4 MG capsule Take 1 capsule by mouth daily 2 PILLS ONCE A  capsule 1    TRUEplus Lancets 33G MISC TEST BLOOD SUGARS DAILY      metoprolol tartrate (LOPRESSOR) 25 MG tablet TAKE 1/2 TABLET TWICE A DAY 90 tablet 1    glimepiride (AMARYL) 4 MG tablet TAKE 1/2 TABLET TWICE A DAY 90 tablet 1    Blood Glucose Monitoring Suppl (TRUE METRIX METER) CRESCENCIO Use to check blood sugar daily, Dx: E11.9 1 Device 0    blood glucose test strips (TRUE METRIX BLOOD GLUCOSE TEST) strip Use to check blood sugar daily, Dx: E11.9 300 each 3    polyethylene glycol (MIRALAX) 17 GM/SCOOP powder Take 17 g by mouth daily 510 g 2    Famotidine (PEPCID PO) Take by mouth as needed      acetaminophen (TYLENOL) 500 MG tablet Take 500 mg by mouth every 6 hours as needed for Pain      nitroGLYCERIN (NITROSTAT) 0.4 MG SL tablet Place 1 tablet under the tongue every 5 minutes as needed for Chest pain 25 tablet 5    Ascorbic Acid (VITAMIN C) 1000 MG tablet Take 1,000 mg by mouth daily.  aspirin EC 81 MG EC tablet Take 81 mg by mouth daily. No current facility-administered medications for this visit.          See in  [address]        Start  proamatine   5  Mg tid    Omaira Pond MD

## 2021-06-10 ENCOUNTER — TELEPHONE (OUTPATIENT)
Dept: FAMILY MEDICINE CLINIC | Age: 86
End: 2021-06-10

## 2021-06-10 NOTE — TELEPHONE ENCOUNTER
Lillie Swann, wife called - started at 8:40 am his BP was 99/53 pulse 77, then awhile later BP 93/54 pulse 75. A little later BP was 135/73 pulse 69. A little later BP was 139/80 pulse 70. Earlier when he was up and moving he was a little unsteady. Sat down and rested for awhile. Just now he walked around the house a little and he is better.

## 2021-06-10 NOTE — TELEPHONE ENCOUNTER
I checked with  and he said to call wife and make sure he is getting Proamatine before getting out of bed. And then wait about 30 minutes after taking the Proamatine to take his BP. She said she will do this for a few days and then will call us back.

## 2021-06-22 ENCOUNTER — OFFICE VISIT (OUTPATIENT)
Dept: FAMILY MEDICINE CLINIC | Age: 86
End: 2021-06-22

## 2021-06-22 VITALS
DIASTOLIC BLOOD PRESSURE: 74 MMHG | TEMPERATURE: 97.2 F | RESPIRATION RATE: 18 BRPM | WEIGHT: 179.5 LBS | SYSTOLIC BLOOD PRESSURE: 134 MMHG | BODY MASS INDEX: 25.7 KG/M2 | HEART RATE: 80 BPM | HEIGHT: 70 IN

## 2021-06-22 DIAGNOSIS — I10 ESSENTIAL HYPERTENSION: ICD-10-CM

## 2021-06-22 DIAGNOSIS — I95.1 AUTONOMIC ORTHOSTATIC HYPOTENSION: Primary | ICD-10-CM

## 2021-06-22 DIAGNOSIS — E11.9 TYPE 2 DIABETES MELLITUS WITHOUT COMPLICATION, WITHOUT LONG-TERM CURRENT USE OF INSULIN (HCC): ICD-10-CM

## 2021-06-22 DIAGNOSIS — I25.10 CORONARY ARTERY DISEASE INVOLVING NATIVE CORONARY ARTERY OF NATIVE HEART WITHOUT ANGINA PECTORIS: ICD-10-CM

## 2021-06-22 DIAGNOSIS — E78.2 MIXED HYPERLIPIDEMIA: ICD-10-CM

## 2021-06-22 DIAGNOSIS — E03.4 HYPOTHYROIDISM DUE TO ACQUIRED ATROPHY OF THYROID: ICD-10-CM

## 2021-06-22 DIAGNOSIS — R06.09 DYSPNEA ON EXERTION: ICD-10-CM

## 2021-06-22 PROCEDURE — G8427 DOCREV CUR MEDS BY ELIG CLIN: HCPCS | Performed by: FAMILY MEDICINE

## 2021-06-22 PROCEDURE — 4040F PNEUMOC VAC/ADMIN/RCVD: CPT | Performed by: FAMILY MEDICINE

## 2021-06-22 PROCEDURE — 1123F ACP DISCUSS/DSCN MKR DOCD: CPT | Performed by: FAMILY MEDICINE

## 2021-06-22 PROCEDURE — 1036F TOBACCO NON-USER: CPT | Performed by: FAMILY MEDICINE

## 2021-06-22 PROCEDURE — G8417 CALC BMI ABV UP PARAM F/U: HCPCS | Performed by: FAMILY MEDICINE

## 2021-06-22 PROCEDURE — 99213 OFFICE O/P EST LOW 20 MIN: CPT | Performed by: FAMILY MEDICINE

## 2021-06-22 RX ORDER — MIDODRINE HYDROCHLORIDE 5 MG/1
TABLET ORAL
Qty: 305 TABLET | Refills: 2 | Status: SHIPPED | OUTPATIENT
Start: 2021-06-22 | End: 2022-03-21 | Stop reason: SDUPTHER

## 2021-06-22 ASSESSMENT — ENCOUNTER SYMPTOMS
NAUSEA: 0
ABDOMINAL PAIN: 0
BACK PAIN: 0
TROUBLE SWALLOWING: 0
EYE PAIN: 0
SHORTNESS OF BREATH: 0
CHEST TIGHTNESS: 0
BLOOD IN STOOL: 0
SORE THROAT: 0
CONSTIPATION: 0
COUGH: 0

## 2021-06-22 NOTE — PROGRESS NOTES
Subjective:      Patient ID: Yeimy De is a 81 y.o. male. Autonomic  Orthostatic  dysfunction    better   With  Medication      htn  Stable      ashd  Stable     Past Medical History:   Diagnosis Date    CAD (coronary artery disease)     Colon polyp     Hyperlipidemia     Hypertension     S/P CABG (coronary artery bypass graft)     Type II or unspecified type diabetes mellitus without mention of complication, not stated as uncontrolled        Review of Systems   Constitutional: Negative for fatigue and fever. HENT: Negative for congestion, ear pain, postnasal drip, sore throat and trouble swallowing. Eyes: Negative for pain. Respiratory: Negative for cough, chest tightness and shortness of breath. Cardiovascular: Negative for chest pain, palpitations and leg swelling. Gastrointestinal: Negative for abdominal pain, blood in stool, constipation and nausea. Genitourinary: Negative for difficulty urinating, frequency and urgency. Musculoskeletal: Negative for arthralgias, back pain, joint swelling and neck stiffness. Skin: Negative for rash. Neurological: Negative for dizziness, weakness and headaches. Hematological: Negative for adenopathy. Does not bruise/bleed easily. Psychiatric/Behavioral: Negative for behavioral problems, dysphoric mood and sleep disturbance. /74 (Site: Right Upper Arm, Position: Sitting, Cuff Size: Medium Adult)   Pulse 80   Temp 97.2 °F (36.2 °C) (Infrared)   Resp 18   Ht 5' 10\" (1.778 m)   Wt 179 lb 8 oz (81.4 kg)   BMI 25.76 kg/m²   Objective:   Physical Exam  Vitals and nursing note reviewed. Constitutional:       Appearance: He is well-developed. HENT:      Head: Normocephalic and atraumatic. Right Ear: External ear normal.      Left Ear: External ear normal.      Nose: Nose normal.   Eyes:      Conjunctiva/sclera: Conjunctivae normal.      Pupils: Pupils are equal, round, and reactive to light.       Comments: Fundi nl   Neck: Thyroid: No thyromegaly. Cardiovascular:      Rate and Rhythm: Normal rate and regular rhythm. Heart sounds: Normal heart sounds. Pulmonary:      Effort: Pulmonary effort is normal.      Breath sounds: Normal breath sounds. No wheezing or rales. Comments:   Some  dyspnea  Abdominal:      General: Bowel sounds are normal.      Palpations: Abdomen is soft. There is no mass. Tenderness: There is no abdominal tenderness. Musculoskeletal:         General: Normal range of motion. Cervical back: Normal range of motion and neck supple. Lymphadenopathy:      Cervical: No cervical adenopathy. Skin:     General: Skin is warm and dry. Findings: No rash. Neurological:      Mental Status: He is alert and oriented to person, place, and time. Cranial Nerves: No cranial nerve deficit. Deep Tendon Reflexes: Reflexes are normal and symmetric. Assessment:       Diagnosis Orders   1. Autonomic orthostatic hypotension  midodrine (PROAMATINE) 5 MG tablet   2. Essential hypertension     3. Dyspnea on exertion     4. Mixed hyperlipidemia     5. Coronary artery disease involving native coronary artery of native heart without angina pectoris     6. Type 2 diabetes mellitus without complication, without long-term current use of insulin ([facility])     7.  Hypothyroidism due to acquired atrophy of thyroid           Plan:      Current Outpatient Medications   Medication Sig Dispense Refill    midodrine (PROAMATINE) 5 MG tablet Take 1.5 tablets PO in the AM, and 1 tablet at lunch and dinner 305 tablet 2    metFORMIN (GLUCOPHAGE) 500 MG tablet TAKE 1 TABLET EVERY MORNINGAND TAKE 2 TABLETS EVERY   EVENING 270 tablet 1    levothyroxine (SYNTHROID) 25 MCG tablet TAKE 1 TABLET DAILY 90 tablet 1    simvastatin (ZOCOR) 10 MG tablet TAKE 1 TABLET NIGHTLY (NEW LOWER DOSE) 90 tablet 1    tamsulosin (FLOMAX) 0.4 MG capsule Take 1 capsule by mouth daily 2 PILLS ONCE A  capsule 1    TRUEplus Lancets 33G MISC TEST BLOOD SUGARS DAILY      metoprolol tartrate (LOPRESSOR) 25 MG tablet TAKE 1/2 TABLET TWICE A DAY 90 tablet 1    glimepiride (AMARYL) 4 MG tablet TAKE 1/2 TABLET TWICE A DAY 90 tablet 1    Blood Glucose Monitoring Suppl (TRUE METRIX METER) CRESCENCIO Use to check blood sugar daily, Dx: E11.9 1 Device 0    blood glucose test strips (TRUE METRIX BLOOD GLUCOSE TEST) strip Use to check blood sugar daily, Dx: E11.9 300 each 3    polyethylene glycol (MIRALAX) 17 GM/SCOOP powder Take 17 g by mouth daily 510 g 2    Famotidine (PEPCID PO) Take by mouth as needed      acetaminophen (TYLENOL) 500 MG tablet Take 500 mg by mouth every 6 hours as needed for Pain      nitroGLYCERIN (NITROSTAT) 0.4 MG SL tablet Place 1 tablet under the tongue every 5 minutes as needed for Chest pain 25 tablet 5    Ascorbic Acid (VITAMIN C) 1000 MG tablet Take 1,000 mg by mouth daily.  aspirin EC 81 MG EC tablet Take 81 mg by mouth daily. No current facility-administered medications for this visit.      Orders Placed This Encounter   Procedures    XR CHEST STANDARD (2 VW)     Standing Status:   Future     Standing Expiration Date:   6/22/2022     Order Specific Question:   Reason for exam:     Answer:   dyspnea    CBC Auto Differential     Standing Status:   Future     Standing Expiration Date:   6/22/2022    Basic Metabolic Panel     Standing Status:   Future     Standing Expiration Date:   6/22/2022    Brain Natriuretic Peptide     Standing Status:   Future     Standing Expiration Date:   6/22/2022         See in   2 mths         Roque Robins MD

## 2021-06-23 ENCOUNTER — HOSPITAL ENCOUNTER (OUTPATIENT)
Age: 86
Discharge: HOME | End: 2021-06-23
Payer: MEDICARE

## 2021-06-23 ENCOUNTER — HOSPITAL ENCOUNTER (OUTPATIENT)
Dept: GENERAL RADIOLOGY | Age: 86
Discharge: HOME | End: 2021-06-23
Payer: MEDICARE

## 2021-06-23 DIAGNOSIS — R06.09 DYSPNEA ON EXERTION: ICD-10-CM

## 2021-06-23 DIAGNOSIS — I10 ESSENTIAL HYPERTENSION: ICD-10-CM

## 2021-06-23 LAB
ANION GAP SERPL CALCULATED.3IONS-SCNC: 10 MEQ/L (ref 8–16)
BASOPHILS # BLD: 0.6 %
BASOPHILS ABSOLUTE: 0 THOU/MM3 (ref 0–0.1)
BUN BLDV-MCNC: 38 MG/DL (ref 7–22)
CALCIUM SERPL-MCNC: 10.1 MG/DL (ref 8.5–10.5)
CHLORIDE BLD-SCNC: 106 MEQ/L (ref 98–111)
CO2: 26 MEQ/L (ref 23–33)
CREAT SERPL-MCNC: 1.1 MG/DL (ref 0.4–1.2)
EOSINOPHIL # BLD: 3.9 %
EOSINOPHILS ABSOLUTE: 0.3 THOU/MM3 (ref 0–0.4)
ERYTHROCYTE [DISTWIDTH] IN BLOOD BY AUTOMATED COUNT: 12.1 % (ref 11.5–14.5)
ERYTHROCYTE [DISTWIDTH] IN BLOOD BY AUTOMATED COUNT: 43.2 FL (ref 35–45)
GFR SERPL CREATININE-BSD FRML MDRD: 64 ML/MIN/1.73M2
GLUCOSE BLD-MCNC: 152 MG/DL (ref 70–108)
HCT VFR BLD CALC: 41.6 % (ref 42–52)
HEMOGLOBIN: 13 GM/DL (ref 14–18)
IMMATURE GRANS (ABS): 0.03 THOU/MM3 (ref 0–0.07)
IMMATURE GRANULOCYTES: 0.5 %
LYMPHOCYTES # BLD: 19.2 %
LYMPHOCYTES ABSOLUTE: 1.3 THOU/MM3 (ref 1–4.8)
MCH RBC QN AUTO: 30.4 PG (ref 26–33)
MCHC RBC AUTO-ENTMCNC: 31.3 GM/DL (ref 32.2–35.5)
MCV RBC AUTO: 97.2 FL (ref 80–94)
MONOCYTES # BLD: 9.3 %
MONOCYTES ABSOLUTE: 0.6 THOU/MM3 (ref 0.4–1.3)
NUCLEATED RED BLOOD CELLS: 0 /100 WBC
PLATELET # BLD: 171 THOU/MM3 (ref 130–400)
PMV BLD AUTO: 9.3 FL (ref 9.4–12.4)
POTASSIUM SERPL-SCNC: 5.1 MEQ/L (ref 3.5–5.2)
PRO-BNP: 894.2 PG/ML (ref 0–1800)
RBC # BLD: 4.28 MILL/MM3 (ref 4.7–6.1)
SEG NEUTROPHILS: 66.5 %
SEGMENTED NEUTROPHILS ABSOLUTE COUNT: 4.5 THOU/MM3 (ref 1.8–7.7)
SODIUM BLD-SCNC: 142 MEQ/L (ref 135–145)
WBC # BLD: 6.7 THOU/MM3 (ref 4.8–10.8)

## 2021-06-23 PROCEDURE — 80048 BASIC METABOLIC PNL TOTAL CA: CPT

## 2021-06-23 PROCEDURE — 85025 COMPLETE CBC W/AUTO DIFF WBC: CPT

## 2021-06-23 PROCEDURE — 83880 ASSAY OF NATRIURETIC PEPTIDE: CPT

## 2021-06-23 PROCEDURE — 36415 COLL VENOUS BLD VENIPUNCTURE: CPT

## 2021-06-23 PROCEDURE — 71046 X-RAY EXAM CHEST 2 VIEWS: CPT

## 2021-06-28 ENCOUNTER — TELEPHONE (OUTPATIENT)
Dept: FAMILY MEDICINE CLINIC | Age: 86
End: 2021-06-28

## 2021-08-16 DIAGNOSIS — I10 ESSENTIAL HYPERTENSION: ICD-10-CM

## 2021-08-16 DIAGNOSIS — E11.8 TYPE 2 DIABETES MELLITUS WITH COMPLICATION, WITHOUT LONG-TERM CURRENT USE OF INSULIN (HCC): ICD-10-CM

## 2021-08-16 DIAGNOSIS — E78.00 PURE HYPERCHOLESTEROLEMIA: ICD-10-CM

## 2021-08-17 NOTE — TELEPHONE ENCOUNTER
Date of last visit:  6/22/2021  Date of next visit:  8/18/2021    Requested Prescriptions     Pending Prescriptions Disp Refills    glimepiride (AMARYL) 4 MG tablet [Pharmacy Med Name: GLIMEPIRIDE  TAB 4MG] 90 tablet 1     Sig: TAKE 1/2 TABLET TWICE A DAY    simvastatin (ZOCOR) 10 MG tablet [Pharmacy Med Name: SIMVASTATIN  TAB 10MG] 90 tablet 1     Sig: TAKE 1 TABLET NIGHTLY (NEW LOWER DOSE)    metoprolol tartrate (LOPRESSOR) 25 MG tablet [Pharmacy Med Name: METOPROLOL TAB TAR 25MG] 90 tablet 1     Sig: TAKE 1/2 TABLET TWICE A DAY

## 2021-08-18 ENCOUNTER — OFFICE VISIT (OUTPATIENT)
Dept: FAMILY MEDICINE CLINIC | Age: 86
End: 2021-08-18

## 2021-08-18 VITALS
HEART RATE: 76 BPM | TEMPERATURE: 96.9 F | BODY MASS INDEX: 25.38 KG/M2 | HEIGHT: 70 IN | DIASTOLIC BLOOD PRESSURE: 68 MMHG | WEIGHT: 177.25 LBS | SYSTOLIC BLOOD PRESSURE: 132 MMHG | RESPIRATION RATE: 16 BRPM

## 2021-08-18 DIAGNOSIS — I10 ESSENTIAL HYPERTENSION: ICD-10-CM

## 2021-08-18 DIAGNOSIS — M48.062 LUMBAR STENOSIS WITH NEUROGENIC CLAUDICATION: ICD-10-CM

## 2021-08-18 DIAGNOSIS — K63.5 POLYP OF COLON, UNSPECIFIED PART OF COLON, UNSPECIFIED TYPE: ICD-10-CM

## 2021-08-18 DIAGNOSIS — E03.4 HYPOTHYROIDISM DUE TO ACQUIRED ATROPHY OF THYROID: ICD-10-CM

## 2021-08-18 DIAGNOSIS — E11.9 TYPE 2 DIABETES MELLITUS WITHOUT COMPLICATION, WITHOUT LONG-TERM CURRENT USE OF INSULIN (HCC): Primary | ICD-10-CM

## 2021-08-18 DIAGNOSIS — E78.2 MIXED HYPERLIPIDEMIA: ICD-10-CM

## 2021-08-18 DIAGNOSIS — I25.10 CORONARY ARTERY DISEASE INVOLVING NATIVE CORONARY ARTERY OF NATIVE HEART WITHOUT ANGINA PECTORIS: ICD-10-CM

## 2021-08-18 LAB
CHP ED QC CHECK: ABNORMAL
GLUCOSE BLD-MCNC: 232 MG/DL
HBA1C MFR BLD: 6.5 %

## 2021-08-18 PROCEDURE — 82962 GLUCOSE BLOOD TEST: CPT | Performed by: FAMILY MEDICINE

## 2021-08-18 PROCEDURE — G8417 CALC BMI ABV UP PARAM F/U: HCPCS | Performed by: FAMILY MEDICINE

## 2021-08-18 PROCEDURE — 99213 OFFICE O/P EST LOW 20 MIN: CPT | Performed by: FAMILY MEDICINE

## 2021-08-18 PROCEDURE — 4040F PNEUMOC VAC/ADMIN/RCVD: CPT | Performed by: FAMILY MEDICINE

## 2021-08-18 PROCEDURE — G8427 DOCREV CUR MEDS BY ELIG CLIN: HCPCS | Performed by: FAMILY MEDICINE

## 2021-08-18 PROCEDURE — 83036 HEMOGLOBIN GLYCOSYLATED A1C: CPT | Performed by: FAMILY MEDICINE

## 2021-08-18 PROCEDURE — 1123F ACP DISCUSS/DSCN MKR DOCD: CPT | Performed by: FAMILY MEDICINE

## 2021-08-18 PROCEDURE — 1036F TOBACCO NON-USER: CPT | Performed by: FAMILY MEDICINE

## 2021-08-18 RX ORDER — SIMVASTATIN 10 MG
TABLET ORAL
Qty: 90 TABLET | Refills: 1 | Status: SHIPPED | OUTPATIENT
Start: 2021-08-18 | End: 2021-11-23 | Stop reason: SDUPTHER

## 2021-08-18 RX ORDER — GLIMEPIRIDE 4 MG/1
TABLET ORAL
Qty: 90 TABLET | Refills: 1 | Status: SHIPPED | OUTPATIENT
Start: 2021-08-18 | End: 2022-01-21

## 2021-08-18 ASSESSMENT — ENCOUNTER SYMPTOMS
CONSTIPATION: 0
SHORTNESS OF BREATH: 0
NAUSEA: 0
SORE THROAT: 0
TROUBLE SWALLOWING: 0
EYE PAIN: 0
BACK PAIN: 0
ABDOMINAL PAIN: 0
CHEST TIGHTNESS: 0
BLOOD IN STOOL: 0
COUGH: 0

## 2021-08-18 NOTE — PROGRESS NOTES
Subjective:      Patient ID: Hector Mars is a 81 y.o. male. ashd    Stable       Low  Back  Stable       Total  Knee  Done  On left       bp stable and  For  Low  bp  bph  Stable     Diabetes  He presents for his follow-up diabetic visit. He has type 2 diabetes mellitus. His disease course has been stable. There are no hypoglycemic associated symptoms. Pertinent negatives for hypoglycemia include no dizziness or headaches. There are no diabetic associated symptoms. Pertinent negatives for diabetes include no chest pain, no fatigue and no weakness. There are no hypoglycemic complications. Symptoms are stable. There are no known risk factors for coronary artery disease. Current diabetic treatment includes oral agent (dual therapy). He is compliant with treatment all of the time. His weight is stable. Meal planning includes avoidance of concentrated sweets. An ACE inhibitor/angiotensin II receptor blocker is being taken. Hypertension  This is a chronic problem. The current episode started more than 1 year ago. The problem is controlled. Pertinent negatives include no chest pain, headaches, neck pain, palpitations, peripheral edema or shortness of breath. The current treatment provides significant improvement. There are no compliance problems. Past Medical History:   Diagnosis Date    CAD (coronary artery disease)     Colon polyp     Hyperlipidemia     Hypertension     S/P CABG (coronary artery bypass graft)     Type II or unspecified type diabetes mellitus without mention of complication, not stated as uncontrolled      Review of Systems   Constitutional: Negative for fatigue and fever. HENT: Negative for congestion, ear pain, postnasal drip, sore throat and trouble swallowing. Eyes: Negative for pain. Respiratory: Negative for cough, chest tightness and shortness of breath. Cardiovascular: Negative for chest pain, palpitations and leg swelling.    Gastrointestinal: Negative for abdominal pain, blood in stool, constipation and nausea. Genitourinary: Negative for difficulty urinating, frequency and urgency. Musculoskeletal: Negative for arthralgias, back pain, joint swelling, neck pain and neck stiffness. Skin: Negative for rash. Neurological: Negative for dizziness, weakness and headaches. Hematological: Negative for adenopathy. Does not bruise/bleed easily. Psychiatric/Behavioral: Negative for behavioral problems, dysphoric mood and sleep disturbance. /68 (Site: Right Upper Arm, Position: Sitting, Cuff Size: Medium Adult)   Pulse 76   Temp 96.9 °F (36.1 °C) (Infrared)   Resp 16   Ht 5' 10\" (1.778 m)   Wt 177 lb 4 oz (80.4 kg)   BMI 25.43 kg/m²   Objective:   Physical Exam  Vitals and nursing note reviewed. Constitutional:       Appearance: He is well-developed. HENT:      Head: Normocephalic and atraumatic. Right Ear: External ear normal.      Left Ear: External ear normal.      Nose: Nose normal.   Eyes:      Conjunctiva/sclera: Conjunctivae normal.      Pupils: Pupils are equal, round, and reactive to light. Comments: Fundi nl   Neck:      Thyroid: No thyromegaly. Cardiovascular:      Rate and Rhythm: Normal rate and regular rhythm. Heart sounds: Normal heart sounds. Pulmonary:      Effort: Pulmonary effort is normal.      Breath sounds: Normal breath sounds. No wheezing or rales. Abdominal:      General: Bowel sounds are normal.      Palpations: Abdomen is soft. There is no mass. Tenderness: There is no abdominal tenderness. Musculoskeletal:         General: Normal range of motion. Cervical back: Normal range of motion and neck supple. Lymphadenopathy:      Cervical: No cervical adenopathy. Skin:     General: Skin is warm and dry. Findings: No rash. Neurological:      Mental Status: He is alert and oriented to person, place, and time. Cranial Nerves: No cranial nerve deficit.       Deep Tendon Reflexes: Reflexes are normal and symmetric. Assessment:       Diagnosis Orders   1. Type 2 diabetes mellitus without complication, without long-term current use of insulin ([facility])  POCT Glucose    POCT glycosylated hemoglobin (Hb A1C)   2. Essential hypertension     3. Mixed hyperlipidemia     4. Coronary artery disease involving native coronary artery of native heart without angina pectoris     5. Polyp of colon, unspecified part of colon, unspecified type     6. Hypothyroidism due to acquired atrophy of thyroid     7. Lumbar stenosis with neurogenic claudication           Plan:      Current Outpatient Medications   Medication Sig Dispense Refill    glimepiride (AMARYL) 4 MG tablet TAKE 1/2 TABLET TWICE A DAY 90 tablet 1    simvastatin (ZOCOR) 10 MG tablet TAKE 1 TABLET NIGHTLY (NEW LOWER DOSE) 90 tablet 1    metoprolol tartrate (LOPRESSOR) 25 MG tablet TAKE 1/2 TABLET TWICE A DAY 90 tablet 1    midodrine (PROAMATINE) 5 MG tablet Take 1.5 tablets PO in the AM, and 1 tablet at lunch and dinner 305 tablet 2    metFORMIN (GLUCOPHAGE) 500 MG tablet TAKE 1 TABLET EVERY MORNINGAND TAKE 2 TABLETS EVERY   EVENING 270 tablet 1    levothyroxine (SYNTHROID) 25 MCG tablet TAKE 1 TABLET DAILY 90 tablet 1    tamsulosin (FLOMAX) 0.4 MG capsule Take 1 capsule by mouth daily 2 PILLS ONCE A  capsule 1    TRUEplus Lancets 33G MISC TEST BLOOD SUGARS DAILY      blood glucose test strips (TRUE METRIX BLOOD GLUCOSE TEST) strip Use to check blood sugar daily, Dx: E11.9 300 each 3    polyethylene glycol (MIRALAX) 17 GM/SCOOP powder Take 17 g by mouth daily 510 g 2    Famotidine (PEPCID PO) Take by mouth as needed      acetaminophen (TYLENOL) 500 MG tablet Take 500 mg by mouth every 6 hours as needed for Pain      nitroGLYCERIN (NITROSTAT) 0.4 MG SL tablet Place 1 tablet under the tongue every 5 minutes as needed for Chest pain 25 tablet 5    Ascorbic Acid (VITAMIN C) 1000 MG tablet Take 1,000 mg by mouth daily.       aspirin EC 81 MG EC tablet Take 81 mg by mouth daily. No current facility-administered medications for this visit. Orders Placed This Encounter   Procedures    POCT Glucose    POCT glycosylated hemoglobin (Hb A1C)     Results for orders placed or performed in visit on 08/18/21   POCT Glucose   Result Value Ref Range    Glucose 232 (A) mg/dL    QC OK? POCT glycosylated hemoglobin (Hb A1C)   Result Value Ref Range    Hemoglobin A1C 6.5 (A) %     Ravi received counseling on the following healthy behaviors: nutrition and exercise    Patient given educational materials on Diabetes and Hyperlipidemia    I have instructed Benjicarolann Lucas to complete a self tracking handout on Blood Sugars  and Blood Pressures  and instructed them to bring it with them to his next appointment. Discussed use, benefit, and side effects of prescribed medications. Barriers to medication compliance addressed. All patient questions answered. Pt voiced understanding.          Joya Morales MD

## 2021-09-16 ENCOUNTER — OFFICE VISIT (OUTPATIENT)
Dept: INTERNAL MEDICINE CLINIC | Age: 86
End: 2021-09-16
Payer: MEDICARE

## 2021-09-16 VITALS
HEART RATE: 68 BPM | TEMPERATURE: 97.4 F | DIASTOLIC BLOOD PRESSURE: 62 MMHG | WEIGHT: 178.4 LBS | SYSTOLIC BLOOD PRESSURE: 100 MMHG | BODY MASS INDEX: 25.54 KG/M2 | OXYGEN SATURATION: 93 % | HEIGHT: 70 IN

## 2021-09-16 DIAGNOSIS — I77.1 SUBCLAVIAN ARTERY STENOSIS (HCC): ICD-10-CM

## 2021-09-16 DIAGNOSIS — E03.4 HYPOTHYROIDISM DUE TO ACQUIRED ATROPHY OF THYROID: ICD-10-CM

## 2021-09-16 DIAGNOSIS — I10 ESSENTIAL HYPERTENSION: ICD-10-CM

## 2021-09-16 DIAGNOSIS — E11.9 TYPE 2 DIABETES MELLITUS WITHOUT COMPLICATION, WITHOUT LONG-TERM CURRENT USE OF INSULIN (HCC): Primary | ICD-10-CM

## 2021-09-16 DIAGNOSIS — Z95.1 HX OF CABG: ICD-10-CM

## 2021-09-16 DIAGNOSIS — E78.00 PURE HYPERCHOLESTEROLEMIA: ICD-10-CM

## 2021-09-16 DIAGNOSIS — R06.02 SOB (SHORTNESS OF BREATH) ON EXERTION: ICD-10-CM

## 2021-09-16 PROCEDURE — 1123F ACP DISCUSS/DSCN MKR DOCD: CPT | Performed by: INTERNAL MEDICINE

## 2021-09-16 PROCEDURE — 99213 OFFICE O/P EST LOW 20 MIN: CPT | Performed by: INTERNAL MEDICINE

## 2021-09-16 PROCEDURE — G8417 CALC BMI ABV UP PARAM F/U: HCPCS | Performed by: INTERNAL MEDICINE

## 2021-09-16 PROCEDURE — G8427 DOCREV CUR MEDS BY ELIG CLIN: HCPCS | Performed by: INTERNAL MEDICINE

## 2021-09-16 PROCEDURE — 1036F TOBACCO NON-USER: CPT | Performed by: INTERNAL MEDICINE

## 2021-09-16 PROCEDURE — 4040F PNEUMOC VAC/ADMIN/RCVD: CPT | Performed by: INTERNAL MEDICINE

## 2021-09-16 PROCEDURE — 93000 ELECTROCARDIOGRAM COMPLETE: CPT | Performed by: INTERNAL MEDICINE

## 2021-09-16 NOTE — PROGRESS NOTES
Russell Mcnally (:  1935) is a 81 y.o. male,Established patient, here for evaluation of the following chief complaint(s):  Shortness of Breath (with walking-having for the last couple of months), Fatigue, Hypertension, Diabetes, and Coronary Artery Disease (s/p cabg)         ASSESSMENT/PLAN:  1. Type 2 diabetes mellitus without complication, without long-term current use of insulin ([address]- last A1C 6.5  2. Pure hypercholesterolemia reviewed  3. Essential hypertension - now dealing with orthostatic hypotension- on midodrine  -     EKG 12 Lead  4. Hypothyroidism due to acquired atrophy of thyroid- T4 normal 5.21  5. Hx of CABG- 4 vessel ; no chest pain  Assessment & Plan:     6. Subclavian artery stenosis [facility])  Assessment & Plan: monitor. No dizziness     7. SOB (shortness of breath) on exertion- past few months; eg walking down driveway; will do echo. Never smoked. Denies chest pain. Pulse ox 96-93 with walk  -     EKG 12 Lead  -     ECHO Complete 2D W Doppler W Color      Return in about 6 months (around 3/16/2022). Subjective   SUBJECTIVE/OBJECTIVE:  HPI pt with diabetes, controlled, cad, cabg seen for routine f/u. He notes villa, new, going down driveway. No falls,    Now on midodrine for orthostasis; presumably dysautonomic.       Review of Systems       Objective   Physical Exam    /62 (Site: Left Upper Arm)   Pulse 68   Temp 97.4 °F (36.3 °C)   Ht 5' 10\" (1.778 m)   Wt 178 lb 6.4 oz (80.9 kg)   SpO2 93% Comment: WITH WALK ON ROOM AIR WITH FACE COVERING  BMI 25.60 kg/m²   General appearance - alert, well appearing, and in no distress    Mental Status - alert, oriented to person, place, and time          Neck - supple, no significant adenopathy        Chest - few crackles bases     Heart - normal rate and regular rhythm, no murmurs noted, S4 present     Abdomen - soft, nontender, nondistended, no masses or organomegaly         Neurological - alert, oriented, normal speech, no focal findings or movement disorder noted     Musculoskeletal -   msk exam:[id number]}     Extremities - no pedal edema noted     Skin - normal coloration and turgor, no rashes, no suspicious skin lesions noted            An electronic signature was used to authenticate this note.     --Blanche Valdez MD

## 2021-09-17 ENCOUNTER — HOSPITAL ENCOUNTER (OUTPATIENT)
Dept: NON INVASIVE DIAGNOSTICS | Age: 86
Discharge: HOME | End: 2021-09-17
Payer: MEDICARE

## 2021-09-17 LAB
LV EF: 55 %
LVEF MODALITY: NORMAL

## 2021-09-17 PROCEDURE — 93306 TTE W/DOPPLER COMPLETE: CPT

## 2021-09-20 ENCOUNTER — TELEPHONE (OUTPATIENT)
Dept: INTERNAL MEDICINE CLINIC | Age: 86
End: 2021-09-20

## 2021-09-20 DIAGNOSIS — R93.1 ABNORMAL ECHOCARDIOGRAM: ICD-10-CM

## 2021-09-20 DIAGNOSIS — R06.02 SOB (SHORTNESS OF BREATH) ON EXERTION: Primary | ICD-10-CM

## 2021-09-20 NOTE — TELEPHONE ENCOUNTER
I spoke with pts wife regarding referral to cardiology. She relayed message to pt as he does not hear well. He is in agreement to referral.  They would like Dr Judith Hood as pts wife see him.   Referral made and front office will arrange

## 2021-09-20 NOTE — TELEPHONE ENCOUNTER
----- Message from Irene Irene MD sent at 9/17/2021  5:10 PM EDT -----  Tell him the echo implies the front wall is not getting enough blood supply; would like him to see on of our cardiologists to see where we should proceed from here

## 2021-09-21 ENCOUNTER — TELEPHONE (OUTPATIENT)
Dept: CARDIOLOGY CLINIC | Age: 86
End: 2021-09-21

## 2021-09-21 NOTE — TELEPHONE ENCOUNTER
LM for pt to return call. NP referral.  LM for pt to return call. Diagnosis   R06.02 (ICD-10-CM) - SOB (shortness of breath) on exertion   R93.1 (ICD-10-CM) - Abnormal echocardiogram     Efren MORSE [facility] Clinical Support Pool  New patient referral to Dr. Cammy Sidhu for SOB and abnormal echo. Unable to get in within 2 weeks please contact patient to schedule.

## 2021-10-07 ENCOUNTER — OFFICE VISIT (OUTPATIENT)
Dept: CARDIOLOGY CLINIC | Age: 86
End: 2021-10-07
Payer: MEDICARE

## 2021-10-07 VITALS
HEART RATE: 84 BPM | HEIGHT: 72 IN | BODY MASS INDEX: 23.16 KG/M2 | SYSTOLIC BLOOD PRESSURE: 136 MMHG | DIASTOLIC BLOOD PRESSURE: 74 MMHG | WEIGHT: 171 LBS

## 2021-10-07 DIAGNOSIS — I25.810 CORONARY ARTERY DISEASE INVOLVING CORONARY BYPASS GRAFT OF NATIVE HEART WITHOUT ANGINA PECTORIS: Primary | ICD-10-CM

## 2021-10-07 DIAGNOSIS — E78.01 FAMILIAL HYPERCHOLESTEROLEMIA: ICD-10-CM

## 2021-10-07 DIAGNOSIS — I10 PRIMARY HYPERTENSION: ICD-10-CM

## 2021-10-07 DIAGNOSIS — R06.02 SHORTNESS OF BREATH: ICD-10-CM

## 2021-10-07 PROCEDURE — G8484 FLU IMMUNIZE NO ADMIN: HCPCS | Performed by: NUCLEAR MEDICINE

## 2021-10-07 PROCEDURE — 4040F PNEUMOC VAC/ADMIN/RCVD: CPT | Performed by: NUCLEAR MEDICINE

## 2021-10-07 PROCEDURE — 1036F TOBACCO NON-USER: CPT | Performed by: NUCLEAR MEDICINE

## 2021-10-07 PROCEDURE — 1123F ACP DISCUSS/DSCN MKR DOCD: CPT | Performed by: NUCLEAR MEDICINE

## 2021-10-07 PROCEDURE — G8427 DOCREV CUR MEDS BY ELIG CLIN: HCPCS | Performed by: NUCLEAR MEDICINE

## 2021-10-07 PROCEDURE — G8420 CALC BMI NORM PARAMETERS: HCPCS | Performed by: NUCLEAR MEDICINE

## 2021-10-07 PROCEDURE — 99204 OFFICE O/P NEW MOD 45 MIN: CPT | Performed by: NUCLEAR MEDICINE

## 2021-10-07 ASSESSMENT — ENCOUNTER SYMPTOMS
CHEST TIGHTNESS: 1
BLOOD IN STOOL: 0
ABDOMINAL PAIN: 0
RECTAL PAIN: 0
DIARRHEA: 0
COLOR CHANGE: 0
PHOTOPHOBIA: 0
BACK PAIN: 1
CONSTIPATION: 0
NAUSEA: 0
SHORTNESS OF BREATH: 1
ANAL BLEEDING: 0

## 2021-10-07 NOTE — PROGRESS NOTES
[address] [facility] [place]  [address]  [facility] [address]  Dept: [phone]  Dept Fax: [phone]  Loc: [phone]    Visit Date: 10/7/2021    Bess Olguin is a 81 y.o. male who presents todayfor:  Chief Complaint   Patient presents with    New Patient     abnormal EKG    Establish Cardiologist    Shortness of Breath    Hypertension    Diabetes    Hyperlipidemia    Coronary Artery Disease   here for the first time  Does have known CAD and CABG 2005   Here because of abnormal ECG   Not the best historian   Follows with richard  Does have back limitation   Not very active  Does have dyspnea on exertion   Does have more dyspnea than usual   Different aches and pains  Does have Dm   Seems to be fairly controlled  Does have hyperlipidemia  On statins for that   No dizziness  No syncope  No smoking   Family history of CAD        HPI:  HPI  Past Medical History:   Diagnosis Date    CAD (coronary artery disease)     Colon polyp     Hyperlipidemia     Hypertension     S/P CABG (coronary artery bypass graft)     Type II or unspecified type diabetes mellitus without mention of complication, not stated as uncontrolled       Past Surgical History:   Procedure Laterality Date   Xavier Yanez 67    left and  right     COLONOSCOPY  2009    dr. Jarrett Hendrix and due 2014    CORONARY ARTERY BYPASS GRAFT  2005    HERNIA REPAIR      LUMBAR SPINE SURGERY  11/2019      L4  and  L5   kashmen   at   [facility] Left 06/03/2019    Dr Farnaz Wagner @ [facility]      Family History   Problem Relation Age of Onset    Heart Disease Father     Heart Disease Mother      Social History     Tobacco Use    Smoking status: Never Smoker    Smokeless tobacco: Never Used   Substance Use Topics    Alcohol use:  Yes     Alcohol/week: 0.0 standard drinks     Comment: very rare      Current Outpatient Medications   Medication Sig Dispense Refill    glimepiride (AMARYL) 4 MG tablet TAKE 1/2 TABLET TWICE A DAY 90 tablet 1    simvastatin (ZOCOR) 10 MG tablet TAKE 1 TABLET NIGHTLY (NEW LOWER DOSE) 90 tablet 1    metoprolol tartrate (LOPRESSOR) 25 MG tablet TAKE 1/2 TABLET TWICE A DAY 90 tablet 1    midodrine (PROAMATINE) 5 MG tablet Take 1.5 tablets PO in the AM, and 1 tablet at lunch and dinner 305 tablet 2    metFORMIN (GLUCOPHAGE) 500 MG tablet TAKE 1 TABLET EVERY MORNINGAND TAKE 2 TABLETS EVERY   EVENING 270 tablet 1    levothyroxine (SYNTHROID) 25 MCG tablet TAKE 1 TABLET DAILY 90 tablet 1    tamsulosin (FLOMAX) 0.4 MG capsule Take 1 capsule by mouth daily 2 PILLS ONCE A  capsule 1    TRUEplus Lancets 33G MISC TEST BLOOD SUGARS DAILY      blood glucose test strips (TRUE METRIX BLOOD GLUCOSE TEST) strip Use to check blood sugar daily, Dx: E11.9 300 each 3    polyethylene glycol (MIRALAX) 17 GM/SCOOP powder Take 17 g by mouth daily 510 g 2    Famotidine (PEPCID PO) Take 20 mg by mouth as needed       acetaminophen (TYLENOL) 500 MG tablet Take 500 mg by mouth every 6 hours as needed for Pain      nitroGLYCERIN (NITROSTAT) 0.4 MG SL tablet Place 1 tablet under the tongue every 5 minutes as needed for Chest pain 25 tablet 5    Ascorbic Acid (VITAMIN C) 1000 MG tablet Take 1,000 mg by mouth daily.  aspirin EC 81 MG EC tablet Take 81 mg by mouth daily. No current facility-administered medications for this visit.      No Known Allergies  Health Maintenance   Topic Date Due    Shingles Vaccine (2 of 3) 01/18/2016    Flu vaccine (1) 09/01/2021    Annual Wellness Visit (AWV)  11/04/2021    Lipid screen  05/06/2022    PSA counseling  05/06/2022    DTaP/Tdap/Td vaccine (2 - Td or Tdap) 03/06/2029    Pneumococcal 65+ years Vaccine  Completed    COVID-19 Vaccine  Completed    Hepatitis A vaccine  Aged Out    Hib vaccine  Aged Out    Meningococcal (ACWY) vaccine  Aged Out       Subjective:  Review of Systems Constitutional: Positive for fatigue. HENT: Negative for ear pain and nosebleeds. Eyes: Negative for photophobia. Respiratory: Positive for chest tightness and shortness of breath. Gastrointestinal: Negative for abdominal pain, anal bleeding, blood in stool, constipation, diarrhea, nausea and rectal pain. Endocrine: Negative for polydipsia. Genitourinary: Negative for dysuria, frequency and urgency. Musculoskeletal: Positive for arthralgias, back pain and gait problem. Negative for joint swelling, myalgias, neck pain and neck stiffness. Skin: Negative for color change, pallor and rash. Allergic/Immunologic: Negative for food allergies. Neurological: Negative for dizziness, syncope, light-headedness and headaches. Psychiatric/Behavioral: Negative for confusion, decreased concentration, dysphoric mood, hallucinations and self-injury. The patient is not nervous/anxious and is not hyperactive. Objective:  Physical Exam  HENT:      Head: Normocephalic. Right Ear: Tympanic membrane normal.      Nose: Nose normal.      Mouth/Throat:      Mouth: Mucous membranes are moist.   Eyes:      Pupils: Pupils are equal, round, and reactive to light. Cardiovascular:      Rate and Rhythm: Normal rate and regular rhythm. Heart sounds: Murmur heard. No gallop. Pulmonary:      Effort: No respiratory distress. Breath sounds: No stridor. No wheezing, rhonchi or rales. Chest:      Chest wall: No tenderness. Abdominal:      General: There is no distension. Palpations: There is no mass. Tenderness: There is no abdominal tenderness. There is no left CVA tenderness, guarding or rebound. Hernia: No hernia is present. Musculoskeletal:         General: No swelling, tenderness, deformity or signs of injury. Cervical back: Normal range of motion. Right lower leg: No edema. Left lower leg: No edema. Skin:     Coloration: Skin is not jaundiced or pale. Findings: No bruising, erythema, lesion or rash. Neurological:      Mental Status: He is alert and oriented to person, place, and time. Cranial Nerves: No cranial nerve deficit. Sensory: No sensory deficit. Motor: No weakness. Coordination: Coordination normal.      Gait: Gait normal.      Deep Tendon Reflexes: Reflexes normal.   Psychiatric:         Mood and Affect: Mood normal.         Thought Content: Thought content normal.       /74   Pulse 84   Ht 6' (1.829 m)   Wt 171 lb (77.6 kg) Comment: weight per patient. unable to stand on scale  BMI 23.19 kg/m²     Assessment:      Diagnosis Orders   1. Coronary artery disease involving coronary bypass graft of native heart without angina pectoris     2. Primary hypertension     3. Familial hypercholesterolemia     4. 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 Discussed  rand   Decide after that   Consider a CTA chest to assess the aneurysm  Continue risk factor modification and medical management  Thank you for allowing me to participate in the care of your patient. Please don't hesitate to contact me regarding any further issues related to the patient care    Orders Placed:  No orders of the defined types were placed in this encounter. Medications Prescribed:  No orders of the defined types were placed in this encounter. Discussed use, benefit, and side effects of prescribed medications. All patient questions answered. Pt voicedunderstanding. Instructed to continue current medications, diet and exercise. Continue risk factor modification and medical management. Patient agreed with treatment plan. Follow up as directed.     Electronically signedby Betty Pereyra MD on 10/7/2021 at 9:12 AM

## 2021-10-14 ENCOUNTER — HOSPITAL ENCOUNTER (OUTPATIENT)
Dept: NON INVASIVE DIAGNOSTICS | Age: 86
Discharge: HOME | End: 2021-10-14
Payer: MEDICARE

## 2021-10-14 DIAGNOSIS — I10 PRIMARY HYPERTENSION: ICD-10-CM

## 2021-10-14 DIAGNOSIS — I25.810 CORONARY ARTERY DISEASE INVOLVING CORONARY BYPASS GRAFT OF NATIVE HEART WITHOUT ANGINA PECTORIS: ICD-10-CM

## 2021-10-14 DIAGNOSIS — R06.02 SHORTNESS OF BREATH: ICD-10-CM

## 2021-10-14 DIAGNOSIS — E78.01 FAMILIAL HYPERCHOLESTEROLEMIA: ICD-10-CM

## 2021-10-14 PROCEDURE — A9500 TC99M SESTAMIBI: HCPCS | Performed by: NUCLEAR MEDICINE

## 2021-10-14 PROCEDURE — 6360000002 HC RX W HCPCS

## 2021-10-14 PROCEDURE — 78452 HT MUSCLE IMAGE SPECT MULT: CPT

## 2021-10-14 PROCEDURE — 3430000000 HC RX DIAGNOSTIC RADIOPHARMACEUTICAL: Performed by: NUCLEAR MEDICINE

## 2021-10-14 PROCEDURE — 93017 CV STRESS TEST TRACING ONLY: CPT | Performed by: NUCLEAR MEDICINE

## 2021-10-14 RX ADMIN — Medication 33.6 MILLICURIE: at 15:35

## 2021-10-14 RX ADMIN — Medication 9.6 MILLICURIE: at 14:28

## 2021-10-15 PROCEDURE — 93018 CV STRESS TEST I&R ONLY: CPT | Performed by: NUCLEAR MEDICINE

## 2021-10-15 PROCEDURE — 93016 CV STRESS TEST SUPVJ ONLY: CPT | Performed by: NUCLEAR MEDICINE

## 2021-10-15 PROCEDURE — 78452 HT MUSCLE IMAGE SPECT MULT: CPT | Performed by: NUCLEAR MEDICINE

## 2021-11-03 DIAGNOSIS — N40.0 BENIGN PROSTATIC HYPERPLASIA WITHOUT LOWER URINARY TRACT SYMPTOMS: ICD-10-CM

## 2021-11-03 DIAGNOSIS — E03.4 HYPOTHYROIDISM DUE TO ACQUIRED ATROPHY OF THYROID: ICD-10-CM

## 2021-11-03 DIAGNOSIS — E11.9 TYPE 2 DIABETES MELLITUS WITHOUT COMPLICATION, WITHOUT LONG-TERM CURRENT USE OF INSULIN (HCC): ICD-10-CM

## 2021-11-04 NOTE — TELEPHONE ENCOUNTER
Date of last visit:  8/18/2021  Date of next visit:  11/23/2021    Requested Prescriptions     Pending Prescriptions Disp Refills    tamsulosin (FLOMAX) 0.4 MG capsule [Pharmacy Med Name: TAMSULOSIN CAP 0.4MG] 90 capsule 1     Sig: TAKE 1 CAPSULE DAILY    metFORMIN (GLUCOPHAGE) 500 MG tablet [Pharmacy Med Name: METFORMIN TAB 500MG] 270 tablet 1     Sig: TAKE 1 TABLET EVERY MORNINGAND TAKE 2 TABLETS EVERY   EVENING    levothyroxine (SYNTHROID) 25 MCG tablet [Pharmacy Med Name: LEVOTHYROXIN TAB 25MCG] 90 tablet 1     Sig: TAKE 1 TABLET DAILY

## 2021-11-05 RX ORDER — TAMSULOSIN HYDROCHLORIDE 0.4 MG/1
CAPSULE ORAL
Qty: 90 CAPSULE | Refills: 1 | Status: SHIPPED | OUTPATIENT
Start: 2021-11-05 | End: 2022-04-12

## 2021-11-05 RX ORDER — LEVOTHYROXINE SODIUM 0.03 MG/1
TABLET ORAL
Qty: 90 TABLET | Refills: 1 | Status: SHIPPED | OUTPATIENT
Start: 2021-11-05 | End: 2022-04-12

## 2021-11-23 ENCOUNTER — OFFICE VISIT (OUTPATIENT)
Dept: FAMILY MEDICINE CLINIC | Age: 86
End: 2021-11-23

## 2021-11-23 VITALS
RESPIRATION RATE: 16 BRPM | SYSTOLIC BLOOD PRESSURE: 102 MMHG | HEIGHT: 72 IN | HEART RATE: 80 BPM | DIASTOLIC BLOOD PRESSURE: 56 MMHG | BODY MASS INDEX: 24.31 KG/M2 | WEIGHT: 179.5 LBS

## 2021-11-23 DIAGNOSIS — Z23 NEED FOR INFLUENZA VACCINATION: ICD-10-CM

## 2021-11-23 DIAGNOSIS — E11.9 TYPE 2 DIABETES MELLITUS WITHOUT COMPLICATION, WITHOUT LONG-TERM CURRENT USE OF INSULIN (HCC): Primary | ICD-10-CM

## 2021-11-23 DIAGNOSIS — Z95.1 HX OF CABG: ICD-10-CM

## 2021-11-23 DIAGNOSIS — M48.062 LUMBAR STENOSIS WITH NEUROGENIC CLAUDICATION: ICD-10-CM

## 2021-11-23 DIAGNOSIS — I10 PRIMARY HYPERTENSION: ICD-10-CM

## 2021-11-23 DIAGNOSIS — E78.00 PURE HYPERCHOLESTEROLEMIA: ICD-10-CM

## 2021-11-23 DIAGNOSIS — K63.5 POLYP OF COLON, UNSPECIFIED PART OF COLON, UNSPECIFIED TYPE: ICD-10-CM

## 2021-11-23 DIAGNOSIS — I95.1 AUTONOMIC ORTHOSTATIC HYPOTENSION: ICD-10-CM

## 2021-11-23 LAB
CHP ED QC CHECK: ABNORMAL
GLUCOSE BLD-MCNC: 202 MG/DL
HBA1C MFR BLD: 6.8 %

## 2021-11-23 PROCEDURE — 90756 CCIIV4 VACC ABX FREE IM: CPT | Performed by: FAMILY MEDICINE

## 2021-11-23 PROCEDURE — 82962 GLUCOSE BLOOD TEST: CPT | Performed by: FAMILY MEDICINE

## 2021-11-23 PROCEDURE — 4040F PNEUMOC VAC/ADMIN/RCVD: CPT | Performed by: FAMILY MEDICINE

## 2021-11-23 PROCEDURE — G0008 ADMIN INFLUENZA VIRUS VAC: HCPCS | Performed by: FAMILY MEDICINE

## 2021-11-23 PROCEDURE — 83036 HEMOGLOBIN GLYCOSYLATED A1C: CPT | Performed by: FAMILY MEDICINE

## 2021-11-23 PROCEDURE — 1036F TOBACCO NON-USER: CPT | Performed by: FAMILY MEDICINE

## 2021-11-23 PROCEDURE — G8427 DOCREV CUR MEDS BY ELIG CLIN: HCPCS | Performed by: FAMILY MEDICINE

## 2021-11-23 PROCEDURE — G8420 CALC BMI NORM PARAMETERS: HCPCS | Performed by: FAMILY MEDICINE

## 2021-11-23 PROCEDURE — 1123F ACP DISCUSS/DSCN MKR DOCD: CPT | Performed by: FAMILY MEDICINE

## 2021-11-23 PROCEDURE — 99213 OFFICE O/P EST LOW 20 MIN: CPT | Performed by: FAMILY MEDICINE

## 2021-11-23 RX ORDER — SIMVASTATIN 10 MG
TABLET ORAL
Qty: 90 TABLET | Refills: 1 | Status: SHIPPED | OUTPATIENT
Start: 2021-11-23 | End: 2022-05-24

## 2021-11-23 ASSESSMENT — ENCOUNTER SYMPTOMS
ORTHOPNEA: 0
BACK PAIN: 0
EYE PAIN: 0
CHEST TIGHTNESS: 0
NAUSEA: 0
ABDOMINAL PAIN: 0
TROUBLE SWALLOWING: 0
COUGH: 0
BLOOD IN STOOL: 0
CONSTIPATION: 0
SORE THROAT: 0
SHORTNESS OF BREATH: 0

## 2021-11-23 NOTE — PROGRESS NOTES
Subjective:      Patient ID: Nam Gutierrez is a 81 y.o. male. thyroid  Stable       ashd  And  Stress  Test  Negative  In oct   2021    Lipids  stable     Diabetes  He presents for his follow-up diabetic visit. He has type 2 diabetes mellitus. His disease course has been stable. There are no hypoglycemic associated symptoms. Pertinent negatives for hypoglycemia include no dizziness or headaches. There are no diabetic associated symptoms. Pertinent negatives for diabetes include no chest pain, no fatigue and no weakness. There are no hypoglycemic complications. Symptoms are stable. There are no diabetic complications. He is compliant with treatment all of the time. His weight is stable. Meal planning includes avoidance of concentrated sweets. There is no change in his home blood glucose trend. An ACE inhibitor/angiotensin II receptor blocker is being taken. Hypertension  This is a chronic problem. The current episode started more than 1 year ago. The problem has been resolved since onset. The problem is controlled. Pertinent negatives include no chest pain, headaches, orthopnea, palpitations, peripheral edema or shortness of breath. The current treatment provides significant improvement. There are no compliance problems. Past Medical History:   Diagnosis Date    CAD (coronary artery disease)     Colon polyp     Hyperlipidemia     Hypertension     S/P CABG (coronary artery bypass graft)     Type II or unspecified type diabetes mellitus without mention of complication, not stated as uncontrolled        Review of Systems   Constitutional: Negative for fatigue and fever. HENT: Negative for congestion, ear pain, postnasal drip, sore throat and trouble swallowing. Eyes: Negative for pain. Respiratory: Negative for cough, chest tightness and shortness of breath. Cardiovascular: Negative for chest pain, palpitations, orthopnea and leg swelling.    Gastrointestinal: Negative for abdominal pain, blood in stool, constipation and nausea. Genitourinary: Negative for difficulty urinating, frequency and urgency. Musculoskeletal: Negative for arthralgias, back pain, joint swelling and neck stiffness. Skin: Negative for rash. Neurological: Negative for dizziness, weakness and headaches. Hematological: Negative for adenopathy. Does not bruise/bleed easily. Psychiatric/Behavioral: Negative for behavioral problems, dysphoric mood and sleep disturbance. BP (!) 102/56 (Site: Right Upper Arm, Position: Sitting, Cuff Size: Medium Adult)   Pulse 80   Resp 16   Ht 6' (1.829 m)   Wt 179 lb 8 oz (81.4 kg)   BMI 24.34 kg/m²   Objective:   Physical Exam  Vitals and nursing note reviewed. Constitutional:       Appearance: He is well-developed. HENT:      Head: Normocephalic and atraumatic. Right Ear: External ear normal.      Left Ear: External ear normal.      Nose: Nose normal.   Eyes:      Conjunctiva/sclera: Conjunctivae normal.      Pupils: Pupils are equal, round, and reactive to light. Comments: Fundi nl   Neck:      Thyroid: No thyromegaly. Cardiovascular:      Rate and Rhythm: Normal rate and regular rhythm. Heart sounds: Normal heart sounds. Pulmonary:      Effort: Pulmonary effort is normal.      Breath sounds: Normal breath sounds. No wheezing or rales. Abdominal:      General: Bowel sounds are normal.      Palpations: Abdomen is soft. There is no mass. Tenderness: There is no abdominal tenderness. Musculoskeletal:         General: Normal range of motion. Cervical back: Normal range of motion and neck supple. Lymphadenopathy:      Cervical: No cervical adenopathy. Skin:     General: Skin is warm and dry. Findings: No rash. Neurological:      Mental Status: He is alert and oriented to person, place, and time. Cranial Nerves: No cranial nerve deficit. Deep Tendon Reflexes: Reflexes are normal and symmetric. Assessment:      . Diagnosis Orders   1. Type 2 diabetes mellitus without complication, without long-term current use of insulin (HCC)  POCT Glucose    POCT glycosylated hemoglobin (Hb A1C)   2. Need for influenza vaccination  INFLUENZA, MDCK QUADV, 2 YRS AND OLDER, IM, MDV, 0.5ML (FLUCELVAX QUADV)   3. Pure hypercholesterolemia  simvastatin (ZOCOR) 10 MG tablet   4. Primary hypertension     5. Hx of CABG     6. Polyp of colon, unspecified part of colon, unspecified type     7. Lumbar stenosis with neurogenic claudication     8. Autonomic orthostatic hypotension           Plan:      Current Outpatient Medications   Medication Sig Dispense Refill    simvastatin (ZOCOR) 10 MG tablet TAKE 1 TABLET NIGHTLY (NEW LOWER DOSE) 90 tablet 1    tamsulosin (FLOMAX) 0.4 MG capsule TAKE 1 CAPSULE DAILY 90 capsule 1    metFORMIN (GLUCOPHAGE) 500 MG tablet TAKE 1 TABLET EVERY MORNINGAND TAKE 2 TABLETS EVERY   EVENING 270 tablet 1    levothyroxine (SYNTHROID) 25 MCG tablet TAKE 1 TABLET DAILY 90 tablet 1    glimepiride (AMARYL) 4 MG tablet TAKE 1/2 TABLET TWICE A DAY 90 tablet 1    metoprolol tartrate (LOPRESSOR) 25 MG tablet TAKE 1/2 TABLET TWICE A DAY 90 tablet 1    midodrine (PROAMATINE) 5 MG tablet Take 1.5 tablets PO in the AM, and 1 tablet at lunch and dinner 305 tablet 2    TRUEplus Lancets 33G MISC TEST BLOOD SUGARS DAILY      blood glucose test strips (TRUE METRIX BLOOD GLUCOSE TEST) strip Use to check blood sugar daily, Dx: E11.9 300 each 3    polyethylene glycol (MIRALAX) 17 GM/SCOOP powder Take 17 g by mouth daily 510 g 2    Famotidine (PEPCID PO) Take 20 mg by mouth as needed       acetaminophen (TYLENOL) 500 MG tablet Take 500 mg by mouth every 6 hours as needed for Pain      nitroGLYCERIN (NITROSTAT) 0.4 MG SL tablet Place 1 tablet under the tongue every 5 minutes as needed for Chest pain 25 tablet 5    Ascorbic Acid (VITAMIN C) 1000 MG tablet Take 1,000 mg by mouth daily.       aspirin EC 81 MG EC tablet Take 81 mg by mouth daily. No current facility-administered medications for this visit. Orders Placed This Encounter   Procedures    INFLUENZA, MDCK QUADV, 2 YRS AND OLDER, IM, MDV, 0.5ML (FLUCELVAX QUADV)    POCT Glucose    POCT glycosylated hemoglobin (Hb A1C)     Results for orders placed or performed in visit on 11/23/21   POCT Glucose   Result Value Ref Range    Glucose 202 (A) mg/dL    QC OK? POCT glycosylated hemoglobin (Hb A1C)   Result Value Ref Range    Hemoglobin A1C 6.8 (A) %     Ravi received counseling on the following healthy behaviors: nutrition and exercise    Patient given educational materials on Diabetes and Hyperlipidemia    I have instructed Eugenie Cornejo to complete a self tracking handout on Blood Sugars  and Blood Pressures  and instructed them to bring it with them to his next appointment. Discussed use, benefit, and side effects of prescribed medications. Barriers to medication compliance addressed. All patient questions answered. Pt voiced understanding.          Nazia Johnson MD

## 2022-01-20 DIAGNOSIS — I10 ESSENTIAL HYPERTENSION: ICD-10-CM

## 2022-01-20 DIAGNOSIS — E11.8 TYPE 2 DIABETES MELLITUS WITH COMPLICATION, WITHOUT LONG-TERM CURRENT USE OF INSULIN (HCC): ICD-10-CM

## 2022-01-21 RX ORDER — GLIMEPIRIDE 4 MG/1
TABLET ORAL
Qty: 90 TABLET | Refills: 1 | Status: SHIPPED | OUTPATIENT
Start: 2022-01-21 | End: 2022-07-12

## 2022-01-21 NOTE — TELEPHONE ENCOUNTER
Date of last visit:  11/23/2021  Date of next visit:  2/25/2022    Requested Prescriptions     Pending Prescriptions Disp Refills    metoprolol tartrate (LOPRESSOR) 25 MG tablet [Pharmacy Med Name: Odrachel Fitch TAR TAB 25MG] 90 tablet 1     Sig: TAKE 1/2 TABLET TWICE A DAY    glimepiride (AMARYL) 4 MG tablet [Pharmacy Med Name: GLIMEPIRIDE  TAB 4MG] 90 tablet 1     Sig: TAKE 1/2 TABLET TWICE A DAY

## 2022-03-01 ENCOUNTER — OFFICE VISIT (OUTPATIENT)
Dept: FAMILY MEDICINE CLINIC | Age: 87
End: 2022-03-01

## 2022-03-01 VITALS
TEMPERATURE: 97 F | DIASTOLIC BLOOD PRESSURE: 76 MMHG | RESPIRATION RATE: 16 BRPM | HEIGHT: 72 IN | BODY MASS INDEX: 24.79 KG/M2 | WEIGHT: 183 LBS | SYSTOLIC BLOOD PRESSURE: 126 MMHG | HEART RATE: 60 BPM

## 2022-03-01 DIAGNOSIS — E03.4 HYPOTHYROIDISM DUE TO ACQUIRED ATROPHY OF THYROID: ICD-10-CM

## 2022-03-01 DIAGNOSIS — Z95.1 HX OF CABG: ICD-10-CM

## 2022-03-01 DIAGNOSIS — I10 PRIMARY HYPERTENSION: ICD-10-CM

## 2022-03-01 DIAGNOSIS — E78.00 PURE HYPERCHOLESTEROLEMIA: ICD-10-CM

## 2022-03-01 DIAGNOSIS — I95.1 AUTONOMIC ORTHOSTATIC HYPOTENSION: ICD-10-CM

## 2022-03-01 DIAGNOSIS — I77.1 SUBCLAVIAN ARTERY STENOSIS (HCC): ICD-10-CM

## 2022-03-01 DIAGNOSIS — E11.9 TYPE 2 DIABETES MELLITUS WITHOUT COMPLICATION, WITHOUT LONG-TERM CURRENT USE OF INSULIN (HCC): Primary | ICD-10-CM

## 2022-03-01 DIAGNOSIS — R79.89 LOW TESTOSTERONE: ICD-10-CM

## 2022-03-01 DIAGNOSIS — N40.0 BENIGN PROSTATIC HYPERPLASIA WITHOUT LOWER URINARY TRACT SYMPTOMS: ICD-10-CM

## 2022-03-01 DIAGNOSIS — M48.062 LUMBAR STENOSIS WITH NEUROGENIC CLAUDICATION: ICD-10-CM

## 2022-03-01 DIAGNOSIS — Z00.00 MEDICARE ANNUAL WELLNESS VISIT, SUBSEQUENT: ICD-10-CM

## 2022-03-01 DIAGNOSIS — I25.10 CORONARY ARTERY DISEASE INVOLVING NATIVE CORONARY ARTERY OF NATIVE HEART WITHOUT ANGINA PECTORIS: ICD-10-CM

## 2022-03-01 LAB
CHP ED QC CHECK: ABNORMAL
GLUCOSE BLD-MCNC: 167 MG/DL
HBA1C MFR BLD: 6.9 %

## 2022-03-01 PROCEDURE — 1123F ACP DISCUSS/DSCN MKR DOCD: CPT | Performed by: FAMILY MEDICINE

## 2022-03-01 PROCEDURE — G8427 DOCREV CUR MEDS BY ELIG CLIN: HCPCS | Performed by: FAMILY MEDICINE

## 2022-03-01 PROCEDURE — 83036 HEMOGLOBIN GLYCOSYLATED A1C: CPT | Performed by: FAMILY MEDICINE

## 2022-03-01 PROCEDURE — G8420 CALC BMI NORM PARAMETERS: HCPCS | Performed by: FAMILY MEDICINE

## 2022-03-01 PROCEDURE — 82962 GLUCOSE BLOOD TEST: CPT | Performed by: FAMILY MEDICINE

## 2022-03-01 PROCEDURE — 99213 OFFICE O/P EST LOW 20 MIN: CPT | Performed by: FAMILY MEDICINE

## 2022-03-01 PROCEDURE — G0439 PPPS, SUBSEQ VISIT: HCPCS | Performed by: FAMILY MEDICINE

## 2022-03-01 PROCEDURE — 1036F TOBACCO NON-USER: CPT | Performed by: FAMILY MEDICINE

## 2022-03-01 ASSESSMENT — ENCOUNTER SYMPTOMS
NAUSEA: 0
CHEST TIGHTNESS: 0
SORE THROAT: 0
TROUBLE SWALLOWING: 0
EYE PAIN: 0
ABDOMINAL PAIN: 0
BACK PAIN: 1
SHORTNESS OF BREATH: 0
BLOOD IN STOOL: 0
CONSTIPATION: 0
COUGH: 0

## 2022-03-01 ASSESSMENT — PATIENT HEALTH QUESTIONNAIRE - PHQ9
1. LITTLE INTEREST OR PLEASURE IN DOING THINGS: 0
2. FEELING DOWN, DEPRESSED OR HOPELESS: 0
SUM OF ALL RESPONSES TO PHQ QUESTIONS 1-9: 0
SUM OF ALL RESPONSES TO PHQ QUESTIONS 1-9: 0
SUM OF ALL RESPONSES TO PHQ9 QUESTIONS 1 & 2: 0
SUM OF ALL RESPONSES TO PHQ QUESTIONS 1-9: 0
SUM OF ALL RESPONSES TO PHQ QUESTIONS 1-9: 0

## 2022-03-01 ASSESSMENT — LIFESTYLE VARIABLES: HOW OFTEN DO YOU HAVE A DRINK CONTAINING ALCOHOL: NEVER

## 2022-03-01 NOTE — PATIENT INSTRUCTIONS
Personalized Preventive Plan for Francesca White - 3/1/2022  Medicare offers a range of preventive health benefits. Some of the tests and screenings are paid in full while other may be subject to a deductible, co-insurance, and/or copay. Some of these benefits include a comprehensive review of your medical history including lifestyle, illnesses that may run in your family, and various assessments and screenings as appropriate. After reviewing your medical record and screening and assessments performed today your provider may have ordered immunizations, labs, imaging, and/or referrals for you. A list of these orders (if applicable) as well as your Preventive Care list are included within your After Visit Summary for your review. Other Preventive Recommendations:    · A preventive eye exam performed by an eye specialist is recommended every 1-2 years to screen for glaucoma; cataracts, macular degeneration, and other eye disorders. · A preventive dental visit is recommended every 6 months. · Try to get at least 150 minutes of exercise per week or 10,000 steps per day on a pedometer . · Order or download the FREE \"Exercise & Physical Activity: Your Everyday Guide\" from [employer]. Call 4-465.360.3978 or search [employer] online. · You need 3801-0075 mg of calcium and 1667-1440 IU of vitamin D per day. It is possible to meet your calcium requirement with diet alone, but a vitamin D supplement is usually necessary to meet this goal.  · When exposed to the sun, use a sunscreen that protects against both UVA and UVB radiation with an SPF of 30 or greater. Reapply every 2 to 3 hours or after sweating, drying off with a towel, or swimming. · Always wear a seat belt when traveling in a car. Always wear a helmet when riding a bicycle or motorcycle.

## 2022-03-01 NOTE — PROGRESS NOTES
Medicare Annual Wellness Visit    Anita Morrison is here for Medicare 120 Mika Cramer was seen today for medicare awv. Diagnoses and all orders for this visit:    Type 2 diabetes mellitus without complication, without long-term current use of insulin (HCC)  -     POCT Glucose  -     POCT glycosylated hemoglobin (Hb A1C)         Recommendations for Preventive Services Due: see orders and patient instructions/AVS.  Recommended screening schedule for the next 5-10 years is provided to the patient in written form: see Patient Instructions/AVS.     No follow-ups on file. Subjective       Patient's complete Health Risk Assessment and screening values have been reviewed and are found in Flowsheets. The following problems were reviewed today and where indicated follow up appointments were made and/or referrals ordered.     Positive Risk Factor Screenings with Interventions:    Fall Risk:  Do you feel unsteady or are you worried about falling? : (!) yes  2 or more falls in past year?: no  Fall with injury in past year?: no     Fall Risk Interventions:    · Home safety tips provided            General Health and ACP:  General  In general, how would you say your health is?: Very Good  In the past 7 days, have you experienced any of the following: New or Increased Pain, New or Increased Fatigue, Loneliness, Social Isolation, Stress or Anger?: (!) Yes  Select all that apply: (!) New or Increased Fatigue,Stress  Do you get the social and emotional support that you need?: Yes  Do you have a Living Will?: Yes    Advance Directives     Power of  Living Will ACP-Advance Directive ACP-Power of     Not on File Not on File Not on File Not on File      General Health Risk Interventions:  ·   fatigue and  stress  with  age  noted      Hearing/Vision:  Do you or your family notice any trouble with your hearing that hasn't been managed with hearing aids?: (!) Yes  Do you have difficulty driving, watching TV, or doing any of your daily activities because of your eyesight?: (!) Yes  Have you had an eye exam within the past year?: Yes  No exam data present    Hearing/Vision Interventions:  · Hearing concerns:    hearing  aides        Vision  Noted             Objective   Vitals:    03/01/22 1119   BP: 126/76   Site: Right Upper Arm   Position: Sitting   Cuff Size: Medium Adult   Pulse: 60   Resp: 16   Temp: 97 °F (36.1 °C)   TempSrc: Infrared   Weight: 183 lb (83 kg)   Height: 6' (1.829 m)      Body mass index is 24.82 kg/m². No Known Allergies       Prior to Visit Medications    Medication Sig Taking?  Authorizing Provider   metoprolol tartrate (LOPRESSOR) 25 MG tablet TAKE 1/2 TABLET TWICE A DAY Yes Sami Willard MD   glimepiride (AMARYL) 4 MG tablet TAKE 1/2 TABLET TWICE A DAY Yes Sami Willard MD   simvastatin (ZOCOR) 10 MG tablet TAKE 1 TABLET NIGHTLY (NEW LOWER DOSE) Yes Sami Willard MD   tamsulosin (FLOMAX) 0.4 MG capsule TAKE 1 CAPSULE DAILY Yes Sami Willard MD   metFORMIN (GLUCOPHAGE) 500 MG tablet TAKE 1 TABLET EVERY MORNINGAND TAKE 2 TABLETS EVERY   EVENING Yes Sami Willard MD   levothyroxine (SYNTHROID) 25 MCG tablet TAKE 1 TABLET DAILY Yes Sami Willard MD   midodrine (PROAMATINE) 5 MG tablet Take 1.5 tablets PO in the AM, and 1 tablet at lunch and dinner Yes Sami Willard MD   TRUEplus Lancets 33G MISC TEST BLOOD SUGARS DAILY Yes Historical Provider, MD   blood glucose test strips (TRUE METRIX BLOOD GLUCOSE TEST) strip Use to check blood sugar daily, Dx: E11.9 Yes Sami Willard MD   polyethylene glycol (MIRALAX) 17 GM/SCOOP powder Take 17 g by mouth daily Yes Sami Willard MD   Famotidine (PEPCID PO) Take 20 mg by mouth as needed  Yes Historical Provider, MD   acetaminophen (TYLENOL) 500 MG tablet Take 500 mg by mouth every 6 hours as needed for Pain Yes Historical Provider, MD   nitroGLYCERIN (NITROSTAT) 0.4 MG SL tablet Place 1 tablet under the tongue every 5 minutes as needed for Chest pain Yes Dilip Nelson MD   Ascorbic Acid (VITAMIN C) 1000 MG tablet Take 1,000 mg by mouth daily. Yes Historical Provider, MD   aspirin EC 81 MG EC tablet Take 81 mg by mouth daily. Yes Historical Provider, MD Ortiz (Including outside providers/suppliers regularly involved in providing care):   Patient Care Team:  Sharyn Ricks MD as PCP - General  Sharyn Ricks MD as PCP - [facility] Empaneled Provider    Reviewed and updated this visit:  Tobacco  Allergies  Meds  Med Hx  Surg Hx  Soc Hx  Fam Hx                        Urvashi Bryan (:  1935) is a 81 y.o. male,Established patient, here for evaluation of the following chief complaint(s):  Medicare AW         ASSESSMENT     Diagnosis Orders   1. Type 2 diabetes mellitus without complication, without long-term current use of insulin (HCC)  POCT Glucose    POCT glycosylated hemoglobin (Hb A1C)   2. Low testosterone     3. Subclavian artery stenosis (HCC)     4. Lumbar stenosis with neurogenic claudication     5. Hypothyroidism due to acquired atrophy of thyroid     6. Coronary artery disease involving native coronary artery of native heart without angina pectoris     7. Primary hypertension     8. Hx of CABG     9. Pure hypercholesterolemia     10.  Autonomic orthostatic hypotension           [facility]:  Current Outpatient Medications   Medication Sig Dispense Refill    metoprolol tartrate (LOPRESSOR) 25 MG tablet TAKE 1/2 TABLET TWICE A DAY 90 tablet 1    glimepiride (AMARYL) 4 MG tablet TAKE 1/2 TABLET TWICE A DAY 90 tablet 1    simvastatin (ZOCOR) 10 MG tablet TAKE 1 TABLET NIGHTLY (NEW LOWER DOSE) 90 tablet 1    tamsulosin (FLOMAX) 0.4 MG capsule TAKE 1 CAPSULE DAILY 90 capsule 1    metFORMIN (GLUCOPHAGE) 500 MG tablet TAKE 1 TABLET EVERY MORNINGAND TAKE 2 TABLETS EVERY   EVENING 270 tablet 1    levothyroxine (SYNTHROID) 25 MCG tablet TAKE 1 TABLET DAILY 90 tablet 1    midodrine (PROAMATINE) 5 MG tablet Take 1.5 tablets PO in the AM, and 1 tablet at lunch and dinner 305 tablet 2    TRUEplus Lancets 33G MISC TEST BLOOD SUGARS DAILY      blood glucose test strips (TRUE METRIX BLOOD GLUCOSE TEST) strip Use to check blood sugar daily, Dx: E11.9 300 each 3    polyethylene glycol (MIRALAX) 17 GM/SCOOP powder Take 17 g by mouth daily 510 g 2    Famotidine (PEPCID PO) Take 20 mg by mouth as needed       acetaminophen (TYLENOL) 500 MG tablet Take 500 mg by mouth every 6 hours as needed for Pain      nitroGLYCERIN (NITROSTAT) 0.4 MG SL tablet Place 1 tablet under the tongue every 5 minutes as needed for Chest pain 25 tablet 5    Ascorbic Acid (VITAMIN C) 1000 MG tablet Take 1,000 mg by mouth daily.  aspirin EC 81 MG EC tablet Take 81 mg by mouth daily. No current facility-administered medications for this visit. Orders Placed This Encounter   Procedures    Lipid Panel     Standing Status:   Future     Standing Expiration Date:   3/1/2023     Order Specific Question:   Is Patient Fasting?/# of Hours     Answer:   YES 12 HOURS    Comprehensive Metabolic Panel     Standing Status:   Future     Standing Expiration Date:   3/1/2023    T4, Free     Standing Status:   Future     Standing Expiration Date:   3/1/2023    TSH     Standing Status:   Future     Standing Expiration Date:   3/1/2023    CBC with Auto Differential     Standing Status:   Future     Standing Expiration Date:   3/1/2023    PSA, Prostatic Specific Antigen     Standing Status:   Future     Standing Expiration Date:   3/1/2023    POCT Glucose    POCT glycosylated hemoglobin (Hb A1C)       Subjective   SUBJECTIVE/OBJECTIVE:  HPI     htn  Stable     Niddm  stable       ashd  Stable      lipid  Stable       Thyroid  Stable        Lumbar   stenosis   Stable         Review of Systems   Constitutional: Negative for fatigue and fever.    HENT: Negative for congestion, ear pain, postnasal drip, sore throat and trouble swallowing. Eyes: Negative for pain. Respiratory: Negative for cough, chest tightness and shortness of breath. Cardiovascular: Negative for chest pain, palpitations and leg swelling. Gastrointestinal: Negative for abdominal pain, blood in stool, constipation and nausea. Genitourinary: Negative for difficulty urinating, frequency and urgency. Musculoskeletal: Positive for back pain. Negative for arthralgias, joint swelling and neck stiffness. Skin: Negative for rash. Neurological: Negative for dizziness, weakness and headaches. Hematological: Negative for adenopathy. Does not bruise/bleed easily. Psychiatric/Behavioral: Negative for behavioral problems, dysphoric mood and sleep disturbance. /76 (Site: Right Upper Arm, Position: Sitting, Cuff Size: Medium Adult)   Pulse 60   Temp 97 °F (36.1 °C) (Infrared)   Resp 16   Ht 6' (1.829 m)   Wt 183 lb (83 kg)   BMI 24.82 kg/m²   Objective   Physical Exam  Vitals and nursing note reviewed. Constitutional:       Appearance: He is well-developed. HENT:      Head: Normocephalic and atraumatic. Right Ear: External ear normal.      Left Ear: External ear normal.      Nose: Nose normal.   Eyes:      Conjunctiva/sclera: Conjunctivae normal.      Pupils: Pupils are equal, round, and reactive to light. Comments: Fundi nl   Neck:      Thyroid: No thyromegaly. Cardiovascular:      Rate and Rhythm: Normal rate and regular rhythm. Heart sounds: Normal heart sounds. Pulmonary:      Effort: Pulmonary effort is normal.      Breath sounds: Normal breath sounds. No wheezing or rales. Abdominal:      General: Bowel sounds are normal.      Palpations: Abdomen is soft. There is no mass. Tenderness: There is no abdominal tenderness. Musculoskeletal:         General: Normal range of motion. Cervical back: Normal range of motion and neck supple. Lymphadenopathy:      Cervical: No cervical adenopathy. Skin:     General: Skin is warm and dry. Findings: No rash. Neurological:      Mental Status: He is alert and oriented to person, place, and time. Cranial Nerves: No cranial nerve deficit. Deep Tendon Reflexes: Reflexes are normal and symmetric. An electronic signature was used to authenticate this note.     --Beatris Georges MD

## 2022-03-21 ENCOUNTER — OFFICE VISIT (OUTPATIENT)
Dept: INTERNAL MEDICINE CLINIC | Age: 87
End: 2022-03-21
Payer: MEDICARE

## 2022-03-21 VITALS
HEART RATE: 82 BPM | SYSTOLIC BLOOD PRESSURE: 140 MMHG | DIASTOLIC BLOOD PRESSURE: 60 MMHG | TEMPERATURE: 97.5 F | WEIGHT: 180.8 LBS | BODY MASS INDEX: 24.49 KG/M2 | HEIGHT: 72 IN

## 2022-03-21 DIAGNOSIS — E11.9 TYPE 2 DIABETES MELLITUS WITHOUT COMPLICATION, WITHOUT LONG-TERM CURRENT USE OF INSULIN (HCC): ICD-10-CM

## 2022-03-21 DIAGNOSIS — R06.02 SOB (SHORTNESS OF BREATH) ON EXERTION: Primary | ICD-10-CM

## 2022-03-21 DIAGNOSIS — E03.4 HYPOTHYROIDISM DUE TO ACQUIRED ATROPHY OF THYROID: ICD-10-CM

## 2022-03-21 DIAGNOSIS — Z95.1 S/P CABG (CORONARY ARTERY BYPASS GRAFT): ICD-10-CM

## 2022-03-21 DIAGNOSIS — I71.20 THORACIC AORTIC ANEURYSM WITHOUT RUPTURE: ICD-10-CM

## 2022-03-21 DIAGNOSIS — I95.1 AUTONOMIC ORTHOSTATIC HYPOTENSION: ICD-10-CM

## 2022-03-21 PROCEDURE — G8427 DOCREV CUR MEDS BY ELIG CLIN: HCPCS | Performed by: INTERNAL MEDICINE

## 2022-03-21 PROCEDURE — 3044F HG A1C LEVEL LT 7.0%: CPT | Performed by: INTERNAL MEDICINE

## 2022-03-21 PROCEDURE — 99213 OFFICE O/P EST LOW 20 MIN: CPT | Performed by: INTERNAL MEDICINE

## 2022-03-21 PROCEDURE — 1123F ACP DISCUSS/DSCN MKR DOCD: CPT | Performed by: INTERNAL MEDICINE

## 2022-03-21 PROCEDURE — 4040F PNEUMOC VAC/ADMIN/RCVD: CPT | Performed by: INTERNAL MEDICINE

## 2022-03-21 PROCEDURE — G8420 CALC BMI NORM PARAMETERS: HCPCS | Performed by: INTERNAL MEDICINE

## 2022-03-21 PROCEDURE — G8482 FLU IMMUNIZE ORDER/ADMIN: HCPCS | Performed by: INTERNAL MEDICINE

## 2022-03-21 PROCEDURE — 1036F TOBACCO NON-USER: CPT | Performed by: INTERNAL MEDICINE

## 2022-03-21 RX ORDER — MIDODRINE HYDROCHLORIDE 5 MG/1
TABLET ORAL
Qty: 315 TABLET | Refills: 3 | Status: SHIPPED | OUTPATIENT
Start: 2022-03-21 | End: 2022-04-07

## 2022-03-21 NOTE — PROGRESS NOTES
Kameron Song (:  1935) is a 81 y.o. male,Established patient, here for evaluation of the following chief complaint(s):  Hypertension, Hypothyroidism, Coronary Artery Disease (s/p cabg), Diabetes (pcp follows), Dizziness (gets lightheaded from time to time), and Shortness of Breath (has stress test last fall)         ASSESSMENT/PLAN:  1. SOB (shortness of breath) on exertion- currently denies any sxs; had echo, bnp, stress last yr; all nl  2. Autonomic orthostatic hypotension  -     midodrine (PROAMATINE) 5 MG tablet; Take 1.5 tablets PO in the AM, and 1 tablet at lunch and dinner, Disp-315 tablet, R-3Normal  3. Hypothyroidism due to acquired atrophy of thyroid  -     TSH with Reflex; Future  4. Type 2 diabetes mellitus without complication, without long-term current use of insulin ([facility] [address]-. A1C 6.9; pt does not check sugars t home  5. S/P CABG (coronary artery bypass graft)- last cath patent grafts except one. 6. Thoracic aortic aneurysm- discussed with pt; follow up later      Return in about 6 months (around 2022). Subjective   SUBJECTIVE/OBJECTIVE:  HPI pt with cad, cabg, dm seen in f/u. No new sxs. Wife says he walks outside without sxs. No falls. Sugars ok. Denies chest pain. To see Dr Marixa Power in April. Review of Systems    Twelve point ROS completed and found to be negative except as noted above.       Objective   Physical Exam    BP (!) 140/60 (Site: Left Upper Arm)   Pulse 82   Temp 97.5 °F (36.4 °C)   Ht 6' 0.01\" (1.829 m)   Wt 180 lb 12.8 oz (82 kg)   BMI 24.52 kg/m²   General appearance - chronically ill appearing    Mental Status - alert, oriented to person, place, and time          Neck - supple, no significant adenopathy        Chest - CRACKLES BASES     Heart - normal rate, regular rhythm, normal S1, S2, no murmurs, rubs, clicks or gallops     Abdomen - soft, nontender, nondistended, no masses or organomegaly         Neurological - alert, oriented, normal speech, no focal findings or movement disorder noted       Extremities - no pedal edema noted     Skin - normal coloration and turgor, no rashes, no suspicious skin lesions noted            An electronic signature was used to authenticate this note.     --Lesa Zhang MD

## 2022-04-07 ENCOUNTER — OFFICE VISIT (OUTPATIENT)
Dept: CARDIOLOGY CLINIC | Age: 87
End: 2022-04-07
Payer: MEDICARE

## 2022-04-07 VITALS — SYSTOLIC BLOOD PRESSURE: 105 MMHG | DIASTOLIC BLOOD PRESSURE: 69 MMHG | HEART RATE: 85 BPM

## 2022-04-07 DIAGNOSIS — I95.0 IDIOPATHIC HYPOTENSION: Primary | ICD-10-CM

## 2022-04-07 DIAGNOSIS — I95.1 AUTONOMIC ORTHOSTATIC HYPOTENSION: ICD-10-CM

## 2022-04-07 DIAGNOSIS — I25.810 CORONARY ARTERY DISEASE INVOLVING CORONARY BYPASS GRAFT OF NATIVE HEART WITHOUT ANGINA PECTORIS: ICD-10-CM

## 2022-04-07 DIAGNOSIS — I10 PRIMARY HYPERTENSION: ICD-10-CM

## 2022-04-07 PROCEDURE — G8420 CALC BMI NORM PARAMETERS: HCPCS | Performed by: NUCLEAR MEDICINE

## 2022-04-07 PROCEDURE — 4040F PNEUMOC VAC/ADMIN/RCVD: CPT | Performed by: NUCLEAR MEDICINE

## 2022-04-07 PROCEDURE — G8427 DOCREV CUR MEDS BY ELIG CLIN: HCPCS | Performed by: NUCLEAR MEDICINE

## 2022-04-07 PROCEDURE — 1036F TOBACCO NON-USER: CPT | Performed by: NUCLEAR MEDICINE

## 2022-04-07 PROCEDURE — 1123F ACP DISCUSS/DSCN MKR DOCD: CPT | Performed by: NUCLEAR MEDICINE

## 2022-04-07 PROCEDURE — 99214 OFFICE O/P EST MOD 30 MIN: CPT | Performed by: NUCLEAR MEDICINE

## 2022-04-07 RX ORDER — MIDODRINE HYDROCHLORIDE 10 MG/1
10 TABLET ORAL 3 TIMES DAILY
Qty: 90 TABLET | Refills: 11 | Status: SHIPPED | OUTPATIENT
Start: 2022-04-07

## 2022-04-07 RX ORDER — MIDODRINE HYDROCHLORIDE 10 MG/1
10 TABLET ORAL 3 TIMES DAILY
COMMUNITY
End: 2022-04-07 | Stop reason: SDUPTHER

## 2022-04-07 NOTE — PROGRESS NOTES
[id number] [address] [employer] .  [address] [place] [address]  Dept: [phone]  Dept Fax: [phone]  Loc: [phone]    Visit Date: 4/7/2022    Nya Hyde is a 81 y.o. male who presents todayfor:  Chief Complaint   Patient presents with    Check-Up    Coronary Artery Disease    Hypotension    Hyperlipidemia   know CABG 2005  Some aches and pains  Stress test was okay   No changes clinically   Baseline dyspnea  Very very limited  Severe orthostatic hypotension   Known DM   Fair control  Not followed by him  On statins for hyperlipidemia  On midodrine for his low BP      HPI:  HPI  Past Medical History:   Diagnosis Date    CAD (coronary artery disease)     Colon polyp     Hyperlipidemia     Hypertension     S/P CABG (coronary artery bypass graft)     Type II or unspecified type diabetes mellitus without mention of complication, not stated as uncontrolled       Past Surgical History:   Procedure Laterality Date    CARPAL TUNNEL RELEASE  1995 and 1995    left and  right     COLONOSCOPY  2009    dr. Manzanares No and due 2014    CORONARY ARTERY BYPASS GRAFT  2005   [address] [facility]  11/2019      L4  and  L5   [facility]    Dr Lina Agarwal @ [facility]      Family History   Problem Relation Age of Onset    Heart Disease Father     Heart Disease Mother      Social History     Tobacco Use    Smoking status: Never Smoker    Smokeless tobacco: Never Used   Substance Use Topics    Alcohol use:  Yes     Alcohol/week: 0.0 standard drinks     Comment: very rare      Current Outpatient Medications   Medication Sig Dispense Refill    midodrine (PROAMATINE) 5 MG tablet Take 1.5 tablets PO in the AM, and 1 tablet at lunch and dinner 315 tablet 3    metoprolol tartrate (LOPRESSOR) 25 MG tablet TAKE 1/2 TABLET TWICE A DAY 90 tablet 1    glimepiride (AMARYL) 4 MG tablet TAKE 1/2 TABLET TWICE A DAY 90 tablet 1    simvastatin (ZOCOR) 10 MG tablet TAKE 1 TABLET NIGHTLY (NEW LOWER DOSE) 90 tablet 1    tamsulosin (FLOMAX) 0.4 MG capsule TAKE 1 CAPSULE DAILY 90 capsule 1    metFORMIN (GLUCOPHAGE) 500 MG tablet TAKE 1 TABLET EVERY MORNINGAND TAKE 2 TABLETS EVERY   EVENING 270 tablet 1    levothyroxine (SYNTHROID) 25 MCG tablet TAKE 1 TABLET DAILY 90 tablet 1    TRUEplus Lancets 33G MISC TEST BLOOD SUGARS DAILY      blood glucose test strips (TRUE METRIX BLOOD GLUCOSE TEST) strip Use to check blood sugar daily, Dx: E11.9 300 each 3    polyethylene glycol (MIRALAX) 17 GM/SCOOP powder Take 17 g by mouth daily 510 g 2    Famotidine (PEPCID PO) Take 20 mg by mouth as needed       acetaminophen (TYLENOL) 500 MG tablet Take 500 mg by mouth every 6 hours as needed for Pain      nitroGLYCERIN (NITROSTAT) 0.4 MG SL tablet Place 1 tablet under the tongue every 5 minutes as needed for Chest pain 25 tablet 5    Ascorbic Acid (VITAMIN C) 1000 MG tablet Take 1,000 mg by mouth daily.  aspirin EC 81 MG EC tablet Take 81 mg by mouth daily. No current facility-administered medications for this visit.      No Known Allergies  Health Maintenance   Topic Date Due    Shingles Vaccine (2 of 3) 01/18/2016    PSA counseling  05/06/2022    Lipid screen  05/06/2022    Depression Screen  03/01/2023    Annual Wellness Visit (AWV)  03/02/2023    DTaP/Tdap/Td vaccine (2 - Td or Tdap) 03/06/2029    Flu vaccine  Completed    Pneumococcal 65+ years Vaccine  Completed    COVID-19 Vaccine  Completed    Hepatitis A vaccine  Aged Out    Hib vaccine  Aged Out    Meningococcal (ACWY) vaccine  Aged Out       Subjective:  Review of Systems  General:   No fever, no chills, No fatigue or weight loss  Pulmonary:    some baseline dyspnea, no wheezing  Cardiac:    Denies recent chest pain,   GI:     No nausea or vomiting, no abdominal pain  Neuro:     some dizziness or light headedness,   Musculoskeletal:  No recent active issues  Extremities:   No edema, no obvious claudication       Objective:  Physical Exam  /69   Pulse 85   General:   Well developed, well nourished  Lungs:   Clear to auscultation  Heart:    Normal S1 S2, Slight murmur. no rubs, no gallops  Abdomen:   Soft, non tender, no organomegalies, positive bowel sounds  Extremities:   No edema, no cyanosis, good peripheral pulses  Neurological:   Awake, alert, oriented. No obvious focal deficits  Musculoskelatal:  No obvious deformities    Assessment:      Diagnosis Orders   1. Idiopathic hypotension     2. Primary hypertension     3. Coronary artery disease involving coronary bypass graft of native heart without angina pectoris     issues with his bp and balance   Low BP yet   Very deconditioned       Plan:  No follow-ups on file. As above  Consider lower dose metoprolol  Vs higher dose midodrine   Consider support hose   Continue risk factor modification and medical management'  ,Thank you for allowing me to participate in the care of your patient. Please don't hesitate to contact me regarding any further issues related to the patient care    Orders Placed:  No orders of the defined types were placed in this encounter. Medications Prescribed:  No orders of the defined types were placed in this encounter. Discussed use, benefit, and side effects of prescribed medications. All patient questions answered. Pt voicedunderstanding. Instructed to continue current medications, diet and exercise. Continue risk factor modification and medical management. Patient agreed with treatment plan. Follow up as directed.     Electronically signedby Mable Rendon MD on 4/7/2022 at 12:33 PM

## 2022-04-08 DIAGNOSIS — E03.4 HYPOTHYROIDISM DUE TO ACQUIRED ATROPHY OF THYROID: ICD-10-CM

## 2022-04-08 DIAGNOSIS — N40.0 BENIGN PROSTATIC HYPERPLASIA WITHOUT LOWER URINARY TRACT SYMPTOMS: ICD-10-CM

## 2022-04-08 DIAGNOSIS — E11.9 TYPE 2 DIABETES MELLITUS WITHOUT COMPLICATION, WITHOUT LONG-TERM CURRENT USE OF INSULIN (HCC): ICD-10-CM

## 2022-04-11 ENCOUNTER — NURSE ONLY (OUTPATIENT)
Dept: LAB | Age: 87
End: 2022-04-11

## 2022-04-11 ENCOUNTER — TELEPHONE (OUTPATIENT)
Dept: INTERNAL MEDICINE CLINIC | Age: 87
End: 2022-04-11

## 2022-04-11 DIAGNOSIS — E03.4 HYPOTHYROIDISM DUE TO ACQUIRED ATROPHY OF THYROID: ICD-10-CM

## 2022-04-11 LAB — TSH SERPL DL<=0.05 MIU/L-ACNC: 3.78 UIU/ML (ref 0.4–4.2)

## 2022-04-11 NOTE — TELEPHONE ENCOUNTER
I notified pt's wife that TSH is OK and should stay on medication as is. She verbalizes understanding.

## 2022-04-11 NOTE — TELEPHONE ENCOUNTER
Date of last visit:  3/1/2022  Date of next visit:  6/3/2022    Requested Prescriptions     Pending Prescriptions Disp Refills    tamsulosin (FLOMAX) 0.4 MG capsule [Pharmacy Med Name: TAMSULOSIN CAP 0.4MG] 90 capsule 1     Sig: TAKE 1 CAPSULE DAILY    metFORMIN (GLUCOPHAGE) 500 MG tablet [Pharmacy Med Name: METFORMIN TAB 500MG] 270 tablet 1     Sig: TAKE 1 TABLET EVERY MORNINGAND TAKE 2 TABLETS EVERY   EVENING    levothyroxine (SYNTHROID) 25 MCG tablet [Pharmacy Med Name: LEVOTHYROXIN TAB 25MCG] 90 tablet 1     Sig: TAKE 1 TABLET DAILY

## 2022-04-12 RX ORDER — LEVOTHYROXINE SODIUM 0.03 MG/1
TABLET ORAL
Qty: 90 TABLET | Refills: 1 | Status: SHIPPED | OUTPATIENT
Start: 2022-04-12 | End: 2022-10-14

## 2022-04-12 RX ORDER — TAMSULOSIN HYDROCHLORIDE 0.4 MG/1
CAPSULE ORAL
Qty: 90 CAPSULE | Refills: 1 | Status: SHIPPED | OUTPATIENT
Start: 2022-04-12 | End: 2022-09-30

## 2022-05-23 DIAGNOSIS — E78.00 PURE HYPERCHOLESTEROLEMIA: ICD-10-CM

## 2022-05-23 NOTE — TELEPHONE ENCOUNTER
Date of last visit:  3/1/2022  Date of next visit:  6/3/2022    Requested Prescriptions     Pending Prescriptions Disp Refills    simvastatin (ZOCOR) 10 MG tablet [Pharmacy Med Name: SIMVASTATIN 10 MG TABLET] 90 tablet 1     Sig: take 1 tablet by mouth every evening

## 2022-05-24 RX ORDER — SIMVASTATIN 10 MG
TABLET ORAL
Qty: 90 TABLET | Refills: 2 | Status: SHIPPED | OUTPATIENT
Start: 2022-05-24

## 2022-06-03 ENCOUNTER — OFFICE VISIT (OUTPATIENT)
Dept: FAMILY MEDICINE CLINIC | Age: 87
End: 2022-06-03

## 2022-06-03 VITALS
RESPIRATION RATE: 16 BRPM | SYSTOLIC BLOOD PRESSURE: 132 MMHG | WEIGHT: 180 LBS | HEIGHT: 72 IN | BODY MASS INDEX: 24.38 KG/M2 | DIASTOLIC BLOOD PRESSURE: 60 MMHG | HEART RATE: 70 BPM

## 2022-06-03 DIAGNOSIS — I95.1 AUTONOMIC ORTHOSTATIC HYPOTENSION: ICD-10-CM

## 2022-06-03 DIAGNOSIS — I77.1 SUBCLAVIAN ARTERY STENOSIS (HCC): ICD-10-CM

## 2022-06-03 DIAGNOSIS — I10 PRIMARY HYPERTENSION: ICD-10-CM

## 2022-06-03 DIAGNOSIS — E03.4 HYPOTHYROIDISM DUE TO ACQUIRED ATROPHY OF THYROID: ICD-10-CM

## 2022-06-03 DIAGNOSIS — E11.9 TYPE 2 DIABETES MELLITUS WITHOUT COMPLICATION, WITHOUT LONG-TERM CURRENT USE OF INSULIN (HCC): Primary | ICD-10-CM

## 2022-06-03 DIAGNOSIS — I25.10 CORONARY ARTERY DISEASE INVOLVING NATIVE CORONARY ARTERY OF NATIVE HEART WITHOUT ANGINA PECTORIS: ICD-10-CM

## 2022-06-03 DIAGNOSIS — E78.00 PURE HYPERCHOLESTEROLEMIA: ICD-10-CM

## 2022-06-03 DIAGNOSIS — M48.062 LUMBAR STENOSIS WITH NEUROGENIC CLAUDICATION: ICD-10-CM

## 2022-06-03 LAB
CHP ED QC CHECK: ABNORMAL
CREATININE URINE POCT: 10
GLUCOSE BLD-MCNC: 142 MG/DL
HBA1C MFR BLD: 6.7 %
MICROALBUMIN/CREAT 24H UR: 100 MG/G{CREAT}
MICROALBUMIN/CREAT UR-RTO: ABNORMAL

## 2022-06-03 PROCEDURE — 1123F ACP DISCUSS/DSCN MKR DOCD: CPT | Performed by: FAMILY MEDICINE

## 2022-06-03 PROCEDURE — G8420 CALC BMI NORM PARAMETERS: HCPCS | Performed by: FAMILY MEDICINE

## 2022-06-03 PROCEDURE — 82044 UR ALBUMIN SEMIQUANTITATIVE: CPT | Performed by: FAMILY MEDICINE

## 2022-06-03 PROCEDURE — 83036 HEMOGLOBIN GLYCOSYLATED A1C: CPT | Performed by: FAMILY MEDICINE

## 2022-06-03 PROCEDURE — G8427 DOCREV CUR MEDS BY ELIG CLIN: HCPCS | Performed by: FAMILY MEDICINE

## 2022-06-03 PROCEDURE — 1036F TOBACCO NON-USER: CPT | Performed by: FAMILY MEDICINE

## 2022-06-03 PROCEDURE — 99213 OFFICE O/P EST LOW 20 MIN: CPT | Performed by: FAMILY MEDICINE

## 2022-06-03 PROCEDURE — 82962 GLUCOSE BLOOD TEST: CPT | Performed by: FAMILY MEDICINE

## 2022-06-03 ASSESSMENT — ENCOUNTER SYMPTOMS
BACK PAIN: 0
CONSTIPATION: 0
SHORTNESS OF BREATH: 0
EYE PAIN: 0
ABDOMINAL PAIN: 0
CHEST TIGHTNESS: 0
COUGH: 0
BLOOD IN STOOL: 0
NAUSEA: 0
SORE THROAT: 0
TROUBLE SWALLOWING: 0

## 2022-06-03 NOTE — PROGRESS NOTES
No components found for: CHLPL  Lab Results   Component Value Date    TRIG 98 05/06/2021     Lab Results   Component Value Date    HDL 46 05/06/2021     Lab Results   Component Value Date    LDLCALC 58 05/06/2021     No results found for: LABVLDL    Lab Results   Component Value Date    ALT 12 05/06/2021    AST 14 05/06/2021    ALKPHOS 79 05/06/2021    BILITOT 0.9 05/06/2021           Is patient currently taking any cholesterol medications? Yes   If yes, see med list as above    Is the patient reporting any side effects of cholesterol medications? No    Is the patient taking any over the counter medications? Yes   If yes, see med list as above    Is the patient taking a daily aspirin? Yes      Patient Self-Management Goal for Chronic Condition  Goal: I will take all medications as prescribed by my doctor, and I will call the office if I am having any medication problems. Barriers to success: none  Plan for overcoming my barriers: N/A     Confidence: 10/10  Date goal set: 6/3/22  Date goal attained:     Have you seen any other physician or provider since your last visit no    Have you had any other diagnostic tests since your last visit? no    Have you changed or stopped any medications since your last visit including any over-the-counter medicines, vitamins, or herbal medicines? no     Are you taking all your prescribed medications?  Yes    If NO, why?

## 2022-06-03 NOTE — PROGRESS NOTES
Subjective:      Patient ID: Sebastian Kerr is a 81 y.o. male. Lightheaded and  Mild   Ataxia      Thyroid  Stable       Autonomic  orthostic  dysfunction  stable                 Diabetes  He presents for his follow-up diabetic visit. He has type 2 diabetes mellitus. His disease course has been stable. There are no hypoglycemic associated symptoms. Pertinent negatives for hypoglycemia include no dizziness or headaches. There are no diabetic associated symptoms. Pertinent negatives for diabetes include no chest pain, no fatigue and no weakness. There are no hypoglycemic complications. Symptoms are stable. He is compliant with treatment all of the time. His weight is stable. He is following a diabetic diet. Meal planning includes avoidance of concentrated sweets. There is no change in his home blood glucose trend. His breakfast blood glucose is taken between 7-8 am. His breakfast blood glucose range is generally 110-130 mg/dl. An ACE inhibitor/angiotensin II receptor blocker is being taken. Hypertension  This is a chronic problem. The current episode started more than 1 year ago. The problem has been resolved since onset. The problem is controlled. Pertinent negatives include no chest pain, headaches, palpitations or shortness of breath. Past treatments include nothing. The current treatment provides significant improvement. There are no compliance problems. Past Medical History:   Diagnosis Date    CAD (coronary artery disease)     Colon polyp     Hyperlipidemia     Hypertension     S/P CABG (coronary artery bypass graft)     Type II or unspecified type diabetes mellitus without mention of complication, not stated as uncontrolled        Review of Systems   Constitutional: Negative for fatigue and fever. HENT: Negative for congestion, ear pain, postnasal drip, sore throat and trouble swallowing. Eyes: Negative for pain. Respiratory: Negative for cough, chest tightness and shortness of breath. Cardiovascular: Negative for chest pain, palpitations and leg swelling. Gastrointestinal: Negative for abdominal pain, blood in stool, constipation and nausea. Genitourinary: Negative for difficulty urinating, frequency and urgency. Musculoskeletal: Negative for arthralgias, back pain, joint swelling and neck stiffness. Skin: Negative for rash. Neurological: Negative for dizziness, weakness and headaches. Hematological: Negative for adenopathy. Does not bruise/bleed easily. Psychiatric/Behavioral: Negative for behavioral problems, dysphoric mood and sleep disturbance. /60 (Site: Left Upper Arm, Position: Sitting, Cuff Size: Medium Adult)   Pulse 70   Resp 16   Ht 6' (1.829 m)   Wt 180 lb (81.6 kg)   BMI 24.41 kg/m²   Objective:   Physical Exam  Vitals and nursing note reviewed. Constitutional:       Appearance: He is well-developed. HENT:      Head: Normocephalic and atraumatic. Right Ear: External ear normal.      Left Ear: External ear normal.      Nose: Nose normal.   Eyes:      Conjunctiva/sclera: Conjunctivae normal.      Pupils: Pupils are equal, round, and reactive to light. Comments: Fundi nl   Neck:      Thyroid: No thyromegaly. Cardiovascular:      Rate and Rhythm: Normal rate and regular rhythm. Heart sounds: Normal heart sounds. Pulmonary:      Effort: Pulmonary effort is normal.      Breath sounds: Normal breath sounds. No wheezing or rales. Abdominal:      General: Bowel sounds are normal.      Palpations: Abdomen is soft. There is no mass. Tenderness: There is no abdominal tenderness. Musculoskeletal:         General: Normal range of motion. Cervical back: Normal range of motion and neck supple. Lymphadenopathy:      Cervical: No cervical adenopathy. Skin:     General: Skin is warm and dry. Findings: No rash. Neurological:      Mental Status: He is alert and oriented to person, place, and time. Cranial Nerves:  No cranial nerve deficit. Deep Tendon Reflexes: Reflexes are normal and symmetric. Assessment:       Diagnosis Orders   1. Type 2 diabetes mellitus without complication, without long-term current use of insulin ([facility])  POCT Glucose    POCT glycosylated hemoglobin (Hb A1C)   2. Autonomic orthostatic hypotension     3. Lumbar stenosis with neurogenic claudication     4. Coronary artery disease involving native coronary artery of native heart without angina pectoris     5. Pure hypercholesterolemia     6. Primary hypertension     7. Hypothyroidism due to acquired atrophy of thyroid     8.  Subclavian artery stenosis (HCC)           Plan:      Current Outpatient Medications   Medication Sig Dispense Refill    simvastatin (ZOCOR) 10 MG tablet take 1 tablet by mouth every evening 90 tablet 2    tamsulosin (FLOMAX) 0.4 MG capsule TAKE 1 CAPSULE DAILY 90 capsule 1    metFORMIN (GLUCOPHAGE) 500 MG tablet TAKE 1 TABLET EVERY MORNINGAND TAKE 2 TABLETS EVERY   EVENING 270 tablet 1    levothyroxine (SYNTHROID) 25 MCG tablet TAKE 1 TABLET DAILY 90 tablet 1    midodrine (PROAMATINE) 10 MG tablet Take 1 tablet by mouth 3 times daily 90 tablet 11    metoprolol tartrate (LOPRESSOR) 25 MG tablet TAKE 1/2 TABLET TWICE A DAY 90 tablet 1    glimepiride (AMARYL) 4 MG tablet TAKE 1/2 TABLET TWICE A DAY 90 tablet 1    TRUEplus Lancets 33G MISC TEST BLOOD SUGARS DAILY      blood glucose test strips (TRUE METRIX BLOOD GLUCOSE TEST) strip Use to check blood sugar daily, Dx: E11.9 300 each 3    polyethylene glycol (MIRALAX) 17 GM/SCOOP powder Take 17 g by mouth daily 510 g 2    Famotidine (PEPCID PO) Take 20 mg by mouth as needed       acetaminophen (TYLENOL) 500 MG tablet Take 500 mg by mouth every 6 hours as needed for Pain      nitroGLYCERIN (NITROSTAT) 0.4 MG SL tablet Place 1 tablet under the tongue every 5 minutes as needed for Chest pain 25 tablet 5    Ascorbic Acid (VITAMIN C) 1000 MG tablet Take 1,000 mg by mouth daily.      aspirin EC 81 MG EC tablet Take 81 mg by mouth daily. No current facility-administered medications for this visit. Orders Placed This Encounter   Procedures    POCT Glucose    POCT glycosylated hemoglobin (Hb A1C)     Results for orders placed or performed in visit on 06/03/22   POCT Glucose   Result Value Ref Range    Glucose 142 (A) mg/dL    QC OK? POCT glycosylated hemoglobin (Hb A1C)   Result Value Ref Range    Hemoglobin A1C 6.7 (A) %     Ravi received counseling on the following healthy behaviors: nutrition and exercise    Patient given educational materials on Diabetes and Hyperlipidemia    I have instructed Benoit Fitch to complete a self tracking handout on Blood Sugars  and Blood Pressures  and instructed them to bring it with them to his next appointment. Discussed use, benefit, and side effects of prescribed medications. Barriers to medication compliance addressed. All patient questions answered. Pt voiced understanding.            needs  Lab and   See  In   3 mths     Lorri Luong MD

## 2022-07-10 DIAGNOSIS — I10 ESSENTIAL HYPERTENSION: ICD-10-CM

## 2022-07-10 DIAGNOSIS — E11.8 TYPE 2 DIABETES MELLITUS WITH COMPLICATION, WITHOUT LONG-TERM CURRENT USE OF INSULIN (HCC): ICD-10-CM

## 2022-07-11 NOTE — TELEPHONE ENCOUNTER
Date of last visit:  6/3/2022  Date of next visit:  9/9/2022    Requested Prescriptions     Pending Prescriptions Disp Refills    glimepiride (AMARYL) 4 MG tablet [Pharmacy Med Name: GLIMEPIRIDE  TAB 4MG] 90 tablet 1     Sig: TAKE 1/2 TABLET TWICE A DAY    metoprolol tartrate (LOPRESSOR) 25 MG tablet [Pharmacy Med Name: METOPROL TAR TAB 25MG] 90 tablet 1     Sig: TAKE 1/2 TABLET TWICE A DAY

## 2022-07-12 RX ORDER — GLIMEPIRIDE 4 MG/1
TABLET ORAL
Qty: 90 TABLET | Refills: 1 | Status: SHIPPED | OUTPATIENT
Start: 2022-07-12

## 2022-09-09 ENCOUNTER — OFFICE VISIT (OUTPATIENT)
Dept: FAMILY MEDICINE CLINIC | Age: 87
End: 2022-09-09

## 2022-09-09 VITALS
HEIGHT: 72 IN | HEART RATE: 76 BPM | RESPIRATION RATE: 16 BRPM | DIASTOLIC BLOOD PRESSURE: 62 MMHG | BODY MASS INDEX: 24.11 KG/M2 | WEIGHT: 178 LBS | SYSTOLIC BLOOD PRESSURE: 110 MMHG

## 2022-09-09 DIAGNOSIS — K63.5 POLYP OF COLON, UNSPECIFIED PART OF COLON, UNSPECIFIED TYPE: ICD-10-CM

## 2022-09-09 DIAGNOSIS — I25.10 CORONARY ARTERY DISEASE INVOLVING NATIVE CORONARY ARTERY OF NATIVE HEART WITHOUT ANGINA PECTORIS: ICD-10-CM

## 2022-09-09 DIAGNOSIS — M48.062 LUMBAR STENOSIS WITH NEUROGENIC CLAUDICATION: ICD-10-CM

## 2022-09-09 DIAGNOSIS — I95.1 AUTONOMIC ORTHOSTATIC HYPOTENSION: ICD-10-CM

## 2022-09-09 DIAGNOSIS — I10 PRIMARY HYPERTENSION: ICD-10-CM

## 2022-09-09 DIAGNOSIS — E78.00 PURE HYPERCHOLESTEROLEMIA: ICD-10-CM

## 2022-09-09 DIAGNOSIS — E11.9 TYPE 2 DIABETES MELLITUS WITHOUT COMPLICATION, WITHOUT LONG-TERM CURRENT USE OF INSULIN (HCC): Primary | ICD-10-CM

## 2022-09-09 LAB
CHP ED QC CHECK: ABNORMAL
GLUCOSE BLD-MCNC: 198 MG/DL
HBA1C MFR BLD: 6.6 %

## 2022-09-09 PROCEDURE — 83036 HEMOGLOBIN GLYCOSYLATED A1C: CPT | Performed by: FAMILY MEDICINE

## 2022-09-09 PROCEDURE — 1036F TOBACCO NON-USER: CPT | Performed by: FAMILY MEDICINE

## 2022-09-09 PROCEDURE — 82962 GLUCOSE BLOOD TEST: CPT | Performed by: FAMILY MEDICINE

## 2022-09-09 PROCEDURE — G8427 DOCREV CUR MEDS BY ELIG CLIN: HCPCS | Performed by: FAMILY MEDICINE

## 2022-09-09 PROCEDURE — G8420 CALC BMI NORM PARAMETERS: HCPCS | Performed by: FAMILY MEDICINE

## 2022-09-09 PROCEDURE — 1123F ACP DISCUSS/DSCN MKR DOCD: CPT | Performed by: FAMILY MEDICINE

## 2022-09-09 PROCEDURE — 99213 OFFICE O/P EST LOW 20 MIN: CPT | Performed by: FAMILY MEDICINE

## 2022-09-09 SDOH — ECONOMIC STABILITY: FOOD INSECURITY: WITHIN THE PAST 12 MONTHS, THE FOOD YOU BOUGHT JUST DIDN'T LAST AND YOU DIDN'T HAVE MONEY TO GET MORE.: NEVER TRUE

## 2022-09-09 SDOH — ECONOMIC STABILITY: FOOD INSECURITY: WITHIN THE PAST 12 MONTHS, YOU WORRIED THAT YOUR FOOD WOULD RUN OUT BEFORE YOU GOT MONEY TO BUY MORE.: NEVER TRUE

## 2022-09-09 ASSESSMENT — ENCOUNTER SYMPTOMS
ABDOMINAL PAIN: 0
SORE THROAT: 0
NAUSEA: 0
CONSTIPATION: 0
TROUBLE SWALLOWING: 0
CHEST TIGHTNESS: 0
SHORTNESS OF BREATH: 0
BACK PAIN: 0
BLOOD IN STOOL: 0
EYE PAIN: 0
COUGH: 0

## 2022-09-09 ASSESSMENT — SOCIAL DETERMINANTS OF HEALTH (SDOH): HOW HARD IS IT FOR YOU TO PAY FOR THE VERY BASICS LIKE FOOD, HOUSING, MEDICAL CARE, AND HEATING?: NOT HARD AT ALL

## 2022-09-09 NOTE — PROGRESS NOTES
Subjective:      Patient ID: Brittanie Enriquez is a 81 y.o. male. Hypthyroid  noted       Lipid noted  \\  thyroiod  stable     Diabetes  He presents for his follow-up diabetic visit. He has type 2 diabetes mellitus. His disease course has been stable. There are no hypoglycemic associated symptoms. Pertinent negatives for hypoglycemia include no dizziness, headaches or sweats. There are no diabetic associated symptoms. Pertinent negatives for diabetes include no chest pain, no fatigue and no weakness. There are no hypoglycemic complications. Symptoms are stable. Current diabetic treatment includes oral agent (monotherapy). He is compliant with treatment all of the time. He is following a diabetic diet. Meal planning includes avoidance of concentrated sweets. There is no change in his home blood glucose trend. Hypertension  This is a chronic problem. The current episode started more than 1 year ago. The problem has been resolved since onset. The problem is controlled. Pertinent negatives include no chest pain, headaches, palpitations, peripheral edema, shortness of breath or sweats. The current treatment provides significant improvement. There are no compliance problems.    /  Past Medical History:   Diagnosis Date    CAD (coronary artery disease)     Colon polyp     Hyperlipidemia     Hypertension     S/P CABG (coronary artery bypass graft)     Type II or unspecified type diabetes mellitus without mention of complication, not stated as uncontrolled       Review of Systems   Constitutional:  Negative for fatigue and fever. HENT:  Negative for congestion, ear pain, postnasal drip, sore throat and trouble swallowing. Eyes:  Negative for pain. Respiratory:  Negative for cough, chest tightness and shortness of breath. Cardiovascular:  Negative for chest pain, palpitations and leg swelling. Gastrointestinal:  Negative for abdominal pain, blood in stool, constipation and nausea.    Genitourinary:  Negative for difficulty urinating, frequency and urgency. Musculoskeletal:  Negative for arthralgias, back pain, joint swelling and neck stiffness. Skin:  Negative for rash. Neurological:  Negative for dizziness, weakness and headaches. Hematological:  Negative for adenopathy. Does not bruise/bleed easily. Psychiatric/Behavioral:  Negative for behavioral problems, dysphoric mood and sleep disturbance. /62 (Site: Right Upper Arm, Position: Sitting, Cuff Size: Medium Adult)   Pulse 76   Resp 16   Ht 6' (1.829 m)   Wt 178 lb (80.7 kg)   BMI 24.14 kg/m²    Objective:   Physical Exam  Vitals and nursing note reviewed. Constitutional:       Appearance: He is well-developed. HENT:      Head: Normocephalic and atraumatic. Right Ear: External ear normal.      Left Ear: External ear normal.      Nose: Nose normal.   Eyes:      Conjunctiva/sclera: Conjunctivae normal.      Pupils: Pupils are equal, round, and reactive to light. Comments: Fundi nl   Neck:      Thyroid: No thyromegaly. Cardiovascular:      Rate and Rhythm: Normal rate and regular rhythm. Heart sounds: Normal heart sounds. Pulmonary:      Effort: Pulmonary effort is normal.      Breath sounds: Normal breath sounds. No wheezing or rales. Abdominal:      General: Bowel sounds are normal.      Palpations: Abdomen is soft. There is no mass. Tenderness: There is no abdominal tenderness. Musculoskeletal:         General: Normal range of motion. Cervical back: Normal range of motion and neck supple. Comments:  back pain noted       Left  knee pain   Lymphadenopathy:      Cervical: No cervical adenopathy. Skin:     General: Skin is warm and dry. Findings: No rash. Neurological:      Mental Status: He is alert and oriented to person, place, and time. Cranial Nerves: No cranial nerve deficit. Deep Tendon Reflexes: Reflexes are normal and symmetric. Assessment:        ICD-10-CM    1.  Type 2 diabetes mellitus without complication, without long-term current use of insulin (HCC)  E11.9 POCT Glucose     POCT glycosylated hemoglobin (Hb A1C)      2. Autonomic orthostatic hypotension  I95.1       3. Lumbar stenosis with neurogenic claudication  M48.062       4. Polyp of colon, unspecified part of colon, unspecified type  K63.5       5. Pure hypercholesterolemia  E78.00       6. Primary hypertension  I10       7. Coronary artery disease involving native coronary artery of native heart without angina pectoris  I25.10              Plan:      Current Outpatient Medications   Medication Sig Dispense Refill    glimepiride (AMARYL) 4 MG tablet TAKE 1/2 TABLET TWICE A DAY 90 tablet 1    metoprolol tartrate (LOPRESSOR) 25 MG tablet TAKE 1/2 TABLET TWICE A DAY 90 tablet 1    simvastatin (ZOCOR) 10 MG tablet take 1 tablet by mouth every evening 90 tablet 2    tamsulosin (FLOMAX) 0.4 MG capsule TAKE 1 CAPSULE DAILY 90 capsule 1    metFORMIN (GLUCOPHAGE) 500 MG tablet TAKE 1 TABLET EVERY MORNINGAND TAKE 2 TABLETS EVERY   EVENING 270 tablet 1    levothyroxine (SYNTHROID) 25 MCG tablet TAKE 1 TABLET DAILY 90 tablet 1    midodrine (PROAMATINE) 10 MG tablet Take 1 tablet by mouth 3 times daily 90 tablet 11    TRUEplus Lancets 33G MISC TEST BLOOD SUGARS DAILY      blood glucose test strips (TRUE METRIX BLOOD GLUCOSE TEST) strip Use to check blood sugar daily, Dx: E11.9 300 each 3    polyethylene glycol (MIRALAX) 17 GM/SCOOP powder Take 17 g by mouth daily 510 g 2    Famotidine (PEPCID PO) Take 20 mg by mouth as needed       acetaminophen (TYLENOL) 500 MG tablet Take 500 mg by mouth every 6 hours as needed for Pain      nitroGLYCERIN (NITROSTAT) 0.4 MG SL tablet Place 1 tablet under the tongue every 5 minutes as needed for Chest pain 25 tablet 5    Ascorbic Acid (VITAMIN C) 1000 MG tablet Take 1,000 mg by mouth daily. aspirin EC 81 MG EC tablet Take 81 mg by mouth daily.          No current facility-administered medications for this visit. Orders Placed This Encounter   Procedures    POCT Glucose    POCT glycosylated hemoglobin (Hb A1C)     Results for orders placed or performed in visit on 09/09/22   POCT Glucose   Result Value Ref Range    Glucose 198 (A) mg/dL    QC OK? POCT glycosylated hemoglobin (Hb A1C)   Result Value Ref Range    Hemoglobin A1C 6.6 (A) %     Ravi received counseling on the following healthy behaviors: nutrition and exercise    Patient given educational materials on Diabetes and Hyperlipidemia    I have instructed Simone Bring to complete a self tracking handout on Blood Sugars  and Blood Pressures  and instructed them to bring it with them to his next appointment. Discussed use, benefit, and side effects of prescribed medications. Barriers to medication compliance addressed. All patient questions answered. Pt voiced understanding.           Yvonne Wall MD

## 2022-09-28 DIAGNOSIS — N40.0 BENIGN PROSTATIC HYPERPLASIA WITHOUT LOWER URINARY TRACT SYMPTOMS: ICD-10-CM

## 2022-09-29 NOTE — TELEPHONE ENCOUNTER
Date of last visit:  9/9/2022  Date of next visit:  12/21/2022    Requested Prescriptions     Pending Prescriptions Disp Refills    tamsulosin (FLOMAX) 0.4 MG capsule [Pharmacy Med Name: TAMSULOSIN CAP 0.4MG] 90 capsule 1     Sig: TAKE 1 CAPSULE DAILY

## 2022-09-30 RX ORDER — TAMSULOSIN HYDROCHLORIDE 0.4 MG/1
CAPSULE ORAL
Qty: 90 CAPSULE | Refills: 1 | Status: SHIPPED | OUTPATIENT
Start: 2022-09-30

## 2022-10-04 ENCOUNTER — OFFICE VISIT (OUTPATIENT)
Dept: INTERNAL MEDICINE CLINIC | Age: 87
End: 2022-10-04
Payer: MEDICARE

## 2022-10-04 VITALS
TEMPERATURE: 97.3 F | WEIGHT: 178.6 LBS | SYSTOLIC BLOOD PRESSURE: 104 MMHG | HEART RATE: 84 BPM | DIASTOLIC BLOOD PRESSURE: 62 MMHG | BODY MASS INDEX: 24.22 KG/M2

## 2022-10-04 DIAGNOSIS — R39.12 BENIGN PROSTATIC HYPERPLASIA WITH WEAK URINARY STREAM: ICD-10-CM

## 2022-10-04 DIAGNOSIS — Z95.1 S/P CABG (CORONARY ARTERY BYPASS GRAFT): Primary | ICD-10-CM

## 2022-10-04 DIAGNOSIS — I77.89 ENLARGED AORTA (HCC): ICD-10-CM

## 2022-10-04 DIAGNOSIS — I71.21 ANEURYSM OF ASCENDING AORTA WITHOUT RUPTURE: ICD-10-CM

## 2022-10-04 DIAGNOSIS — R06.09 DYSPNEA ON EXERTION: ICD-10-CM

## 2022-10-04 DIAGNOSIS — N40.1 BENIGN PROSTATIC HYPERPLASIA WITH WEAK URINARY STREAM: ICD-10-CM

## 2022-10-04 DIAGNOSIS — I95.1 AUTONOMIC ORTHOSTATIC HYPOTENSION: ICD-10-CM

## 2022-10-04 PROCEDURE — 1036F TOBACCO NON-USER: CPT | Performed by: INTERNAL MEDICINE

## 2022-10-04 PROCEDURE — G8427 DOCREV CUR MEDS BY ELIG CLIN: HCPCS | Performed by: INTERNAL MEDICINE

## 2022-10-04 PROCEDURE — G8420 CALC BMI NORM PARAMETERS: HCPCS | Performed by: INTERNAL MEDICINE

## 2022-10-04 PROCEDURE — 93000 ELECTROCARDIOGRAM COMPLETE: CPT | Performed by: INTERNAL MEDICINE

## 2022-10-04 PROCEDURE — 1123F ACP DISCUSS/DSCN MKR DOCD: CPT | Performed by: INTERNAL MEDICINE

## 2022-10-04 PROCEDURE — G8484 FLU IMMUNIZE NO ADMIN: HCPCS | Performed by: INTERNAL MEDICINE

## 2022-10-04 PROCEDURE — 99213 OFFICE O/P EST LOW 20 MIN: CPT | Performed by: INTERNAL MEDICINE

## 2022-10-04 NOTE — PROGRESS NOTES
Heather Allison (:  1935) is a 81 y.o. male,Established patient, here for evaluation of the following chief complaint(s):  Hypertension, Hypothyroidism, and Diabetes (Follows PCP for)         ASSESSMENT/PLAN:  1. S/P CABG (coronary artery bypass graft) denies chest pain; gets sob; plan echo  2. Enlarged aorta ([address]- ascending; screen with echo, abd us  -     US ABDOMINAL AORTA LIMITED; Future  3. Dyspnea on exertion- gets sob walking to mailbox. Plan below. Consider pft  -     ECHO Complete 2D W Doppler W Color  4. Aneurysm of ascending aorta without rupture above  5. Autonomic orthostatic hypotension- on midodrine- advised get up slowly  6. Benign prostatic hyperplasia with weak urinary stream- advised to discuss with Dr Taiwo Tee next visit. Gets up frequently during night. Some slowing. Has not seen Urologist in past      Return in about 6 months (around 2023). Subjective   SUBJECTIVE/OBJECTIVE:  HPI pt with cad, cabg, orthostatic hypotension seen in f/u. No new sxs. Has thoracic AA, no sxs. Gets sob easily, suspect pulm origin    Review of Systems   Twelve point ROS completed and found to be negative except as noted above.       Objective   Physical Exam   /62 (Site: Left Upper Arm)   Pulse 84   Temp 97.3 °F (36.3 °C)   Wt 178 lb 9.6 oz (81 kg)   BMI 24.22 kg/m²     General appearance - chronically ill appearing    Mental Status - alert, oriented to person, place, and time          Neck - supple, no significant adenopathy        Chest - fine crackles right     Heart - normal rate and regular rhythm, systolic murmur 2/6 at apex     Abdomen - soft, nontender, nondistended, no masses or organomegaly  Aorta appears enlarged to palpation         Neurological - alert, oriented, normal speech, no focal findings or movement disorder noted     Musculoskeletal -   [id number] [id number]     Extremities - no pedal edema noted     Skin - normal coloration and turgor, no rashes, no suspicious skin lesions noted            An electronic signature was used to authenticate this note.     --Meg Francois MD

## 2022-10-07 ENCOUNTER — HOSPITAL ENCOUNTER (OUTPATIENT)
Dept: NON INVASIVE DIAGNOSTICS | Age: 87
Discharge: HOME | End: 2022-10-07
Payer: MEDICARE

## 2022-10-07 ENCOUNTER — HOSPITAL ENCOUNTER (OUTPATIENT)
Dept: ULTRASOUND IMAGING | Age: 87
Discharge: HOME | End: 2022-10-07
Payer: MEDICARE

## 2022-10-07 DIAGNOSIS — I77.89 ENLARGED AORTA (HCC): ICD-10-CM

## 2022-10-07 LAB
LV EF: 50 %
LVEF MODALITY: NORMAL

## 2022-10-07 PROCEDURE — 76775 US EXAM ABDO BACK WALL LIM: CPT

## 2022-10-07 PROCEDURE — 93306 TTE W/DOPPLER COMPLETE: CPT

## 2022-10-12 ENCOUNTER — TELEPHONE (OUTPATIENT)
Dept: INTERNAL MEDICINE CLINIC | Age: 87
End: 2022-10-12

## 2022-10-12 NOTE — TELEPHONE ENCOUNTER
----- Message from Alexandru Hendricks MD sent at 10/8/2022  1:11 PM EDT -----  Tell him aorta no change in size, heart otherwise looks good; normal strength

## 2022-10-12 NOTE — TELEPHONE ENCOUNTER
Message  Received: 5 days ago  David Hall MD  [facility], [place]  Tell him only mild enlargement of abdominal aorta

## 2022-10-12 NOTE — TELEPHONE ENCOUNTER
I spoke with pts wife and advised her u/s of abd aorta only shows mild enlargement of the abdominal aorta and echo shows aorta has not changed in size. Heart is otherwise good and strength is good. Pts wife verbalizes understanding.

## 2022-10-13 DIAGNOSIS — E03.4 HYPOTHYROIDISM DUE TO ACQUIRED ATROPHY OF THYROID: ICD-10-CM

## 2022-10-13 DIAGNOSIS — E11.9 TYPE 2 DIABETES MELLITUS WITHOUT COMPLICATION, WITHOUT LONG-TERM CURRENT USE OF INSULIN (HCC): ICD-10-CM

## 2022-10-14 RX ORDER — LEVOTHYROXINE SODIUM 0.03 MG/1
TABLET ORAL
Qty: 90 TABLET | Refills: 1 | Status: SHIPPED | OUTPATIENT
Start: 2022-10-14

## 2022-10-14 NOTE — TELEPHONE ENCOUNTER
The pharmacy is requesting a refill of the below medication which has been pended for you:     Requested Prescriptions     Pending Prescriptions Disp Refills    levothyroxine (SYNTHROID) 25 MCG tablet [Pharmacy Med Name: LEVOTHYROXIN TAB 25MCG] 90 tablet 1     Sig: TAKE 1 TABLET DAILY    metFORMIN (GLUCOPHAGE) 500 MG tablet [Pharmacy Med Name: METFORMIN TAB 500MG] 270 tablet 1     Sig: TAKE 1 TABLET EVERY MORNINGAND TAKE 2 TABLETS EVERY   EVENING       Last Appointment Date: 9/9/2022  Next Appointment Date: 12/21/2022    No Known Allergies

## 2022-12-12 DIAGNOSIS — E78.00 PURE HYPERCHOLESTEROLEMIA: ICD-10-CM

## 2022-12-12 NOTE — TELEPHONE ENCOUNTER
Date of last visit:  9/9/2022  Date of next visit:  12/21/2022    Requested Prescriptions     Pending Prescriptions Disp Refills    simvastatin (ZOCOR) 10 MG tablet 90 tablet 2     Sig: take 1 tablet by mouth every evening

## 2022-12-12 NOTE — TELEPHONE ENCOUNTER
Reese Rendon called requesting a refill of their:    simvastatin (ZOCOR) 10 MG tablet QD    Send 90 day supply to [address] [place] [address] on [address]

## 2022-12-13 RX ORDER — SIMVASTATIN 10 MG
TABLET ORAL
Qty: 90 TABLET | Refills: 2 | Status: SHIPPED | OUTPATIENT
Start: 2022-12-13 | End: 2022-12-15

## 2022-12-14 DIAGNOSIS — E78.00 PURE HYPERCHOLESTEROLEMIA: ICD-10-CM

## 2022-12-14 DIAGNOSIS — I10 ESSENTIAL HYPERTENSION: ICD-10-CM

## 2022-12-14 DIAGNOSIS — E11.8 TYPE 2 DIABETES MELLITUS WITH COMPLICATION, WITHOUT LONG-TERM CURRENT USE OF INSULIN (HCC): ICD-10-CM

## 2022-12-15 RX ORDER — GLIMEPIRIDE 4 MG/1
TABLET ORAL
Qty: 90 TABLET | Refills: 0 | Status: SHIPPED | OUTPATIENT
Start: 2022-12-15

## 2022-12-15 RX ORDER — SIMVASTATIN 10 MG
TABLET ORAL
Qty: 90 TABLET | Refills: 0 | Status: SHIPPED | OUTPATIENT
Start: 2022-12-15

## 2022-12-15 NOTE — TELEPHONE ENCOUNTER
Date of last visit:  9/9/2022  Date of next visit:  12/21/2022    Requested Prescriptions     Pending Prescriptions Disp Refills    simvastatin (ZOCOR) 10 MG tablet [Pharmacy Med Name: SIMVASTATIN  TAB 10MG] 90 tablet 0     Sig: TAKE 1 TABLET NIGHTLY (NEW LOWER DOSE)    glimepiride (AMARYL) 4 MG tablet [Pharmacy Med Name: GLIMEPIRIDE  TAB 4MG] 90 tablet 0     Sig: TAKE 1/2 TABLET TWICE A DAY    metoprolol tartrate (LOPRESSOR) 25 MG tablet [Pharmacy Med Name: METOPROL TAR TAB 25MG] 90 tablet 0     Sig: TAKE 1/2 TABLET TWICE A DAY

## 2023-01-06 ENCOUNTER — OFFICE VISIT (OUTPATIENT)
Dept: FAMILY MEDICINE CLINIC | Age: 88
End: 2023-01-06

## 2023-01-06 VITALS
SYSTOLIC BLOOD PRESSURE: 136 MMHG | RESPIRATION RATE: 12 BRPM | BODY MASS INDEX: 24.41 KG/M2 | HEART RATE: 78 BPM | WEIGHT: 180.25 LBS | DIASTOLIC BLOOD PRESSURE: 86 MMHG | HEIGHT: 72 IN

## 2023-01-06 DIAGNOSIS — Z95.1 HX OF CABG: ICD-10-CM

## 2023-01-06 DIAGNOSIS — E11.9 TYPE 2 DIABETES MELLITUS WITHOUT COMPLICATION, WITHOUT LONG-TERM CURRENT USE OF INSULIN (HCC): ICD-10-CM

## 2023-01-06 DIAGNOSIS — I10 PRIMARY HYPERTENSION: ICD-10-CM

## 2023-01-06 DIAGNOSIS — M48.062 LUMBAR STENOSIS WITH NEUROGENIC CLAUDICATION: ICD-10-CM

## 2023-01-06 DIAGNOSIS — E78.00 PURE HYPERCHOLESTEROLEMIA: ICD-10-CM

## 2023-01-06 DIAGNOSIS — I77.89 ENLARGED AORTA (HCC): ICD-10-CM

## 2023-01-06 DIAGNOSIS — E11.8 TYPE 2 DIABETES MELLITUS WITH COMPLICATION, WITHOUT LONG-TERM CURRENT USE OF INSULIN (HCC): Primary | ICD-10-CM

## 2023-01-06 DIAGNOSIS — I95.1 AUTONOMIC ORTHOSTATIC HYPOTENSION: ICD-10-CM

## 2023-01-06 DIAGNOSIS — E03.4 HYPOTHYROIDISM DUE TO ACQUIRED ATROPHY OF THYROID: ICD-10-CM

## 2023-01-06 DIAGNOSIS — L57.0 ACTINIC KERATOSIS: ICD-10-CM

## 2023-01-06 LAB
CHP ED QC CHECK: ABNORMAL
GLUCOSE BLD-MCNC: 155 MG/DL
HBA1C MFR BLD: 6.7 %

## 2023-01-06 ASSESSMENT — PATIENT HEALTH QUESTIONNAIRE - PHQ9
SUM OF ALL RESPONSES TO PHQ QUESTIONS 1-9: 0
1. LITTLE INTEREST OR PLEASURE IN DOING THINGS: 0
SUM OF ALL RESPONSES TO PHQ QUESTIONS 1-9: 0
2. FEELING DOWN, DEPRESSED OR HOPELESS: 0
SUM OF ALL RESPONSES TO PHQ9 QUESTIONS 1 & 2: 0

## 2023-01-06 ASSESSMENT — ENCOUNTER SYMPTOMS
SHORTNESS OF BREATH: 0
ABDOMINAL PAIN: 0
TROUBLE SWALLOWING: 0
COUGH: 0
CONSTIPATION: 0
EYE PAIN: 0
SORE THROAT: 0
BLOOD IN STOOL: 0
NAUSEA: 0
CHEST TIGHTNESS: 0
BACK PAIN: 0

## 2023-01-06 NOTE — PROGRESS NOTES
Subjective:      Patient ID: Brenden Law is a 81 y.o. male. Back pain and stenosis  noted       Autonomic  orthoststic  dysf  noted        Thyroid  noted            Right  knee and   right  hip    Diabetes  He presents for his follow-up diabetic visit. He has type 2 diabetes mellitus. His disease course has been stable. There are no hypoglycemic associated symptoms. Pertinent negatives for hypoglycemia include no dizziness or headaches. There are no diabetic associated symptoms. Pertinent negatives for diabetes include no chest pain, no fatigue and no weakness. There are no hypoglycemic complications. Symptoms are stable. There are no diabetic complications. He is compliant with treatment all of the time. He is following a diabetic diet. Meal planning includes avoidance of concentrated sweets. His breakfast blood glucose is taken between 8-9 am. His breakfast blood glucose range is generally 130-140 mg/dl. An ACE inhibitor/angiotensin II receptor blocker is not being taken. Eye exam is current. Hypertension  This is a chronic problem. The current episode started more than 1 year ago. The problem has been resolved since onset. The problem is controlled. Pertinent negatives include no chest pain, headaches, palpitations or shortness of breath. The current treatment provides significant improvement. Past Medical History:   Diagnosis Date    CAD (coronary artery disease)     Colon polyp     Hyperlipidemia     Hypertension     S/P CABG (coronary artery bypass graft)     Type II or unspecified type diabetes mellitus without mention of complication, not stated as uncontrolled       Review of Systems   Constitutional:  Negative for fatigue and fever. HENT:  Negative for congestion, ear pain, postnasal drip, sore throat and trouble swallowing. Eyes:  Negative for pain. Respiratory:  Negative for cough, chest tightness and shortness of breath.     Cardiovascular:  Negative for chest pain, palpitations and leg swelling. Gastrointestinal:  Negative for abdominal pain, blood in stool, constipation and nausea. Genitourinary:  Negative for difficulty urinating, frequency and urgency. Musculoskeletal:  Negative for arthralgias, back pain, joint swelling and neck stiffness. Back and  hip pain   Skin:  Negative for rash. Neurological:  Negative for dizziness, weakness and headaches. Hematological:  Negative for adenopathy. Does not bruise/bleed easily. Psychiatric/Behavioral:  Negative for behavioral problems, dysphoric mood and sleep disturbance. /86 (Site: Right Upper Arm, Position: Sitting, Cuff Size: Medium Adult)   Pulse 78   Resp 12   Ht 6' (1.829 m)   Wt 180 lb 4 oz (81.8 kg)   BMI 24.45 kg/m²    Objective:   Physical Exam  Vitals and nursing note reviewed. Constitutional:       Appearance: He is well-developed. HENT:      Head: Normocephalic and atraumatic. Right Ear: External ear normal.      Left Ear: External ear normal.      Nose: Nose normal.   Eyes:      Conjunctiva/sclera: Conjunctivae normal.      Pupils: Pupils are equal, round, and reactive to light. Comments: Fundi nl   Neck:      Thyroid: No thyromegaly. Cardiovascular:      Rate and Rhythm: Normal rate and regular rhythm. Heart sounds: Normal heart sounds. Pulmonary:      Effort: Pulmonary effort is normal.      Breath sounds: Normal breath sounds. No wheezing or rales. Abdominal:      General: Bowel sounds are normal.      Palpations: Abdomen is soft. There is no mass. Tenderness: There is no abdominal tenderness. Musculoskeletal:         General: Normal range of motion. Cervical back: Normal range of motion and neck supple. Lymphadenopathy:      Cervical: No cervical adenopathy. Skin:     General: Skin is warm and dry. Findings: Lesion present. No rash.       Comments:    Right    forehead  3  lesions   actinic  keratosis   Neurological:      Mental Status: He is alert and oriented to person, place, and time. Cranial Nerves: No cranial nerve deficit. Deep Tendon Reflexes: Reflexes are normal and symmetric. Cryo   to  forehead  3  lesion  Assessment:             Plan:      Current Outpatient Medications   Medication Sig Dispense Refill    simvastatin (ZOCOR) 10 MG tablet TAKE 1 TABLET NIGHTLY (NEW LOWER DOSE) 90 tablet 0    glimepiride (AMARYL) 4 MG tablet TAKE 1/2 TABLET TWICE A DAY 90 tablet 0    metoprolol tartrate (LOPRESSOR) 25 MG tablet TAKE 1/2 TABLET TWICE A DAY 90 tablet 0    levothyroxine (SYNTHROID) 25 MCG tablet TAKE 1 TABLET DAILY 90 tablet 1    metFORMIN (GLUCOPHAGE) 500 MG tablet TAKE 1 TABLET EVERY MORNINGAND TAKE 2 TABLETS EVERY   EVENING 270 tablet 1    tamsulosin (FLOMAX) 0.4 MG capsule TAKE 1 CAPSULE DAILY 90 capsule 1    midodrine (PROAMATINE) 10 MG tablet Take 1 tablet by mouth 3 times daily 90 tablet 11    TRUEplus Lancets 33G MISC TEST BLOOD SUGARS DAILY      blood glucose test strips (TRUE METRIX BLOOD GLUCOSE TEST) strip Use to check blood sugar daily, Dx: E11.9 300 each 3    polyethylene glycol (MIRALAX) 17 GM/SCOOP powder Take 17 g by mouth daily 510 g 2    Famotidine (PEPCID PO) Take 20 mg by mouth as needed       acetaminophen (TYLENOL) 500 MG tablet Take 500 mg by mouth every 6 hours as needed for Pain      nitroGLYCERIN (NITROSTAT) 0.4 MG SL tablet Place 1 tablet under the tongue every 5 minutes as needed for Chest pain 25 tablet 5    Ascorbic Acid (VITAMIN C) 1000 MG tablet Take 1,000 mg by mouth daily. aspirin EC 81 MG EC tablet Take 81 mg by mouth daily. No current facility-administered medications for this visit.      Orders Placed This Encounter   Procedures    POCT glycosylated hemoglobin (Hb A1C)    POCT Glucose     Results for orders placed or performed in visit on 01/06/23   POCT glycosylated hemoglobin (Hb A1C)   Result Value Ref Range    Hemoglobin A1C 6.7 %   POCT Glucose   Result Value Ref Range    Glucose 155 (A) mg/dL    QC OK? Toshia Isaacs received counseling on the following healthy behaviors: nutrition and exercise    Patient given educational materials on Diabetes and Hyperlipidemia    I have instructed Toshia Isaacs to complete a self tracking handout on Blood Sugars  and Blood Pressures  and instructed them to bring it with them to his next appointment. Discussed use, benefit, and side effects of prescribed medications. Barriers to medication compliance addressed. All patient questions answered. Pt voiced understanding.           Savana Parker MD

## 2023-03-14 DIAGNOSIS — I10 ESSENTIAL HYPERTENSION: ICD-10-CM

## 2023-03-14 DIAGNOSIS — E78.00 PURE HYPERCHOLESTEROLEMIA: ICD-10-CM

## 2023-03-14 DIAGNOSIS — N40.0 BENIGN PROSTATIC HYPERPLASIA WITHOUT LOWER URINARY TRACT SYMPTOMS: ICD-10-CM

## 2023-03-14 DIAGNOSIS — E11.8 TYPE 2 DIABETES MELLITUS WITH COMPLICATION, WITHOUT LONG-TERM CURRENT USE OF INSULIN (HCC): ICD-10-CM

## 2023-03-14 DIAGNOSIS — E11.9 TYPE 2 DIABETES MELLITUS WITHOUT COMPLICATION, WITHOUT LONG-TERM CURRENT USE OF INSULIN (HCC): ICD-10-CM

## 2023-03-14 DIAGNOSIS — E03.4 HYPOTHYROIDISM DUE TO ACQUIRED ATROPHY OF THYROID: ICD-10-CM

## 2023-03-15 NOTE — TELEPHONE ENCOUNTER
Date of last visit:  1/6/2023  Date of next visit:  4/11/2023    Requested Prescriptions     Pending Prescriptions Disp Refills    levothyroxine (SYNTHROID) 25 MCG tablet [Pharmacy Med Name: LEVOTHYROXIN TAB 25MCG] 90 tablet 1     Sig: TAKE 1 TABLET DAILY    tamsulosin (FLOMAX) 0.4 MG capsule [Pharmacy Med Name: TAMSULOSIN CAP 0.4MG] 90 capsule 1     Sig: TAKE 1 CAPSULE DAILY    metFORMIN (GLUCOPHAGE) 500 MG tablet [Pharmacy Med Name: METFORMIN TAB 500MG] 270 tablet 1     Sig: TAKE 1 TABLET EVERY MORNINGAND TAKE 2 TABLETS EVERY   EVENING

## 2023-03-15 NOTE — TELEPHONE ENCOUNTER
The pharmacy is  requesting a refill of the below medication which has been pended for you:     Requested Prescriptions     Pending Prescriptions Disp Refills    simvastatin (ZOCOR) 10 MG tablet [Pharmacy Med Name: SIMVASTATIN  TAB 10MG] 90 tablet 0     Sig: TAKE 1 TABLET NIGHTLY (NEW LOWER DOSE)    glimepiride (AMARYL) 4 MG tablet [Pharmacy Med Name: GLIMEPIRIDE  TAB 4MG] 90 tablet 0     Sig: TAKE 1/2 TABLET TWICE A DAY    metoprolol tartrate (LOPRESSOR) 25 MG tablet [Pharmacy Med Name: Nanine Orland TAR TAB 25MG] 90 tablet 0     Sig: TAKE 1/2 TABLET TWICE A DAY       Last Appointment Date: Visit date not found  Next Appointment Date: Visit date not found    No Known Allergies

## 2023-03-16 RX ORDER — TAMSULOSIN HYDROCHLORIDE 0.4 MG/1
CAPSULE ORAL
Qty: 90 CAPSULE | Refills: 1 | Status: SHIPPED | OUTPATIENT
Start: 2023-03-16

## 2023-03-16 RX ORDER — GLIMEPIRIDE 4 MG/1
TABLET ORAL
Qty: 90 TABLET | Refills: 0 | Status: SHIPPED | OUTPATIENT
Start: 2023-03-16

## 2023-03-16 RX ORDER — LEVOTHYROXINE SODIUM 0.03 MG/1
TABLET ORAL
Qty: 90 TABLET | Refills: 1 | Status: SHIPPED | OUTPATIENT
Start: 2023-03-16

## 2023-03-16 RX ORDER — SIMVASTATIN 10 MG
TABLET ORAL
Qty: 90 TABLET | Refills: 0 | Status: SHIPPED | OUTPATIENT
Start: 2023-03-16

## 2023-04-03 RX ORDER — MIDODRINE HYDROCHLORIDE 10 MG/1
10 TABLET ORAL 3 TIMES DAILY
Qty: 90 TABLET | Refills: 0 | Status: SHIPPED | OUTPATIENT
Start: 2023-04-03

## 2023-04-20 ENCOUNTER — NURSE ONLY (OUTPATIENT)
Dept: LAB | Age: 88
End: 2023-04-20

## 2023-04-20 DIAGNOSIS — E78.00 PURE HYPERCHOLESTEROLEMIA: ICD-10-CM

## 2023-04-20 LAB
CHOLEST SERPL-MCNC: 117 MG/DL (ref 100–199)
HDLC SERPL-MCNC: 43 MG/DL
LDLC SERPL CALC-MCNC: 53 MG/DL
TRIGL SERPL-MCNC: 106 MG/DL (ref 0–199)

## 2023-04-21 ENCOUNTER — TELEPHONE (OUTPATIENT)
Dept: FAMILY MEDICINE CLINIC | Age: 88
End: 2023-04-21

## 2023-04-21 NOTE — TELEPHONE ENCOUNTER
----- Message from Erlinda Walters MD sent at 4/20/2023  7:24 PM EDT -----  Call as cholesterol is good and stay on the zocor

## 2023-05-05 RX ORDER — MIDODRINE HYDROCHLORIDE 10 MG/1
10 TABLET ORAL 3 TIMES DAILY
Qty: 90 TABLET | Refills: 11 | Status: SHIPPED | OUTPATIENT
Start: 2023-05-05

## 2023-05-10 ENCOUNTER — TELEPHONE (OUTPATIENT)
Dept: CARDIOLOGY CLINIC | Age: 88
End: 2023-05-10

## 2023-05-10 NOTE — TELEPHONE ENCOUNTER
Patient's son on [employer] [address] called stating patient would like to stop midodrine. Patient's dizziness has gotten worse since being on midodrine.

## 2023-07-17 ENCOUNTER — OFFICE VISIT (OUTPATIENT)
Dept: FAMILY MEDICINE CLINIC | Age: 88
End: 2023-07-17

## 2023-07-17 VITALS
WEIGHT: 175.13 LBS | RESPIRATION RATE: 16 BRPM | SYSTOLIC BLOOD PRESSURE: 122 MMHG | BODY MASS INDEX: 25.07 KG/M2 | DIASTOLIC BLOOD PRESSURE: 68 MMHG | HEIGHT: 70 IN | HEART RATE: 68 BPM

## 2023-07-17 DIAGNOSIS — E03.4 HYPOTHYROIDISM DUE TO ACQUIRED ATROPHY OF THYROID: ICD-10-CM

## 2023-07-17 DIAGNOSIS — I95.1 AUTONOMIC ORTHOSTATIC HYPOTENSION: ICD-10-CM

## 2023-07-17 DIAGNOSIS — I25.10 CORONARY ARTERY DISEASE INVOLVING NATIVE CORONARY ARTERY OF NATIVE HEART WITHOUT ANGINA PECTORIS: ICD-10-CM

## 2023-07-17 DIAGNOSIS — I10 PRIMARY HYPERTENSION: ICD-10-CM

## 2023-07-17 DIAGNOSIS — E11.9 TYPE 2 DIABETES MELLITUS WITHOUT COMPLICATION, WITHOUT LONG-TERM CURRENT USE OF INSULIN (HCC): Primary | ICD-10-CM

## 2023-07-17 DIAGNOSIS — E78.00 PURE HYPERCHOLESTEROLEMIA: ICD-10-CM

## 2023-07-17 LAB
CHP ED QC CHECK: ABNORMAL
GLUCOSE BLD-MCNC: 142 MG/DL
HBA1C MFR BLD: 6.2 %

## 2023-07-17 PROCEDURE — G8417 CALC BMI ABV UP PARAM F/U: HCPCS | Performed by: FAMILY MEDICINE

## 2023-07-17 PROCEDURE — G8427 DOCREV CUR MEDS BY ELIG CLIN: HCPCS | Performed by: FAMILY MEDICINE

## 2023-07-17 PROCEDURE — 82044 UR ALBUMIN SEMIQUANTITATIVE: CPT | Performed by: FAMILY MEDICINE

## 2023-07-17 PROCEDURE — 99213 OFFICE O/P EST LOW 20 MIN: CPT | Performed by: FAMILY MEDICINE

## 2023-07-17 PROCEDURE — 1123F ACP DISCUSS/DSCN MKR DOCD: CPT | Performed by: FAMILY MEDICINE

## 2023-07-17 PROCEDURE — 82962 GLUCOSE BLOOD TEST: CPT | Performed by: FAMILY MEDICINE

## 2023-07-17 PROCEDURE — 83036 HEMOGLOBIN GLYCOSYLATED A1C: CPT | Performed by: FAMILY MEDICINE

## 2023-07-17 PROCEDURE — 1036F TOBACCO NON-USER: CPT | Performed by: FAMILY MEDICINE

## 2023-07-17 ASSESSMENT — ENCOUNTER SYMPTOMS
NAUSEA: 0
ABDOMINAL PAIN: 0
TROUBLE SWALLOWING: 0
COUGH: 0
EYE PAIN: 0
SORE THROAT: 0
CONSTIPATION: 0
BLOOD IN STOOL: 0
CHEST TIGHTNESS: 0
SHORTNESS OF BREATH: 0
BACK PAIN: 0

## 2023-07-17 NOTE — PROGRESS NOTES
Subjective:      Patient ID: Jr Costello is a 81 y.o. male.     HPI    Review of Systems    Objective:   Physical Exam    Assessment:      ***      Plan:      ***        Adan Degroot MD
POCT microalbumin     Results for orders placed or performed in visit on 07/17/23   POCT Glucose   Result Value Ref Range    Glucose 142 (A) mg/dL    QC OK? POCT glycosylated hemoglobin (Hb A1C)   Result Value Ref Range    Hemoglobin A1C 6.2 %     Ravi received counseling on the following healthy behaviors: nutrition and exercise    Patient given educational materials on Diabetes and Hyperlipidemia    I have instructed Edwin Katz to complete a self tracking handout on Blood Sugars  and Blood Pressures  and instructed them to bring it with them to his next appointment. Discussed use, benefit, and side effects of prescribed medications. Barriers to medication compliance addressed. All patient questions answered. Pt voiced understanding.           See  3mths  Sammy Bueno MD

## 2023-08-30 ENCOUNTER — TELEPHONE (OUTPATIENT)
Dept: FAMILY MEDICINE CLINIC | Age: 88
End: 2023-08-30

## 2023-08-30 RX ORDER — BENZONATATE 200 MG/1
200 CAPSULE ORAL 3 TIMES DAILY PRN
Qty: 30 CAPSULE | Refills: 0 | Status: SHIPPED | OUTPATIENT
Start: 2023-08-30 | End: 2023-09-06

## 2023-08-30 NOTE — TELEPHONE ENCOUNTER
Raina Burton called stating that he is positive covid and felt his dad has covid also. Pt has cough, sneezing, and fatigue. He ran a covid test on patient and it came back Covid. Pt's SX began last night. He is asking if there is anything that patient would be able to get to help with his SX with him being older.     [facility] on [address] may be reached at [phone]

## 2023-08-30 NOTE — TELEPHONE ENCOUNTER
Date of last visit:  7/17/2023  Date of next visit:  10/23/2023    Requested Prescriptions     Signed Prescriptions Disp Refills    nirmatrelvir/ritonavir 300/100 (PAXLOVID) 20 x 150 MG & 10 x 100MG TBPK 30 tablet 0     Sig: Take 3 tablets (two 150 mg nirmatrelvir and one 100 mg ritonavir tablets) by mouth every 12 hours for 5 days.      Authorizing Provider: Adriana Lucio     Ordering User: ANA CARO    benzonatate (TESSALON) 200 MG capsule 30 capsule 0     Sig: Take 1 capsule by mouth 3 times daily as needed for Cough     Authorizing Provider: Adriana Lucio     Ordering User: Sayra Platt informed via Phone

## 2023-10-12 DIAGNOSIS — N40.0 BENIGN PROSTATIC HYPERPLASIA WITHOUT LOWER URINARY TRACT SYMPTOMS: ICD-10-CM

## 2023-10-12 DIAGNOSIS — E11.9 TYPE 2 DIABETES MELLITUS WITHOUT COMPLICATION, WITHOUT LONG-TERM CURRENT USE OF INSULIN (HCC): ICD-10-CM

## 2023-10-12 DIAGNOSIS — E03.4 HYPOTHYROIDISM DUE TO ACQUIRED ATROPHY OF THYROID: ICD-10-CM

## 2023-10-13 RX ORDER — TAMSULOSIN HYDROCHLORIDE 0.4 MG/1
CAPSULE ORAL
Qty: 90 CAPSULE | Refills: 1 | Status: SHIPPED | OUTPATIENT
Start: 2023-10-13

## 2023-10-13 RX ORDER — LEVOTHYROXINE SODIUM 0.03 MG/1
TABLET ORAL
Qty: 90 TABLET | Refills: 1 | Status: SHIPPED | OUTPATIENT
Start: 2023-10-13

## 2023-10-13 NOTE — TELEPHONE ENCOUNTER
Date of last visit:  7/17/2023  Date of next visit:  10/23/2023    Requested Prescriptions     Pending Prescriptions Disp Refills    levothyroxine (SYNTHROID) 25 MCG tablet [Pharmacy Med Name: LEVOTHYROXIN TAB 25MCG] 90 tablet 1     Sig: TAKE 1 TABLET DAILY    tamsulosin (FLOMAX) 0.4 MG capsule [Pharmacy Med Name: TAMSULOSIN CAP 0.4MG] 90 capsule 1     Sig: TAKE 1 CAPSULE DAILY    metFORMIN (GLUCOPHAGE) 500 MG tablet [Pharmacy Med Name: METFORMIN TAB 500MG] 270 tablet 1     Sig: TAKE 1 TABLET EVERY MORNINGAND TAKE 2 TABLETS EVERY   EVENING

## 2024-01-03 DIAGNOSIS — E11.8 TYPE 2 DIABETES MELLITUS WITH COMPLICATION, WITHOUT LONG-TERM CURRENT USE OF INSULIN (HCC): ICD-10-CM

## 2024-01-03 DIAGNOSIS — I10 ESSENTIAL HYPERTENSION: ICD-10-CM

## 2024-01-03 DIAGNOSIS — E78.00 PURE HYPERCHOLESTEROLEMIA: ICD-10-CM

## 2024-01-04 NOTE — TELEPHONE ENCOUNTER
Date of last visit:  7/17/2023  Date of next visit:  Visit date not found    Requested Prescriptions     Pending Prescriptions Disp Refills    metoprolol tartrate (LOPRESSOR) 25 MG tablet [Pharmacy Med Name: METOPROL TAR TAB 25MG] 90 tablet 1     Sig: TAKE 1/2 TABLET TWICE A DAY    glimepiride (AMARYL) 4 MG tablet [Pharmacy Med Name: GLIMEPIRIDE  TAB 4MG] 90 tablet 1     Sig: TAKE 1/2 TABLET TWICE A DAY    simvastatin (ZOCOR) 10 MG tablet [Pharmacy Med Name: SIMVASTATIN  TAB 10MG] 90 tablet 1     Sig: TAKE 1 TABLET NIGHTLY

## 2024-01-05 RX ORDER — SIMVASTATIN 10 MG
TABLET ORAL
Qty: 90 TABLET | Refills: 1 | Status: SHIPPED | OUTPATIENT
Start: 2024-01-05

## 2024-01-05 RX ORDER — GLIMEPIRIDE 4 MG/1
TABLET ORAL
Qty: 90 TABLET | Refills: 1 | Status: SHIPPED | OUTPATIENT
Start: 2024-01-05

## 2024-01-12 ENCOUNTER — TELEPHONE (OUTPATIENT)
Dept: FAMILY MEDICINE CLINIC | Age: 89
End: 2024-01-12

## 2024-01-12 ENCOUNTER — OFFICE VISIT (OUTPATIENT)
Dept: FAMILY MEDICINE CLINIC | Age: 89
End: 2024-01-12

## 2024-01-12 VITALS
DIASTOLIC BLOOD PRESSURE: 68 MMHG | HEIGHT: 70 IN | HEART RATE: 92 BPM | WEIGHT: 179.5 LBS | SYSTOLIC BLOOD PRESSURE: 118 MMHG | BODY MASS INDEX: 25.7 KG/M2 | RESPIRATION RATE: 18 BRPM

## 2024-01-12 DIAGNOSIS — E11.9 TYPE 2 DIABETES MELLITUS WITHOUT COMPLICATION, WITHOUT LONG-TERM CURRENT USE OF INSULIN (HCC): Primary | ICD-10-CM

## 2024-01-12 DIAGNOSIS — E03.4 HYPOTHYROIDISM DUE TO ACQUIRED ATROPHY OF THYROID: ICD-10-CM

## 2024-01-12 DIAGNOSIS — E78.00 PURE HYPERCHOLESTEROLEMIA: ICD-10-CM

## 2024-01-12 DIAGNOSIS — I10 PRIMARY HYPERTENSION: ICD-10-CM

## 2024-01-12 DIAGNOSIS — I95.1 AUTONOMIC ORTHOSTATIC HYPOTENSION: ICD-10-CM

## 2024-01-12 DIAGNOSIS — I25.119 ATHEROSCLEROSIS OF NATIVE CORONARY ARTERY OF NATIVE HEART WITH ANGINA PECTORIS (HCC): ICD-10-CM

## 2024-01-12 DIAGNOSIS — I77.89 ENLARGED AORTA (HCC): ICD-10-CM

## 2024-01-12 DIAGNOSIS — M48.062 LUMBAR STENOSIS WITH NEUROGENIC CLAUDICATION: ICD-10-CM

## 2024-01-12 DIAGNOSIS — I10 ESSENTIAL HYPERTENSION: ICD-10-CM

## 2024-01-12 PROBLEM — I20.9 ANGINA PECTORIS, UNSPECIFIED (HCC): Status: ACTIVE | Noted: 2024-01-12

## 2024-01-12 LAB
CHP ED QC CHECK: ABNORMAL
GLUCOSE BLD-MCNC: 130 MG/DL
HBA1C MFR BLD: 6.9 %

## 2024-01-12 RX ORDER — CALCIUM CITRATE/VITAMIN D3 200MG-6.25
TABLET ORAL
Qty: 300 EACH | Refills: 3 | Status: SHIPPED | OUTPATIENT
Start: 2024-01-12

## 2024-01-12 ASSESSMENT — PATIENT HEALTH QUESTIONNAIRE - PHQ9
SUM OF ALL RESPONSES TO PHQ QUESTIONS 1-9: 2
SUM OF ALL RESPONSES TO PHQ QUESTIONS 1-9: 2
1. LITTLE INTEREST OR PLEASURE IN DOING THINGS: 1
SUM OF ALL RESPONSES TO PHQ QUESTIONS 1-9: 2
SUM OF ALL RESPONSES TO PHQ QUESTIONS 1-9: 2
2. FEELING DOWN, DEPRESSED OR HOPELESS: 1
SUM OF ALL RESPONSES TO PHQ9 QUESTIONS 1 & 2: 2

## 2024-01-12 ASSESSMENT — ENCOUNTER SYMPTOMS
EYE PAIN: 0
BACK PAIN: 0
ABDOMINAL PAIN: 0
BLOOD IN STOOL: 0
BLURRED VISION: 0
CONSTIPATION: 0
CHEST TIGHTNESS: 0
NAUSEA: 0
SORE THROAT: 0
TROUBLE SWALLOWING: 0
COUGH: 0
SHORTNESS OF BREATH: 0
ORTHOPNEA: 0

## 2024-01-12 NOTE — TELEPHONE ENCOUNTER
Needs wellness exam at the next visit      Labs  on 4-14-24 or later  on printer and medicare wellness     4-18 -23 to medicare wellness

## 2024-01-12 NOTE — PROGRESS NOTES
Subjective:      Patient ID: Ravi Khan is a 88 y.o. male.      Bph  stable      Thyroid  stable      Lipids  stable     Lumbar stenosis  of  back  with leg pain  and  with hip and knee pain  noted        Constipation and use  of miralax        Diabetes  He presents for his follow-up diabetic visit. He has type 2 diabetes mellitus. His disease course has been stable. There are no hypoglycemic associated symptoms. Pertinent negatives for hypoglycemia include no dizziness or headaches. Pertinent negatives for diabetes include no blurred vision, no chest pain, no fatigue, no foot ulcerations and no weakness. There are no hypoglycemic complications. Symptoms are stable. Diabetic complications include heart disease. There are no known risk factors for coronary artery disease. Current diabetic treatment includes oral agent (dual therapy). He is compliant with treatment all of the time. His weight is stable. He is following a diabetic diet. Meal planning includes avoidance of concentrated sweets. He participates in exercise daily. His breakfast blood glucose is taken between 7-8 am. His breakfast blood glucose range is generally 110-130 mg/dl. An ACE inhibitor/angiotensin II receptor blocker is not being taken.   Hypertension  This is a chronic problem. The current episode started more than 1 year ago. The problem has been resolved since onset. The problem is controlled. Pertinent negatives include no blurred vision, chest pain, headaches, orthopnea, palpitations, peripheral edema or shortness of breath. The current treatment provides significant improvement. There are no compliance problems.  Hypertensive end-organ damage includes CAD/MI.   Gastroesophageal Reflux  He reports no abdominal pain, no chest pain, no coughing, no early satiety, no nausea or no sore throat. This is a chronic problem. The current episode started more than 1 year ago. The problem occurs rarely. The problem has been resolved. The symptoms are

## 2024-04-18 ENCOUNTER — TELEPHONE (OUTPATIENT)
Dept: CARDIOLOGY CLINIC | Age: 89
End: 2024-04-18

## 2024-04-18 ENCOUNTER — OFFICE VISIT (OUTPATIENT)
Dept: CARDIOLOGY CLINIC | Age: 89
End: 2024-04-18
Payer: MEDICARE

## 2024-04-18 ENCOUNTER — HOSPITAL ENCOUNTER (OUTPATIENT)
Age: 89
Discharge: HOME | End: 2024-04-18
Payer: MEDICARE

## 2024-04-18 VITALS
HEIGHT: 70 IN | BODY MASS INDEX: 25.76 KG/M2 | DIASTOLIC BLOOD PRESSURE: 60 MMHG | HEART RATE: 75 BPM | SYSTOLIC BLOOD PRESSURE: 110 MMHG

## 2024-04-18 DIAGNOSIS — E03.4 HYPOTHYROIDISM DUE TO ACQUIRED ATROPHY OF THYROID: ICD-10-CM

## 2024-04-18 DIAGNOSIS — E78.01 FAMILIAL HYPERCHOLESTEROLEMIA: ICD-10-CM

## 2024-04-18 DIAGNOSIS — R09.89 BRUIT: ICD-10-CM

## 2024-04-18 DIAGNOSIS — E78.00 PURE HYPERCHOLESTEROLEMIA: ICD-10-CM

## 2024-04-18 DIAGNOSIS — N40.0 BENIGN PROSTATIC HYPERPLASIA WITHOUT LOWER URINARY TRACT SYMPTOMS: ICD-10-CM

## 2024-04-18 DIAGNOSIS — I25.810 CORONARY ARTERY DISEASE INVOLVING CORONARY BYPASS GRAFT OF NATIVE HEART WITHOUT ANGINA PECTORIS: Primary | ICD-10-CM

## 2024-04-18 DIAGNOSIS — I10 ESSENTIAL HYPERTENSION: ICD-10-CM

## 2024-04-18 DIAGNOSIS — E11.9 TYPE 2 DIABETES MELLITUS WITHOUT COMPLICATION, WITHOUT LONG-TERM CURRENT USE OF INSULIN (HCC): ICD-10-CM

## 2024-04-18 DIAGNOSIS — I10 PRIMARY HYPERTENSION: ICD-10-CM

## 2024-04-18 LAB
ALBUMIN SERPL BCG-MCNC: 4.4 G/DL (ref 3.5–5.1)
ALP SERPL-CCNC: 80 U/L (ref 38–126)
ALT SERPL W/O P-5'-P-CCNC: 13 U/L (ref 11–66)
ANION GAP SERPL CALC-SCNC: 14 MEQ/L (ref 8–16)
AST SERPL-CCNC: 13 U/L (ref 5–40)
BASOPHILS ABSOLUTE: 0 THOU/MM3 (ref 0–0.1)
BASOPHILS NFR BLD AUTO: 0.6 %
BILIRUB SERPL-MCNC: 0.9 MG/DL (ref 0.3–1.2)
BUN SERPL-MCNC: 39 MG/DL (ref 7–22)
CALCIUM SERPL-MCNC: 9.1 MG/DL (ref 8.5–10.5)
CHLORIDE SERPL-SCNC: 105 MEQ/L (ref 98–111)
CHOLEST SERPL-MCNC: 104 MG/DL (ref 100–199)
CO2 SERPL-SCNC: 21 MEQ/L (ref 23–33)
CREAT SERPL-MCNC: 1.3 MG/DL (ref 0.4–1.2)
DEPRECATED RDW RBC AUTO: 42.2 FL (ref 35–45)
EOSINOPHIL NFR BLD AUTO: 2.2 %
EOSINOPHILS ABSOLUTE: 0.2 THOU/MM3 (ref 0–0.4)
ERYTHROCYTE [DISTWIDTH] IN BLOOD BY AUTOMATED COUNT: 12.1 % (ref 11.5–14.5)
GFR SERPL CREATININE-BSD FRML MDRD: 53 ML/MIN/1.73M2
GLUCOSE SERPL-MCNC: 135 MG/DL (ref 70–108)
HCT VFR BLD AUTO: 41.2 % (ref 42–52)
HGB BLD-MCNC: 13.2 GM/DL (ref 14–18)
IMM GRANULOCYTES # BLD AUTO: 0.02 THOU/MM3 (ref 0–0.07)
IMM GRANULOCYTES NFR BLD AUTO: 0.3 %
LDLC SERPL DIRECT ASSAY-MCNC: 57.82 MG/DL
LYMPHOCYTES ABSOLUTE: 1.3 THOU/MM3 (ref 1–4.8)
LYMPHOCYTES NFR BLD AUTO: 19.5 %
MCH RBC QN AUTO: 30.8 PG (ref 26–33)
MCHC RBC AUTO-ENTMCNC: 32 GM/DL (ref 32.2–35.5)
MCV RBC AUTO: 96.3 FL (ref 80–94)
MONOCYTES ABSOLUTE: 0.6 THOU/MM3 (ref 0.4–1.3)
MONOCYTES NFR BLD AUTO: 8.9 %
NEUTROPHILS NFR BLD AUTO: 68.5 %
NRBC BLD AUTO-RTO: 0 /100 WBC
PLATELET # BLD AUTO: 161 THOU/MM3 (ref 130–400)
PMV BLD AUTO: 8.9 FL (ref 9.4–12.4)
POTASSIUM SERPL-SCNC: 5 MEQ/L (ref 3.5–5.2)
PROT SERPL-MCNC: 6.9 G/DL (ref 6.1–8)
RBC # BLD AUTO: 4.28 MILL/MM3 (ref 4.7–6.1)
SEGMENTED NEUTROPHILS ABSOLUTE COUNT: 4.7 THOU/MM3 (ref 1.8–7.7)
SODIUM SERPL-SCNC: 140 MEQ/L (ref 135–145)
T4 FREE SERPL-MCNC: 1.05 NG/DL (ref 0.93–1.68)
TSH SERPL DL<=0.005 MIU/L-ACNC: 3.62 UIU/ML (ref 0.4–4.2)
WBC # BLD AUTO: 6.9 THOU/MM3 (ref 4.8–10.8)

## 2024-04-18 PROCEDURE — 1036F TOBACCO NON-USER: CPT | Performed by: NUCLEAR MEDICINE

## 2024-04-18 PROCEDURE — 36415 COLL VENOUS BLD VENIPUNCTURE: CPT

## 2024-04-18 PROCEDURE — 82465 ASSAY BLD/SERUM CHOLESTEROL: CPT

## 2024-04-18 PROCEDURE — G8417 CALC BMI ABV UP PARAM F/U: HCPCS | Performed by: NUCLEAR MEDICINE

## 2024-04-18 PROCEDURE — 84443 ASSAY THYROID STIM HORMONE: CPT

## 2024-04-18 PROCEDURE — 99214 OFFICE O/P EST MOD 30 MIN: CPT | Performed by: NUCLEAR MEDICINE

## 2024-04-18 PROCEDURE — G8427 DOCREV CUR MEDS BY ELIG CLIN: HCPCS | Performed by: NUCLEAR MEDICINE

## 2024-04-18 PROCEDURE — 1123F ACP DISCUSS/DSCN MKR DOCD: CPT | Performed by: NUCLEAR MEDICINE

## 2024-04-18 PROCEDURE — 80053 COMPREHEN METABOLIC PANEL: CPT

## 2024-04-18 PROCEDURE — 83721 ASSAY OF BLOOD LIPOPROTEIN: CPT

## 2024-04-18 PROCEDURE — 85025 COMPLETE CBC W/AUTO DIFF WBC: CPT

## 2024-04-18 PROCEDURE — 84439 ASSAY OF FREE THYROXINE: CPT

## 2024-04-18 PROCEDURE — 93000 ELECTROCARDIOGRAM COMPLETE: CPT | Performed by: NUCLEAR MEDICINE

## 2024-04-18 NOTE — TELEPHONE ENCOUNTER
Pt was in today Dr Rose ordered a carotid US, which they are going to call to miguel once they look at the appt book     But Dr Rose wanted labs that are already ordered from PCP done today      Please send Dr Rose labs once completed

## 2024-04-18 NOTE — PROGRESS NOTES
[facility] [place] [facility]  [facility] CARDIOLOGY  [address]  [address]  [place] [facility] [address]  Dept: [phone]  Dept Fax: [phone]  Loc: [phone]    Visit Date: 4/18/2024    Ravi Khan is a 88 y.o. male who presents todayfor:  Chief Complaint   Patient presents with    Check-Up    Hypertension    Hyperlipidemia    Coronary Artery Disease   Known CABG 2005  No active chest pain   Some severe limitation   Lately more dizziness  No syncope  Near syncope  Not very active   Does have dyspnea  BP not followed  Lower today   On statins for hyperlipidemia  No issues with the meds          HPI:  HPI  Past Medical History:   Diagnosis Date    CAD (coronary artery disease)     Colon polyp     Hyperlipidemia     Hypertension     S/P CABG (coronary artery bypass graft)     Type II or unspecified type diabetes mellitus without mention of complication, not stated as uncontrolled       Past Surgical History:   Procedure Laterality Date    CARPAL TUNNEL RELEASE  1995 and 1995    left and  right     COLONOSCOPY  2009    dr. decker and due 2014    CORONARY ARTERY BYPASS GRAFT  2005    HERNIA REPAIR      LUMBAR SPINE SURGERY  11/2019      L4  and  L5   kashmen   at   [facility]     TOTAL KNEE ARTHROPLASTY Left 06/03/2019    Dr Marlow @ [facility]      Family History   Problem Relation Age of Onset    Heart Disease Father     Heart Disease Mother      Social History     Tobacco Use    Smoking status: Never    Smokeless tobacco: Never   Substance Use Topics    Alcohol use: Yes     Alcohol/week: 0.0 standard drinks of alcohol     Comment: very rare      Current Outpatient Medications   Medication Sig Dispense Refill    blood glucose test strips (TRUE METRIX BLOOD GLUCOSE TEST) strip Use to check blood sugar daily, Dx: E11.9 300 each 3    metoprolol tartrate (LOPRESSOR) 25 MG tablet TAKE 1/2 TABLET TWICE A DAY 90 tablet 1    glimepiride (AMARYL) 4 MG tablet TAKE 1/2 TABLET TWICE A DAY 90 tablet 1

## 2024-04-19 ENCOUNTER — TELEPHONE (OUTPATIENT)
Dept: FAMILY MEDICINE CLINIC | Age: 89
End: 2024-04-19

## 2024-04-19 ENCOUNTER — OFFICE VISIT (OUTPATIENT)
Dept: FAMILY MEDICINE CLINIC | Age: 89
End: 2024-04-19

## 2024-04-19 VITALS
SYSTOLIC BLOOD PRESSURE: 108 MMHG | DIASTOLIC BLOOD PRESSURE: 68 MMHG | WEIGHT: 181 LBS | RESPIRATION RATE: 16 BRPM | HEART RATE: 84 BPM | HEIGHT: 70 IN | BODY MASS INDEX: 25.91 KG/M2

## 2024-04-19 DIAGNOSIS — E11.9 TYPE 2 DIABETES MELLITUS WITHOUT COMPLICATION, WITHOUT LONG-TERM CURRENT USE OF INSULIN (HCC): ICD-10-CM

## 2024-04-19 DIAGNOSIS — Z95.1 HX OF CABG: ICD-10-CM

## 2024-04-19 DIAGNOSIS — I95.1 AUTONOMIC ORTHOSTATIC HYPOTENSION: ICD-10-CM

## 2024-04-19 DIAGNOSIS — I10 PRIMARY HYPERTENSION: ICD-10-CM

## 2024-04-19 DIAGNOSIS — E03.4 HYPOTHYROIDISM DUE TO ACQUIRED ATROPHY OF THYROID: ICD-10-CM

## 2024-04-19 DIAGNOSIS — M48.062 LUMBAR STENOSIS WITH NEUROGENIC CLAUDICATION: ICD-10-CM

## 2024-04-19 DIAGNOSIS — M17.0 PRIMARY OSTEOARTHRITIS OF BOTH KNEES: ICD-10-CM

## 2024-04-19 DIAGNOSIS — E78.00 PURE HYPERCHOLESTEROLEMIA: ICD-10-CM

## 2024-04-19 DIAGNOSIS — Z00.00 MEDICARE ANNUAL WELLNESS VISIT, SUBSEQUENT: Primary | ICD-10-CM

## 2024-04-19 DIAGNOSIS — I25.10 CORONARY ARTERY DISEASE INVOLVING NATIVE CORONARY ARTERY OF NATIVE HEART WITHOUT ANGINA PECTORIS: ICD-10-CM

## 2024-04-19 PROBLEM — E11.8 TYPE 2 DIABETES MELLITUS WITH COMPLICATION, WITHOUT LONG-TERM CURRENT USE OF INSULIN (HCC): Status: ACTIVE | Noted: 2024-04-19

## 2024-04-19 LAB
CHP ED QC CHECK: ABNORMAL
GLUCOSE BLD-MCNC: 160 MG/DL
HBA1C MFR BLD: 7.2 %

## 2024-04-19 SDOH — ECONOMIC STABILITY: FOOD INSECURITY: WITHIN THE PAST 12 MONTHS, THE FOOD YOU BOUGHT JUST DIDN'T LAST AND YOU DIDN'T HAVE MONEY TO GET MORE.: NEVER TRUE

## 2024-04-19 SDOH — ECONOMIC STABILITY: FOOD INSECURITY: WITHIN THE PAST 12 MONTHS, YOU WORRIED THAT YOUR FOOD WOULD RUN OUT BEFORE YOU GOT MONEY TO BUY MORE.: NEVER TRUE

## 2024-04-19 SDOH — ECONOMIC STABILITY: HOUSING INSECURITY
IN THE LAST 12 MONTHS, WAS THERE A TIME WHEN YOU DID NOT HAVE A STEADY PLACE TO SLEEP OR SLEPT IN A SHELTER (INCLUDING NOW)?: NO

## 2024-04-19 SDOH — ECONOMIC STABILITY: INCOME INSECURITY: HOW HARD IS IT FOR YOU TO PAY FOR THE VERY BASICS LIKE FOOD, HOUSING, MEDICAL CARE, AND HEATING?: NOT HARD AT ALL

## 2024-04-19 ASSESSMENT — ENCOUNTER SYMPTOMS
EYE PAIN: 0
BLOOD IN STOOL: 0
SORE THROAT: 0
TROUBLE SWALLOWING: 0
ABDOMINAL PAIN: 0
BACK PAIN: 0
NAUSEA: 0
COUGH: 0
CHEST TIGHTNESS: 0
CONSTIPATION: 0
SHORTNESS OF BREATH: 0

## 2024-04-19 ASSESSMENT — LIFESTYLE VARIABLES
HOW OFTEN DO YOU HAVE A DRINK CONTAINING ALCOHOL: NEVER
HOW MANY STANDARD DRINKS CONTAINING ALCOHOL DO YOU HAVE ON A TYPICAL DAY: PATIENT DOES NOT DRINK

## 2024-04-19 ASSESSMENT — PATIENT HEALTH QUESTIONNAIRE - PHQ9
1. LITTLE INTEREST OR PLEASURE IN DOING THINGS: NOT AT ALL
2. FEELING DOWN, DEPRESSED OR HOPELESS: NOT AT ALL
SUM OF ALL RESPONSES TO PHQ QUESTIONS 1-9: 0
SUM OF ALL RESPONSES TO PHQ QUESTIONS 1-9: 0
SUM OF ALL RESPONSES TO PHQ9 QUESTIONS 1 & 2: 0
SUM OF ALL RESPONSES TO PHQ QUESTIONS 1-9: 0
SUM OF ALL RESPONSES TO PHQ QUESTIONS 1-9: 0

## 2024-04-19 NOTE — PROGRESS NOTES
Medicare Annual Wellness Visit    Ravi Khan is here for Medicare AWV    Assessment & Plan       ICD-10-CM    1. Medicare annual wellness visit, subsequent  Z00.00       2. Type 2 diabetes mellitus without complication, without long-term current use of insulin (HCC)  E11.9 POCT Glucose     POCT glycosylated hemoglobin (Hb A1C)     [place] OFFICE OUTPATIENT VISIT 15 MINUTES [46399]      3. Coronary artery disease involving native coronary artery of native heart without angina pectoris  I25.10 [place] OFFICE OUTPATIENT VISIT 15 MINUTES [31008]      4. Autonomic orthostatic hypotension  I95.1 [place] OFFICE OUTPATIENT VISIT 15 MINUTES [06100]      5. Pure hypercholesterolemia  E78.00 [place] OFFICE OUTPATIENT VISIT 15 MINUTES [63821]      6. Primary hypertension  I10 [place] OFFICE OUTPATIENT VISIT 15 MINUTES [56512]      7. Hx of CABG  Z95.1       8. Lumbar stenosis with neurogenic claudication  M48.062 [place] OFFICE OUTPATIENT VISIT 15 MINUTES [63177]      9. Hypothyroidism due to acquired atrophy of thyroid  E03.4 [place] OFFICE OUTPATIENT VISIT 15 MINUTES [82160]      10. Primary osteoarthritis of both knees  M17.0              PLAn        Current Outpatient Medications   Medication Sig Dispense Refill    blood glucose test strips (TRUE METRIX BLOOD GLUCOSE TEST) strip Use to check blood sugar daily, Dx: E11.9 300 each 3    metoprolol tartrate (LOPRESSOR) 25 MG tablet TAKE 1/2 TABLET TWICE A DAY 90 tablet 1    glimepiride (AMARYL) 4 MG tablet TAKE 1/2 TABLET TWICE A DAY 90 tablet 1    simvastatin (ZOCOR) 10 MG tablet TAKE 1 TABLET NIGHTLY 90 tablet 1    levothyroxine (SYNTHROID) 25 MCG tablet TAKE 1 TABLET DAILY 90 tablet 1    tamsulosin (FLOMAX) 0.4 MG capsule TAKE 1 CAPSULE DAILY 90 capsule 1    metFORMIN (GLUCOPHAGE) 500 MG tablet TAKE 1 TABLET EVERY MORNINGAND TAKE 2 TABLETS EVERY   EVENING 270 tablet 1    TRUEplus Lancets 33G MISC TEST BLOOD SUGARS DAILY      polyethylene glycol (MIRALAX) 17 GM/SCOOP powder Take 17 g by mouth daily 510 g

## 2024-04-19 NOTE — TELEPHONE ENCOUNTER
Date of last visit:  1/12/2024  Date of next visit:  4/19/2024    Requested Prescriptions     Pending Prescriptions Disp Refills    levothyroxine (SYNTHROID) 25 MCG tablet [Pharmacy Med Name: LEVOTHYROXIN TAB 25MCG] 90 tablet 1     Sig: TAKE 1 TABLET DAILY    metFORMIN (GLUCOPHAGE) 500 MG tablet [Pharmacy Med Name: METFORMIN TAB 500MG] 270 tablet 1     Sig: TAKE 1 TABLET EVERY MORNINGAND TAKE 2 TABLETS EVERY   EVENING    tamsulosin (FLOMAX) 0.4 MG capsule [Pharmacy Med Name: TAMSULOSIN CAP 0.4MG] 90 capsule 1     Sig: TAKE 1 CAPSULE DAILY

## 2024-04-19 NOTE — TELEPHONE ENCOUNTER
----- Message from George Conway MD sent at 4/19/2024  5:56 AM EDT -----    Stable labs discuss at appt  today

## 2024-04-20 RX ORDER — LEVOTHYROXINE SODIUM 0.03 MG/1
TABLET ORAL
Qty: 90 TABLET | Refills: 1 | Status: SHIPPED | OUTPATIENT
Start: 2024-04-20

## 2024-04-20 RX ORDER — TAMSULOSIN HYDROCHLORIDE 0.4 MG/1
CAPSULE ORAL
Qty: 90 CAPSULE | Refills: 1 | Status: SHIPPED | OUTPATIENT
Start: 2024-04-20

## 2024-07-22 ENCOUNTER — OFFICE VISIT (OUTPATIENT)
Dept: FAMILY MEDICINE CLINIC | Age: 89
End: 2024-07-22

## 2024-07-22 VITALS
DIASTOLIC BLOOD PRESSURE: 62 MMHG | WEIGHT: 172 LBS | HEIGHT: 70 IN | HEART RATE: 78 BPM | SYSTOLIC BLOOD PRESSURE: 132 MMHG | BODY MASS INDEX: 24.62 KG/M2 | RESPIRATION RATE: 16 BRPM

## 2024-07-22 DIAGNOSIS — I25.10 CORONARY ARTERY DISEASE INVOLVING NATIVE CORONARY ARTERY OF NATIVE HEART WITHOUT ANGINA PECTORIS: ICD-10-CM

## 2024-07-22 DIAGNOSIS — E78.00 PURE HYPERCHOLESTEROLEMIA: ICD-10-CM

## 2024-07-22 DIAGNOSIS — E11.9 TYPE 2 DIABETES MELLITUS WITHOUT COMPLICATION, WITHOUT LONG-TERM CURRENT USE OF INSULIN (HCC): Primary | ICD-10-CM

## 2024-07-22 DIAGNOSIS — M48.062 LUMBAR STENOSIS WITH NEUROGENIC CLAUDICATION: ICD-10-CM

## 2024-07-22 DIAGNOSIS — I10 PRIMARY HYPERTENSION: ICD-10-CM

## 2024-07-22 DIAGNOSIS — I95.1 AUTONOMIC ORTHOSTATIC HYPOTENSION: ICD-10-CM

## 2024-07-22 DIAGNOSIS — N18.31 STAGE 3A CHRONIC KIDNEY DISEASE (HCC): ICD-10-CM

## 2024-07-22 DIAGNOSIS — E11.8 TYPE 2 DIABETES MELLITUS WITH COMPLICATION, WITHOUT LONG-TERM CURRENT USE OF INSULIN (HCC): ICD-10-CM

## 2024-07-22 DIAGNOSIS — Z95.1 HX OF CABG: ICD-10-CM

## 2024-07-22 DIAGNOSIS — E03.4 HYPOTHYROIDISM DUE TO ACQUIRED ATROPHY OF THYROID: ICD-10-CM

## 2024-07-22 PROBLEM — N18.30 CHRONIC KIDNEY DISEASE, STAGE 3 UNSPECIFIED (HCC): Status: ACTIVE | Noted: 2024-07-22

## 2024-07-22 LAB
CHP ED QC CHECK: ABNORMAL
GLUCOSE BLD-MCNC: 157 MG/DL
HBA1C MFR BLD: 7.1 %

## 2024-07-22 PROCEDURE — 1123F ACP DISCUSS/DSCN MKR DOCD: CPT | Performed by: FAMILY MEDICINE

## 2024-07-22 PROCEDURE — 99213 OFFICE O/P EST LOW 20 MIN: CPT | Performed by: FAMILY MEDICINE

## 2024-07-22 PROCEDURE — 82962 GLUCOSE BLOOD TEST: CPT | Performed by: FAMILY MEDICINE

## 2024-07-22 PROCEDURE — 1036F TOBACCO NON-USER: CPT | Performed by: FAMILY MEDICINE

## 2024-07-22 PROCEDURE — G8420 CALC BMI NORM PARAMETERS: HCPCS | Performed by: FAMILY MEDICINE

## 2024-07-22 PROCEDURE — G8427 DOCREV CUR MEDS BY ELIG CLIN: HCPCS | Performed by: FAMILY MEDICINE

## 2024-07-22 PROCEDURE — 83036 HEMOGLOBIN GLYCOSYLATED A1C: CPT | Performed by: FAMILY MEDICINE

## 2024-07-22 ASSESSMENT — ENCOUNTER SYMPTOMS
ABDOMINAL PAIN: 0
CONSTIPATION: 0
BLOOD IN STOOL: 0
BACK PAIN: 0
TROUBLE SWALLOWING: 0
EYE PAIN: 0
NAUSEA: 0
CHEST TIGHTNESS: 0
COUGH: 0
SHORTNESS OF BREATH: 0
SORE THROAT: 0

## 2024-07-22 NOTE — PROGRESS NOTES
palpitations and leg swelling.   Gastrointestinal:  Negative for abdominal pain, blood in stool, constipation and nausea.   Genitourinary:  Negative for difficulty urinating, frequency and urgency.   Musculoskeletal:  Negative for arthralgias, back pain, joint swelling, myalgias and neck stiffness.   Skin:  Negative for rash.   Neurological:  Negative for dizziness, focal weakness, weakness and headaches.   Hematological:  Negative for adenopathy. Does not bruise/bleed easily.   Psychiatric/Behavioral:  Negative for behavioral problems, dysphoric mood and sleep disturbance.         /62 (Site: Right Upper Arm, Position: Sitting, Cuff Size: Medium Adult)   Pulse 78   Resp 16   Ht 1.778 m (5' 10\")   Wt 78 kg (172 lb)   BMI 24.68 kg/m²    Objective   Physical Exam  Vitals and nursing note reviewed.   Constitutional:       Appearance: He is well-developed.   HENT:      Head: Normocephalic and atraumatic.      Right Ear: External ear normal.      Left Ear: External ear normal.      Nose: Nose normal.   Eyes:      Conjunctiva/sclera: Conjunctivae normal.      Pupils: Pupils are equal, round, and reactive to light.      Comments: Fundi nl   Neck:      Thyroid: No thyromegaly.   Cardiovascular:      Rate and Rhythm: Normal rate and regular rhythm.      Heart sounds: Normal heart sounds.   Pulmonary:      Effort: Pulmonary effort is normal.      Breath sounds: Normal breath sounds. No wheezing or rales.   Abdominal:      General: Bowel sounds are normal.      Palpations: Abdomen is soft. There is no mass.      Tenderness: There is no abdominal tenderness.   Musculoskeletal:         General: Normal range of motion.      Cervical back: Normal range of motion and neck supple.   Lymphadenopathy:      Cervical: No cervical adenopathy.   Skin:     General: Skin is warm and dry.      Findings: No rash.   Neurological:      Mental Status: He is alert and oriented to person, place, and time.      Cranial Nerves: No cranial

## 2024-07-24 DIAGNOSIS — E11.8 TYPE 2 DIABETES MELLITUS WITH COMPLICATION, WITHOUT LONG-TERM CURRENT USE OF INSULIN (HCC): ICD-10-CM

## 2024-07-24 DIAGNOSIS — I10 ESSENTIAL HYPERTENSION: ICD-10-CM

## 2024-07-24 DIAGNOSIS — E78.00 PURE HYPERCHOLESTEROLEMIA: ICD-10-CM

## 2024-07-25 RX ORDER — GLIMEPIRIDE 4 MG/1
TABLET ORAL
Qty: 90 TABLET | Refills: 1 | Status: SHIPPED | OUTPATIENT
Start: 2024-07-25

## 2024-07-25 RX ORDER — SIMVASTATIN 10 MG
TABLET ORAL
Qty: 90 TABLET | Refills: 1 | Status: SHIPPED | OUTPATIENT
Start: 2024-07-25

## 2024-07-25 NOTE — TELEPHONE ENCOUNTER
Date of last visit:  7/22/2024  Date of next visit:  10/28/2024    Requested Prescriptions     Pending Prescriptions Disp Refills    simvastatin (ZOCOR) 10 MG tablet [Pharmacy Med Name: SIMVASTATIN  TAB 10MG] 90 tablet 1     Sig: TAKE 1 TABLET NIGHTLY    metoprolol tartrate (LOPRESSOR) 25 MG tablet [Pharmacy Med Name: METOPROL TAR TAB 25MG] 90 tablet 1     Sig: TAKE 1/2 TABLET TWICE A DAY    glimepiride (AMARYL) 4 MG tablet [Pharmacy Med Name: GLIMEPIRIDE  TAB 4MG] 90 tablet 1     Sig: TAKE 1/2 TABLET TWICE A DAY

## 2024-10-28 ENCOUNTER — OFFICE VISIT (OUTPATIENT)
Dept: FAMILY MEDICINE CLINIC | Age: 89
End: 2024-10-28

## 2024-10-28 VITALS
HEIGHT: 70 IN | SYSTOLIC BLOOD PRESSURE: 116 MMHG | DIASTOLIC BLOOD PRESSURE: 64 MMHG | RESPIRATION RATE: 18 BRPM | BODY MASS INDEX: 24.77 KG/M2 | WEIGHT: 173 LBS | HEART RATE: 96 BPM

## 2024-10-28 DIAGNOSIS — E78.00 PURE HYPERCHOLESTEROLEMIA: ICD-10-CM

## 2024-10-28 DIAGNOSIS — Z23 NEED FOR INFLUENZA VACCINATION: ICD-10-CM

## 2024-10-28 DIAGNOSIS — E11.8 TYPE 2 DIABETES MELLITUS WITH COMPLICATION, WITHOUT LONG-TERM CURRENT USE OF INSULIN (HCC): Primary | ICD-10-CM

## 2024-10-28 DIAGNOSIS — I10 PRIMARY HYPERTENSION: ICD-10-CM

## 2024-10-28 DIAGNOSIS — M48.062 LUMBAR STENOSIS WITH NEUROGENIC CLAUDICATION: ICD-10-CM

## 2024-10-28 DIAGNOSIS — I25.10 CORONARY ARTERY DISEASE INVOLVING NATIVE CORONARY ARTERY OF NATIVE HEART WITHOUT ANGINA PECTORIS: ICD-10-CM

## 2024-10-28 DIAGNOSIS — I95.1 AUTONOMIC ORTHOSTATIC HYPOTENSION: ICD-10-CM

## 2024-10-28 LAB
CHP ED QC CHECK: ABNORMAL
GLUCOSE BLD-MCNC: 200 MG/DL
HBA1C MFR BLD: 6.7 %

## 2024-10-28 PROCEDURE — 1123F ACP DISCUSS/DSCN MKR DOCD: CPT | Performed by: FAMILY MEDICINE

## 2024-10-28 PROCEDURE — 99214 OFFICE O/P EST MOD 30 MIN: CPT | Performed by: FAMILY MEDICINE

## 2024-10-28 PROCEDURE — 83036 HEMOGLOBIN GLYCOSYLATED A1C: CPT | Performed by: FAMILY MEDICINE

## 2024-10-28 PROCEDURE — 82962 GLUCOSE BLOOD TEST: CPT | Performed by: FAMILY MEDICINE

## 2024-10-28 PROCEDURE — G8427 DOCREV CUR MEDS BY ELIG CLIN: HCPCS | Performed by: FAMILY MEDICINE

## 2024-10-28 PROCEDURE — G0008 ADMIN INFLUENZA VIRUS VAC: HCPCS | Performed by: FAMILY MEDICINE

## 2024-10-28 PROCEDURE — G8420 CALC BMI NORM PARAMETERS: HCPCS | Performed by: FAMILY MEDICINE

## 2024-10-28 ASSESSMENT — ENCOUNTER SYMPTOMS
CHEST TIGHTNESS: 0
NAUSEA: 0
CONSTIPATION: 0
SORE THROAT: 0
TROUBLE SWALLOWING: 0
ABDOMINAL PAIN: 0
SHORTNESS OF BREATH: 0
EYE PAIN: 0
BLOOD IN STOOL: 0
COUGH: 0
BACK PAIN: 0

## 2024-10-28 ASSESSMENT — PATIENT HEALTH QUESTIONNAIRE - PHQ9
SUM OF ALL RESPONSES TO PHQ9 QUESTIONS 1 & 2: 0
1. LITTLE INTEREST OR PLEASURE IN DOING THINGS: NOT AT ALL
SUM OF ALL RESPONSES TO PHQ QUESTIONS 1-9: 0
2. FEELING DOWN, DEPRESSED OR HOPELESS: NOT AT ALL
SUM OF ALL RESPONSES TO PHQ QUESTIONS 1-9: 0

## 2024-10-28 NOTE — PROGRESS NOTES
Subjective   Patient ID: Ravi Khan is a 89 y.o. male.      Right knee   wors  than  left     Diabetes  He presents for his follow-up diabetic visit. He has type 2 diabetes mellitus. His disease course has been stable. Pertinent negatives for hypoglycemia include no dizziness or headaches. There are no diabetic associated symptoms. Pertinent negatives for diabetes include no chest pain, no fatigue and no weakness. There are no hypoglycemic complications. Symptoms are stable. There are no diabetic complications. His weight is stable. Meal planning includes avoidance of concentrated sweets. His breakfast blood glucose is taken between 8-9 am. His breakfast blood glucose range is generally 110-130 mg/dl. An ACE inhibitor/angiotensin II receptor blocker is not being taken.   Hypertension  This is a chronic problem. The current episode started more than 1 year ago. The problem has been resolved since onset. The problem is controlled. Pertinent negatives include no chest pain, headaches, palpitations or shortness of breath. The current treatment provides significant improvement. There are no compliance problems.      Past Medical History:   Diagnosis Date    CAD (coronary artery disease)     Colon polyp     Hyperlipidemia     Hypertension     S/P CABG (coronary artery bypass graft)     Type II or unspecified type diabetes mellitus without mention of complication, not stated as uncontrolled      Past Surgical History:   Procedure Laterality Date    CARPAL TUNNEL RELEASE  1995 and 1995    left and  right     COLONOSCOPY  2009    dr. decker and due 2014    CORONARY ARTERY BYPASS GRAFT  2005    HERNIA REPAIR      LUMBAR SPINE SURGERY  11/2019      L4  and  L5   kashmen   at   [facility]     TOTAL KNEE ARTHROPLASTY Left 06/03/2019    Dr Marlow @ [facility]      Social History     Socioeconomic History    Marital status:      Spouse name: Not on file    Number of children: Not on file    Years of education: Not on file

## 2024-10-28 NOTE — PROGRESS NOTES
Immunizations Administered       Name Date Dose Route    Influenza, AFLURIA, FLUZONE, (age4 y+), IM, Trivalent MDV, 0.5mL 10/28/2024 0.5 mL Intramuscular    Site: Deltoid- Left    Lot: [id number]    NDC: 68028-084-98

## 2024-10-28 NOTE — PROGRESS NOTES
No components found for: \"CHLPL\"  Lab Results   Component Value Date    TRIG 106 04/20/2023     Lab Results   Component Value Date    HDL 43 04/20/2023     No components found for: \"LDLCALC\"  No components found for: \"LABVLDL\"    Lab Results   Component Value Date    ALT 13 04/18/2024    AST 13 04/18/2024    ALKPHOS 80 04/18/2024    BILITOT 0.9 04/18/2024           Is patient currently taking any cholesterol medications?     Yes   If yes, see med list as above    Is the patient reporting any side effects of cholesterol medications?   No    Is the patient taking any over the counter medications?    Yes   If yes, see med list as above    Is the patient taking a daily aspirin?    Yes      Patient Self-Management Goal for Chronic Condition  Goal: I will take all medications as prescribed by my doctor, and I will call the office if I am having any medication problems.  Barriers to success: none  Plan for overcoming my barriers: N/A     Confidence: 10/10  Date goal set: 10/28/24  Date goal attained:     Have you seen any other physician or provider since your last visit no    Have you had any other diagnostic tests since your last visit? no    Have you changed or stopped any medications since your last visit including any over-the-counter medicines, vitamins, or herbal medicines? no     Are you taking all your prescribed medications? Yes    If NO, why?

## 2024-10-31 DIAGNOSIS — N40.0 BENIGN PROSTATIC HYPERPLASIA WITHOUT LOWER URINARY TRACT SYMPTOMS: ICD-10-CM

## 2024-10-31 DIAGNOSIS — E03.4 HYPOTHYROIDISM DUE TO ACQUIRED ATROPHY OF THYROID: ICD-10-CM

## 2024-10-31 DIAGNOSIS — E11.9 TYPE 2 DIABETES MELLITUS WITHOUT COMPLICATION, WITHOUT LONG-TERM CURRENT USE OF INSULIN (HCC): ICD-10-CM

## 2024-11-01 RX ORDER — TAMSULOSIN HYDROCHLORIDE 0.4 MG/1
CAPSULE ORAL
Qty: 90 CAPSULE | Refills: 1 | Status: SHIPPED | OUTPATIENT
Start: 2024-11-01

## 2024-11-01 RX ORDER — LEVOTHYROXINE SODIUM 25 UG/1
TABLET ORAL
Qty: 90 TABLET | Refills: 1 | Status: SHIPPED | OUTPATIENT
Start: 2024-11-01

## 2024-11-01 NOTE — TELEPHONE ENCOUNTER
The pharmacy is  requesting a refill of the below medication which has been pended for you:     Requested Prescriptions     Pending Prescriptions Disp Refills    metFORMIN (GLUCOPHAGE) 500 MG tablet [Pharmacy Med Name: METFORMIN TAB 500MG] 270 tablet 1     Sig: TAKE 1 TABLET EVERY MORNINGAND TAKE 2 TABLETS EVERY   EVENING    tamsulosin (FLOMAX) 0.4 MG capsule [Pharmacy Med Name: TAMSULOSIN CAP 0.4MG] 90 capsule 1     Sig: TAKE 1 CAPSULE DAILY    levothyroxine (SYNTHROID) 25 MCG tablet [Pharmacy Med Name: LEVOTHYROXIN TAB 25MCG] 90 tablet 1     Sig: TAKE 1 TABLET DAILY       Last Appointment Date: 10/28/2024  Next Appointment Date: 2/7/2025    No Known Allergies

## 2024-12-19 ENCOUNTER — HOSPITAL ENCOUNTER (INPATIENT)
Age: 89
LOS: 8 days | Discharge: NURSING, REHAB OR LONG-TERM CARE | End: 2024-12-27
Attending: EMERGENCY MEDICINE
Payer: MEDICARE

## 2024-12-19 ENCOUNTER — APPOINTMENT (OUTPATIENT)
Dept: GENERAL RADIOLOGY | Age: 89
End: 2024-12-19
Payer: MEDICARE

## 2024-12-19 ENCOUNTER — APPOINTMENT (OUTPATIENT)
Dept: CT IMAGING | Age: 89
End: 2024-12-19
Payer: MEDICARE
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
## 2024-12-19 DIAGNOSIS — U07.1 COVID-19 VIRUS DETECTED: Primary | ICD-10-CM

## 2024-12-19 DIAGNOSIS — M62.82 RHABDOMYOLYSIS DUE TO COVID-19: ICD-10-CM

## 2024-12-19 DIAGNOSIS — R33.9 URINARY RETENTION: ICD-10-CM

## 2024-12-19 DIAGNOSIS — I95.9 HYPOTENSION, UNSPECIFIED HYPOTENSION TYPE: ICD-10-CM

## 2024-12-19 DIAGNOSIS — U07.1 RHABDOMYOLYSIS DUE TO COVID-19: ICD-10-CM

## 2024-12-19 DIAGNOSIS — N18.31 STAGE 3A CHRONIC KIDNEY DISEASE (HCC): ICD-10-CM

## 2024-12-19 DIAGNOSIS — I25.10 CORONARY ARTERY DISEASE INVOLVING NATIVE HEART WITHOUT ANGINA PECTORIS, UNSPECIFIED VESSEL OR LESION TYPE: ICD-10-CM

## 2024-12-19 DIAGNOSIS — R41.0 DISORIENTATION: ICD-10-CM

## 2024-12-19 DIAGNOSIS — D53.9 MACROCYTIC ANEMIA: ICD-10-CM

## 2024-12-19 LAB
ALBUMIN SERPL BCG-MCNC: 4.2 G/DL (ref 3.5–5.1)
ALP SERPL-CCNC: 67 U/L (ref 38–126)
ALT SERPL W/O P-5'-P-CCNC: 23 U/L (ref 11–66)
ANION GAP SERPL CALC-SCNC: 16 MEQ/L (ref 8–16)
AST SERPL-CCNC: 59 U/L (ref 5–40)
BASOPHILS ABSOLUTE: 0 THOU/MM3 (ref 0–0.1)
BASOPHILS NFR BLD AUTO: 0.2 %
BILIRUB SERPL-MCNC: 1.3 MG/DL (ref 0.3–1.2)
BUN SERPL-MCNC: 45 MG/DL (ref 7–22)
CALCIUM SERPL-MCNC: 9.1 MG/DL (ref 8.5–10.5)
CHLORIDE SERPL-SCNC: 100 MEQ/L (ref 98–111)
CHP ED QC CHECK: YES
CO2 SERPL-SCNC: 20 MEQ/L (ref 23–33)
CREAT SERPL-MCNC: 1.2 MG/DL (ref 0.4–1.2)
DEPRECATED RDW RBC AUTO: 41.3 FL (ref 35–45)
EOSINOPHIL NFR BLD AUTO: 0 %
EOSINOPHILS ABSOLUTE: 0 THOU/MM3 (ref 0–0.4)
ERYTHROCYTE [DISTWIDTH] IN BLOOD BY AUTOMATED COUNT: 12.2 % (ref 11.5–14.5)
FLUAV RNA RESP QL NAA+PROBE: NOT DETECTED
FLUBV RNA RESP QL NAA+PROBE: NOT DETECTED
GFR SERPL CREATININE-BSD FRML MDRD: 58 ML/MIN/1.73M2
GLUCOSE BLD STRIP.AUTO-MCNC: 188 MG/DL (ref 70–108)
GLUCOSE SERPL-MCNC: 236 MG/DL (ref 70–108)
HCT VFR BLD AUTO: 43.4 % (ref 42–52)
HGB BLD-MCNC: 14 GM/DL (ref 14–18)
IMM GRANULOCYTES # BLD AUTO: 0.04 THOU/MM3 (ref 0–0.07)
IMM GRANULOCYTES NFR BLD AUTO: 0.4 %
LACTIC ACID, SEPSIS: 2.1 MMOL/L (ref 0.5–1.9)
LACTIC ACID, SEPSIS: 2.3 MMOL/L (ref 0.5–1.9)
LYMPHOCYTES ABSOLUTE: 0.7 THOU/MM3 (ref 1–4.8)
LYMPHOCYTES NFR BLD AUTO: 8 %
MCH RBC QN AUTO: 29.9 PG (ref 26–33)
MCHC RBC AUTO-ENTMCNC: 32.3 GM/DL (ref 32.2–35.5)
MCV RBC AUTO: 92.5 FL (ref 80–94)
MONOCYTES ABSOLUTE: 1.2 THOU/MM3 (ref 0.4–1.3)
MONOCYTES NFR BLD AUTO: 12.7 %
NEUTROPHILS ABSOLUTE: 7.3 THOU/MM3 (ref 1.8–7.7)
NEUTROPHILS NFR BLD AUTO: 78.7 %
NRBC BLD AUTO-RTO: 0 /100 WBC
OSMOLALITY SERPL CALC.SUM OF ELEC: 291.1 MOSMOL/KG (ref 275–300)
PLATELET # BLD AUTO: 148 THOU/MM3 (ref 130–400)
PMV BLD AUTO: 9.1 FL (ref 9.4–12.4)
POTASSIUM SERPL-SCNC: 4.3 MEQ/L (ref 3.5–5.2)
PROCALCITONIN SERPL IA-MCNC: 0.41 NG/ML (ref 0.01–0.09)
PROT SERPL-MCNC: 6.5 G/DL (ref 6.1–8)
RBC # BLD AUTO: 4.69 MILL/MM3 (ref 4.7–6.1)
SARS-COV-2 RNA RESP QL NAA+PROBE: DETECTED
SODIUM SERPL-SCNC: 136 MEQ/L (ref 135–145)
TROPONIN, HIGH SENSITIVITY: 37 NG/L (ref 0–12)
WBC # BLD AUTO: 9.3 THOU/MM3 (ref 4.8–10.8)

## 2024-12-19 PROCEDURE — 99285 EMERGENCY DEPT VISIT HI MDM: CPT

## 2024-12-19 PROCEDURE — 2140000000 HC CCU INTERMEDIATE R&B

## 2024-12-19 PROCEDURE — 36415 COLL VENOUS BLD VENIPUNCTURE: CPT

## 2024-12-19 PROCEDURE — 82550 ASSAY OF CK (CPK): CPT

## 2024-12-19 PROCEDURE — 83605 ASSAY OF LACTIC ACID: CPT

## 2024-12-19 PROCEDURE — 87040 BLOOD CULTURE FOR BACTERIA: CPT

## 2024-12-19 PROCEDURE — 70450 CT HEAD/BRAIN W/O DYE: CPT

## 2024-12-19 PROCEDURE — 93005 ELECTROCARDIOGRAM TRACING: CPT | Performed by: EMERGENCY MEDICINE

## 2024-12-19 PROCEDURE — 80053 COMPREHEN METABOLIC PANEL: CPT

## 2024-12-19 PROCEDURE — 2580000003 HC RX 258: Performed by: EMERGENCY MEDICINE

## 2024-12-19 PROCEDURE — 72125 CT NECK SPINE W/O DYE: CPT

## 2024-12-19 PROCEDURE — 99223 1ST HOSP IP/OBS HIGH 75: CPT

## 2024-12-19 PROCEDURE — 72170 X-RAY EXAM OF PELVIS: CPT

## 2024-12-19 PROCEDURE — 84145 PROCALCITONIN (PCT): CPT

## 2024-12-19 PROCEDURE — 87636 SARSCOV2 & INF A&B AMP PRB: CPT

## 2024-12-19 PROCEDURE — 72100 X-RAY EXAM L-S SPINE 2/3 VWS: CPT

## 2024-12-19 PROCEDURE — 84484 ASSAY OF TROPONIN QUANT: CPT

## 2024-12-19 PROCEDURE — 71045 X-RAY EXAM CHEST 1 VIEW: CPT

## 2024-12-19 PROCEDURE — 85025 COMPLETE CBC W/AUTO DIFF WBC: CPT

## 2024-12-19 PROCEDURE — 82948 REAGENT STRIP/BLOOD GLUCOSE: CPT

## 2024-12-19 RX ORDER — POTASSIUM CHLORIDE 7.45 MG/ML
10 INJECTION INTRAVENOUS PRN
Status: ACTIVE | OUTPATIENT
Start: 2024-12-19 | End: 2024-12-22

## 2024-12-19 RX ORDER — SODIUM CHLORIDE 9 MG/ML
INJECTION, SOLUTION INTRAVENOUS PRN
Status: DISCONTINUED | OUTPATIENT
Start: 2024-12-19 | End: 2024-12-27 | Stop reason: HOSPADM

## 2024-12-19 RX ORDER — POTASSIUM CHLORIDE 1500 MG/1
40 TABLET, EXTENDED RELEASE ORAL PRN
Status: ACTIVE | OUTPATIENT
Start: 2024-12-19 | End: 2024-12-22

## 2024-12-19 RX ORDER — DEXTROSE MONOHYDRATE 100 MG/ML
INJECTION, SOLUTION INTRAVENOUS CONTINUOUS PRN
Status: DISCONTINUED | OUTPATIENT
Start: 2024-12-19 | End: 2024-12-27 | Stop reason: HOSPADM

## 2024-12-19 RX ORDER — SODIUM CHLORIDE 0.9 % (FLUSH) 0.9 %
5-40 SYRINGE (ML) INJECTION PRN
Status: DISCONTINUED | OUTPATIENT
Start: 2024-12-19 | End: 2024-12-27 | Stop reason: HOSPADM

## 2024-12-19 RX ORDER — ACETAMINOPHEN 325 MG/1
650 TABLET ORAL EVERY 6 HOURS PRN
Status: DISCONTINUED | OUTPATIENT
Start: 2024-12-19 | End: 2024-12-27 | Stop reason: HOSPADM

## 2024-12-19 RX ORDER — ACETAMINOPHEN 650 MG/1
650 SUPPOSITORY RECTAL EVERY 6 HOURS PRN
Status: DISCONTINUED | OUTPATIENT
Start: 2024-12-19 | End: 2024-12-27 | Stop reason: HOSPADM

## 2024-12-19 RX ORDER — ONDANSETRON 2 MG/ML
4 INJECTION INTRAMUSCULAR; INTRAVENOUS EVERY 6 HOURS PRN
Status: DISCONTINUED | OUTPATIENT
Start: 2024-12-19 | End: 2024-12-27 | Stop reason: HOSPADM

## 2024-12-19 RX ORDER — GLUCAGON 1 MG/ML
1 KIT INJECTION PRN
Status: DISCONTINUED | OUTPATIENT
Start: 2024-12-19 | End: 2024-12-27 | Stop reason: HOSPADM

## 2024-12-19 RX ORDER — POLYETHYLENE GLYCOL 3350 17 G/17G
17 POWDER, FOR SOLUTION ORAL DAILY PRN
Status: DISCONTINUED | OUTPATIENT
Start: 2024-12-19 | End: 2024-12-27 | Stop reason: HOSPADM

## 2024-12-19 RX ORDER — SODIUM CHLORIDE 0.9 % (FLUSH) 0.9 %
5-40 SYRINGE (ML) INJECTION EVERY 12 HOURS SCHEDULED
Status: DISCONTINUED | OUTPATIENT
Start: 2024-12-20 | End: 2024-12-27 | Stop reason: HOSPADM

## 2024-12-19 RX ORDER — 0.9 % SODIUM CHLORIDE 0.9 %
1000 INTRAVENOUS SOLUTION INTRAVENOUS ONCE
Status: COMPLETED | OUTPATIENT
Start: 2024-12-19 | End: 2024-12-20

## 2024-12-19 RX ORDER — ENOXAPARIN SODIUM 100 MG/ML
40 INJECTION SUBCUTANEOUS DAILY
Status: DISCONTINUED | OUTPATIENT
Start: 2024-12-20 | End: 2024-12-27 | Stop reason: HOSPADM

## 2024-12-19 RX ORDER — ONDANSETRON 4 MG/1
4 TABLET, ORALLY DISINTEGRATING ORAL EVERY 8 HOURS PRN
Status: DISCONTINUED | OUTPATIENT
Start: 2024-12-19 | End: 2024-12-27 | Stop reason: HOSPADM

## 2024-12-19 RX ORDER — MAGNESIUM SULFATE IN WATER 40 MG/ML
2000 INJECTION, SOLUTION INTRAVENOUS PRN
Status: DISCONTINUED | OUTPATIENT
Start: 2024-12-19 | End: 2024-12-27 | Stop reason: HOSPADM

## 2024-12-19 RX ORDER — INSULIN LISPRO 100 [IU]/ML
0-4 INJECTION, SOLUTION INTRAVENOUS; SUBCUTANEOUS EVERY 6 HOURS
Status: DISCONTINUED | OUTPATIENT
Start: 2024-12-20 | End: 2024-12-27 | Stop reason: HOSPADM

## 2024-12-19 RX ADMIN — SODIUM CHLORIDE 1000 ML: 9 INJECTION, SOLUTION INTRAVENOUS at 22:14

## 2024-12-19 ASSESSMENT — PAIN - FUNCTIONAL ASSESSMENT: PAIN_FUNCTIONAL_ASSESSMENT: 0-10

## 2024-12-20 ENCOUNTER — APPOINTMENT (OUTPATIENT)
Dept: GENERAL RADIOLOGY | Age: 89
End: 2024-12-20
Payer: MEDICARE

## 2024-12-20 LAB
ANION GAP SERPL CALC-SCNC: 13 MEQ/L (ref 8–16)
BACTERIA URNS QL MICRO: ABNORMAL /HPF
BASOPHILS ABSOLUTE: 0 THOU/MM3 (ref 0–0.1)
BASOPHILS NFR BLD AUTO: 0.3 %
BILIRUB UR QL STRIP.AUTO: NEGATIVE
BUN SERPL-MCNC: 48 MG/DL (ref 7–22)
CALCIUM SERPL-MCNC: 8.4 MG/DL (ref 8.5–10.5)
CASTS #/AREA URNS LPF: ABNORMAL /LPF
CASTS 2: ABNORMAL /LPF
CHARACTER UR: CLEAR
CHLORIDE SERPL-SCNC: 111 MEQ/L (ref 98–111)
CK SERPL-CCNC: 1122 U/L (ref 55–170)
CK SERPL-CCNC: 1758 U/L (ref 55–170)
CO2 SERPL-SCNC: 20 MEQ/L (ref 23–33)
COLOR, UA: YELLOW
CREAT SERPL-MCNC: 1.3 MG/DL (ref 0.4–1.2)
CRP SERPL-MCNC: 7.19 MG/DL (ref 0–1)
CRYSTALS URNS MICRO: ABNORMAL
DEPRECATED RDW RBC AUTO: 42.5 FL (ref 35–45)
EKG ATRIAL RATE: 76 BPM
EKG P AXIS: 4 DEGREES
EKG P-R INTERVAL: 206 MS
EKG Q-T INTERVAL: 406 MS
EKG QRS DURATION: 82 MS
EKG QTC CALCULATION (BAZETT): 456 MS
EKG R AXIS: -11 DEGREES
EKG T AXIS: 76 DEGREES
EKG VENTRICULAR RATE: 76 BPM
EOSINOPHIL NFR BLD AUTO: 0.3 %
EOSINOPHILS ABSOLUTE: 0 THOU/MM3 (ref 0–0.4)
EPITHELIAL CELLS, UA: ABNORMAL /HPF
ERYTHROCYTE [DISTWIDTH] IN BLOOD BY AUTOMATED COUNT: 12.3 % (ref 11.5–14.5)
GFR SERPL CREATININE-BSD FRML MDRD: 53 ML/MIN/1.73M2
GLUCOSE BLD STRIP.AUTO-MCNC: 102 MG/DL (ref 70–108)
GLUCOSE BLD STRIP.AUTO-MCNC: 134 MG/DL (ref 70–108)
GLUCOSE BLD STRIP.AUTO-MCNC: 160 MG/DL (ref 70–108)
GLUCOSE BLD STRIP.AUTO-MCNC: 161 MG/DL (ref 70–108)
GLUCOSE BLD STRIP.AUTO-MCNC: 192 MG/DL (ref 70–108)
GLUCOSE SERPL-MCNC: 105 MG/DL (ref 70–108)
GLUCOSE UR QL STRIP.AUTO: NEGATIVE MG/DL
HCT VFR BLD AUTO: 36.8 % (ref 42–52)
HGB BLD-MCNC: 12 GM/DL (ref 14–18)
HGB UR QL STRIP.AUTO: ABNORMAL
IMM GRANULOCYTES # BLD AUTO: 0.02 THOU/MM3 (ref 0–0.07)
IMM GRANULOCYTES NFR BLD AUTO: 0.3 %
KETONES UR QL STRIP.AUTO: ABNORMAL
LACTATE SERPL-SCNC: 1.1 MMOL/L (ref 0.5–2)
LYMPHOCYTES ABSOLUTE: 1.4 THOU/MM3 (ref 1–4.8)
LYMPHOCYTES NFR BLD AUTO: 19.4 %
MAGNESIUM SERPL-MCNC: 1.6 MG/DL (ref 1.6–2.4)
MCH RBC QN AUTO: 30.7 PG (ref 26–33)
MCHC RBC AUTO-ENTMCNC: 32.6 GM/DL (ref 32.2–35.5)
MCV RBC AUTO: 94.1 FL (ref 80–94)
MISCELLANEOUS 2: ABNORMAL
MONOCYTES ABSOLUTE: 1 THOU/MM3 (ref 0.4–1.3)
MONOCYTES NFR BLD AUTO: 13.6 %
NEUTROPHILS ABSOLUTE: 4.6 THOU/MM3 (ref 1.8–7.7)
NEUTROPHILS NFR BLD AUTO: 66.1 %
NITRITE UR QL STRIP: NEGATIVE
NRBC BLD AUTO-RTO: 0 /100 WBC
PH UR STRIP.AUTO: 5 [PH] (ref 5–9)
PLATELET # BLD AUTO: 136 THOU/MM3 (ref 130–400)
PMV BLD AUTO: 9.5 FL (ref 9.4–12.4)
POTASSIUM SERPL-SCNC: 4.2 MEQ/L (ref 3.5–5.2)
PROT UR STRIP.AUTO-MCNC: 100 MG/DL
RBC # BLD AUTO: 3.91 MILL/MM3 (ref 4.7–6.1)
RBC URINE: ABNORMAL /HPF
RENAL EPI CELLS #/AREA URNS HPF: ABNORMAL /[HPF]
SODIUM SERPL-SCNC: 144 MEQ/L (ref 135–145)
SP GR UR REFRACT.AUTO: 1.02 (ref 1–1.03)
TROPONIN, HIGH SENSITIVITY: 43 NG/L (ref 0–12)
UROBILINOGEN, URINE: 0.2 EU/DL (ref 0–1)
WBC # BLD AUTO: 7 THOU/MM3 (ref 4.8–10.8)
WBC #/AREA URNS HPF: ABNORMAL /HPF
WBC #/AREA URNS HPF: NEGATIVE /[HPF]
YEAST LIKE FUNGI URNS QL MICRO: ABNORMAL

## 2024-12-20 PROCEDURE — 99233 SBSQ HOSP IP/OBS HIGH 50: CPT | Performed by: INTERNAL MEDICINE

## 2024-12-20 PROCEDURE — 6370000000 HC RX 637 (ALT 250 FOR IP)

## 2024-12-20 PROCEDURE — 86140 C-REACTIVE PROTEIN: CPT

## 2024-12-20 PROCEDURE — 51701 INSERT BLADDER CATHETER: CPT

## 2024-12-20 PROCEDURE — 80048 BASIC METABOLIC PNL TOTAL CA: CPT

## 2024-12-20 PROCEDURE — 2580000003 HC RX 258

## 2024-12-20 PROCEDURE — 81001 URINALYSIS AUTO W/SCOPE: CPT

## 2024-12-20 PROCEDURE — 2140000000 HC CCU INTERMEDIATE R&B

## 2024-12-20 PROCEDURE — 51798 US URINE CAPACITY MEASURE: CPT

## 2024-12-20 PROCEDURE — 6360000002 HC RX W HCPCS

## 2024-12-20 PROCEDURE — 71045 X-RAY EXAM CHEST 1 VIEW: CPT

## 2024-12-20 PROCEDURE — 82550 ASSAY OF CK (CPK): CPT

## 2024-12-20 PROCEDURE — 97166 OT EVAL MOD COMPLEX 45 MIN: CPT

## 2024-12-20 PROCEDURE — 83735 ASSAY OF MAGNESIUM: CPT

## 2024-12-20 PROCEDURE — 82948 REAGENT STRIP/BLOOD GLUCOSE: CPT

## 2024-12-20 PROCEDURE — 93010 ELECTROCARDIOGRAM REPORT: CPT | Performed by: INTERNAL MEDICINE

## 2024-12-20 PROCEDURE — 2500000003 HC RX 250 WO HCPCS

## 2024-12-20 PROCEDURE — 2580000003 HC RX 258: Performed by: INTERNAL MEDICINE

## 2024-12-20 PROCEDURE — 93005 ELECTROCARDIOGRAM TRACING: CPT

## 2024-12-20 PROCEDURE — 85025 COMPLETE CBC W/AUTO DIFF WBC: CPT

## 2024-12-20 PROCEDURE — 36415 COLL VENOUS BLD VENIPUNCTURE: CPT

## 2024-12-20 PROCEDURE — 83605 ASSAY OF LACTIC ACID: CPT

## 2024-12-20 PROCEDURE — 97530 THERAPEUTIC ACTIVITIES: CPT

## 2024-12-20 PROCEDURE — 84484 ASSAY OF TROPONIN QUANT: CPT

## 2024-12-20 RX ORDER — SODIUM CHLORIDE 9 MG/ML
INJECTION, SOLUTION INTRAVENOUS CONTINUOUS
Status: ACTIVE | OUTPATIENT
Start: 2024-12-20 | End: 2024-12-20

## 2024-12-20 RX ORDER — MIDODRINE HYDROCHLORIDE 5 MG/1
5 TABLET ORAL 2 TIMES DAILY WITH MEALS
Status: DISCONTINUED | OUTPATIENT
Start: 2024-12-20 | End: 2024-12-22

## 2024-12-20 RX ORDER — 0.9 % SODIUM CHLORIDE 0.9 %
500 INTRAVENOUS SOLUTION INTRAVENOUS ONCE
Status: COMPLETED | OUTPATIENT
Start: 2024-12-20 | End: 2024-12-20

## 2024-12-20 RX ORDER — TAMSULOSIN HYDROCHLORIDE 0.4 MG/1
0.4 CAPSULE ORAL DAILY
Status: DISCONTINUED | OUTPATIENT
Start: 2024-12-20 | End: 2024-12-27 | Stop reason: HOSPADM

## 2024-12-20 RX ORDER — ATORVASTATIN CALCIUM 10 MG/1
10 TABLET, FILM COATED ORAL DAILY
Status: DISCONTINUED | OUTPATIENT
Start: 2024-12-20 | End: 2024-12-20

## 2024-12-20 RX ORDER — LEVOTHYROXINE SODIUM 25 UG/1
25 TABLET ORAL DAILY
Status: DISCONTINUED | OUTPATIENT
Start: 2024-12-20 | End: 2024-12-27 | Stop reason: HOSPADM

## 2024-12-20 RX ORDER — ASPIRIN 81 MG/1
81 TABLET ORAL DAILY
Status: DISCONTINUED | OUTPATIENT
Start: 2024-12-20 | End: 2024-12-27 | Stop reason: HOSPADM

## 2024-12-20 RX ORDER — SODIUM CHLORIDE 9 MG/ML
INJECTION, SOLUTION INTRAVENOUS CONTINUOUS
Status: DISCONTINUED | OUTPATIENT
Start: 2024-12-20 | End: 2024-12-22

## 2024-12-20 RX ORDER — ASCORBIC ACID 500 MG
1000 TABLET ORAL DAILY
Status: DISCONTINUED | OUTPATIENT
Start: 2024-12-20 | End: 2024-12-27 | Stop reason: HOSPADM

## 2024-12-20 RX ADMIN — ENOXAPARIN SODIUM 40 MG: 100 INJECTION SUBCUTANEOUS at 08:37

## 2024-12-20 RX ADMIN — ATORVASTATIN CALCIUM 10 MG: 10 TABLET, FILM COATED ORAL at 08:37

## 2024-12-20 RX ADMIN — SODIUM CHLORIDE: 9 INJECTION, SOLUTION INTRAVENOUS at 14:34

## 2024-12-20 RX ADMIN — ASPIRIN 81 MG: 81 TABLET, COATED ORAL at 08:37

## 2024-12-20 RX ADMIN — INSULIN LISPRO 1 UNITS: 100 INJECTION, SOLUTION INTRAVENOUS; SUBCUTANEOUS at 12:09

## 2024-12-20 RX ADMIN — TAMSULOSIN HYDROCHLORIDE 0.4 MG: 0.4 CAPSULE ORAL at 08:37

## 2024-12-20 RX ADMIN — LEVOTHYROXINE SODIUM 25 MCG: 0.03 TABLET ORAL at 06:16

## 2024-12-20 RX ADMIN — SODIUM CHLORIDE 500 ML: 9 INJECTION, SOLUTION INTRAVENOUS at 12:36

## 2024-12-20 RX ADMIN — MAGNESIUM SULFATE HEPTAHYDRATE 2000 MG: 40 INJECTION, SOLUTION INTRAVENOUS at 06:10

## 2024-12-20 RX ADMIN — SODIUM CHLORIDE: 9 INJECTION, SOLUTION INTRAVENOUS at 02:52

## 2024-12-20 RX ADMIN — OXYCODONE HYDROCHLORIDE AND ACETAMINOPHEN 1000 MG: 500 TABLET ORAL at 08:37

## 2024-12-20 RX ADMIN — SODIUM CHLORIDE, PRESERVATIVE FREE 10 ML: 5 INJECTION INTRAVENOUS at 08:37

## 2024-12-20 NOTE — SIGNIFICANT EVENT
Upon assessment patient is obtunded, but does respond to pain. His blood pressure is borderline however and I spoke with ICU providers about patient needing to be reassessed. Dr. Enamorado notified that patient will need further IVF and if OK from ICU standpoint will admit to stepdown when appropriate. Will await results from IVF while in ED before dispo.

## 2024-12-20 NOTE — ED NOTES
This RN in to round. Pt is alert to verbal stimulation and is at this time oriented to self situation and setting. RR regular and unlabored. Call light in reach.

## 2024-12-20 NOTE — ED PROVIDER NOTES
[facility]  EMERGENCY DEPARTMENT ENCOUNTER          Pt Name: Ravi Khan  MRN: [id number]  Birthdate 1935  Date of evaluation: 12/19/2024  Emergency Physician: Carolyn Enamorado MD    CHIEF COMPLAINT       Chief Complaint   Patient presents with    Fatigue    Altered Mental Status     History obtained from the patient and son.      HISTORY OF PRESENT ILLNESS    HPI  Ravi Khan is a 89 y.o. male who presents to the emergency department for evaluation of altered mental status.  Patient resides with his wife at home.  Son last spoke to him on Sunday.  He was found on the floor today after a fall.  Patient is unable to tell me how long he has been on the floor.  He does state he is having low back pain, he is unsure if he hit his head.  Son states that the family has been ill with upper respiratory infection, and he is unsure if his father has developed symptoms.  The patient has no other acute complaints at this time.          REVIEW OF SYSTEMS   Review of Systems   Constitutional:  Positive for fatigue. Negative for chills and fever.   HENT:  Negative for ear pain and sore throat.    Respiratory:  Negative for cough and shortness of breath.    Cardiovascular:  Negative for chest pain and palpitations.   Gastrointestinal:  Negative for abdominal pain, diarrhea and vomiting.   Genitourinary:  Negative for dysuria.   Musculoskeletal:  Positive for back pain.   Skin:  Negative for rash and wound.   Neurological:  Negative for weakness and headaches.   Psychiatric/Behavioral:  Positive for confusion.          PAST MEDICAL AND SURGICAL HISTORY     Past Medical History:   Diagnosis Date    CAD (coronary artery disease)     Colon polyp     Hyperlipidemia     Hypertension     S/P CABG (coronary artery bypass graft)     Type II or unspecified type diabetes mellitus without mention of complication, not stated as uncontrolled      Past Surgical History:   Procedure Laterality Date     parts may have been transcribed with the assistance of an ED scribe. The transcription may contain errors not detected in proofreading.    Electronically Signed: Carolyn Enamorado MD, 12/20/24, 1:29 AM          Carolyn Enamorado MD  12/20/24 0129

## 2024-12-20 NOTE — PROGRESS NOTES
Physician Progress Note      PATIENT:               EVANS MCKINNEY  CSN #:                  [id number]  :                       1935  ADMIT DATE:       2024 6:46 PM  DISCH DATE:  RESPONDING  PROVIDER #:        Hesham Dewey MD          QUERY TEXT:    Pt admitted with Covid-19. Pt noted to have AMS. If possible, please document   in the progress notes and discharge summary if you are evaluating and / or   treating any of the following:    The medical record reflects the following:  Risk Factors: Covid-19  Clinical Indicators: IM note on admission \"Upon assessment patient is   obtunded, but does respond to pain. His blood pressure is borderline however   and I spoke with ICU providers about patient needing to be reassessed. Dr. Enamorado notified that patient will need further IVF and if OK from ICU   standpoint will admit to stepdown when appropriate.\"  H&P states \"AMS: likely   2/2 COVID infection.  It is reported that the patient was found down on the   ground by his son today after having an unwitnessed fall. Patient is   non-contributory to HPI due to AMS. Son states that patient is norm  Treatment: labs, imaging, IVF bolus, stepdown  Options provided:  -- Metabolic encephalopathy  -- Other - I will add my own diagnosis  -- Disagree - Not applicable / Not valid  -- Disagree - Clinically unable to determine / Unknown  -- Refer to Clinical Documentation Reviewer    PROVIDER RESPONSE TEXT:    This patient has metabolic encephalopathy.    Query created by: Negar Eaton on 2024 4:43 PM      Electronically signed by:  Hesham Dewey MD 2024 6:50 PM

## 2024-12-20 NOTE — PROGRESS NOTES
standardized testing, formal and informal observation of tasks, assessment of data and development of plan of care and goals.  Treatment time included skilled education and facilitation of tasks to increase safety and independence with ADL's for improved functional independence and quality of life.    Patient Education:          Patient Education  Education Given To: Patient  Education Provided: Role of Therapy;Plan of Care  Education Method: Demonstration;Verbal  Barriers to Learning: Cognition  Education Outcome: Verbalized understanding;Demonstrated understanding    Plan:  Times Per Week: 5x  Current Treatment Recommendations: Strengthening, Balance training, Functional mobility training, Endurance training, Safety education & training, Patient/Caregiver education & training, Cognitive reorientation, Equipment evaluation, education, & procurement, Self-Care / ADL.  See long-term goal time frame for expected duration of plan of care.  If no long-term goals established, a short length of stay is anticipated.    Goals:  Patient goals : return home with wife  Short Term Goals  Time Frame for Short Term Goals: until discharge  Short Term Goal 1: Patient will safely complete various functional transfers with SBA to improve safety in living environment.  Short Term Goal 2: Patient will improve functional ambulation to/from bathroom with CGA and LRAD in prep for return to PLOF.  Short Term Goal 3: Patient will complete UB/LB dressing/bathing with CGA and use of LHAE prn and energy conservation techniques to improve ease in BADL routine.  Short Term Goal 4: Patient will tolerate standing 4-6 minutes with unilateral UE release reaching ouside base of support with SBA to complete sink side grooming.  Long Term Goals  Time Frame for Long Term Goals : None due to ELOS    AM-[facility] Inpatient Daily Activity Raw Score: 15  AM-PAC Inpatient ADL T-Scale Score : 34.69    Following session, patient left in safe position with all fall

## 2024-12-20 NOTE — ED NOTES
ED to inpatient nurses report      Chief Complaint:  Chief Complaint   Patient presents with    Fatigue    Altered Mental Status     Present to ED from: home    MOA:     LOC: alert and orientated to name and place  Mobility: Requires assistance * 2  Oxygen Baseline: RA    Current needs required: RA     Code Status:   No Order    What abnormal results were found and what did you give/do to treat them? fluids  Any procedures or intervention occur?     Mental Status:  Level of Consciousness: Alert (0)    Psych Assessment:        Vitals:  Patient Vitals for the past 24 hrs:   BP Temp Temp src Pulse Resp SpO2 Weight   12/19/24 2312 119/65 -- -- 80 18 -- --   12/19/24 2311 -- -- -- 93 20 90 % --   12/19/24 2300 101/62 -- -- 81 20 96 % --   12/19/24 2224 100/67 -- -- 85 20 97 % --   12/19/24 2127 91/61 -- -- 85 22 96 % --   12/19/24 2027 129/81 -- -- 96 14 95 % --   12/19/24 1916 109/78 -- -- (!) 103 27 95 % --   12/19/24 1851 (!) 148/90 98.5 °F (36.9 °C) Oral (!) 112 18 96 % 78.5 kg (173 lb)        LDAs:   Peripheral IV 12/19/24 Distal;Left Forearm (Active)   Site Assessment Clean, dry & intact 12/19/24 2319   Line Status Normal saline locked 12/19/24 2319   Phlebitis Assessment No symptoms 12/19/24 2319   Infiltration Assessment 0 12/19/24 2319   Alcohol Cap Used Yes 12/19/24 2319   Dressing Status Clean, dry & intact 12/19/24 2319   Dressing Type Transparent 12/19/24 2319   Dressing Intervention New 12/19/24 1859       Ambulatory Status:  No data recorded    Diagnosis:  DISPOSITION Admitted 12/19/2024 10:38:44 PM   Final diagnoses:   COVID-19 virus detected   Disorientation   Fall from standing, initial encounter        Consults:  None     Pain Score:  Pain Assessment  Pain Assessment: 0-10 (arthritis pain)    C-SSRS:        Sepsis Screening:       [place] Fall Risk:       Swallow Screening        Preferred Language:   English      ALLERGIES     Patient has no known allergies.    SURGICAL HISTORY       Past Surgical  History:   Procedure Laterality Date    CARPAL TUNNEL RELEASE  1995 and 1995    left and  right     COLONOSCOPY  2009    dr. decker and due 2014    CORONARY ARTERY BYPASS GRAFT  2005    HERNIA REPAIR      LUMBAR SPINE SURGERY  11/2019      L4  and  L5   k[facility]   at   [facility]     TOTAL KNEE ARTHROPLASTY Left 06/03/2019    Dr Marlow @ [facility]        PAST MEDICAL HISTORY       Past Medical History:   Diagnosis Date    CAD (coronary artery disease)     Colon polyp     Hyperlipidemia     Hypertension     S/P CABG (coronary artery bypass graft)     Type II or unspecified type diabetes mellitus without mention of complication, not stated as uncontrolled            Electronically signed by Luis Daniel Diane RN on 12/19/2024 at 11:23 PM

## 2024-12-20 NOTE — ED NOTES
This RN in to round. RR regular and unlabored. C-collar removed per Dr. Enamorado. Pt remains alert. Orientation unchanged from previous assessment.

## 2024-12-20 NOTE — ED NOTES
Spoke to  staff who approved patient transfer to [facility]. Patient transported upstairs in stable condition.

## 2024-12-20 NOTE — ED TRIAGE NOTES
Pt presents to ED via EMS subsequent to fall. Found by son trying to get off the floor. Dr. Enamorado present for triage.

## 2024-12-20 NOTE — H&P
Hospitalist History & Physical         Patient: Ravi Khan 89 y.o. male      : 1935  Date of Admission: 2024  Date of Service: Pt seen/examined on 24 and Admitted to Inpatient with expected LOS greater than two midnights due to medical therapy.         ASSESSMENT AND PLAN    COVID: Patient does not qualify for any therapies at this time. Positive COVID test on arrival.   Supportive care.  CRP pending    AMS: likely 2/2 COVID infection  Neuro checks    Fall, unwitnessed:   Fall precautions  Consider sitter pending patient's mental status and if impulsive.  PT/OT when appropriate    Elevated troponin in setting of history of CAD: troponin 37 in ED. No active chest pain, shortness of breath, dizziness or lightheadedness.   Delta pending  Resume home medications as able    Mild Rhabdo: CK 1758. Already received 1 L NS. Will add 1 more L of NS, which should be more than plenty of fluids and recheck CK level in am. If worsening consider further IVF pending PO status.     Borderline hypotension: noted Map 65 in ED. Will have ED provider give IVF. Notify provider for Map < 75 or SBP < 90. Avoid antihypertensives at this time.     NIDDMII: holding PO medications at this time. Start on low dose humalog with q6h accuchecks until able to eat/drink well then switch to ac/hs accuchecks, hypoglycemia protocol.     Elevated bilirubin: bilirubin 1.3, which appears chronic in nature and not outside of patient's past values.      Chronic Conditions (reviewed and stable unless otherwise stated)   CKD stage 3a: creatinine stable at 1.2  Strict I/O  Daily weights  Renally dose medications and avoid nephrotoxic agents  HLD: resume home statin      Data reviewed (unless otherwise discussed in assessment/plan)  EKG:  I have reviewed the EKG with the following interpretation: appears to be SR, but there is a lot of artifact, which makes rhythm difficult to read. Repeat EKG in am.   Imaging: I have reviewed CXR  Year: Never true     Ran Out of Food in the Last Year: Never true   Transportation Needs: Unknown (4/19/2024)    PRAPARE - Transportation     Lack of Transportation (Non-Medical): No   Physical Activity: Insufficiently Active (4/19/2024)    Exercise Vital Sign     Days of Exercise per Week: 2 days     Minutes of Exercise per Session: 10 min   Housing Stability: Unknown (4/19/2024)    Housing Stability Vital Sign     Unstable Housing in the Last Year: No        Code status: Full Code     Thank you George Conway MD for the opportunity to be involved in this patient's care.    Electronically signed by GUSTAVO Waldron CNP on 12/19/2024 at 9:24 PM.

## 2024-12-20 NOTE — PROGRESS NOTES
Patient arrived to 3B 26 from ED per cart at 2330. Patient transferred to bed with assist from nursing staff and tolerated well. Heart monitor applied and vitals taken. Full assessment performed. Admission completed and medications reviewed. Patient oriented to room and to call light. Explained to patient that [facility] is not responsible for any lost or stolen items; patient verbalized understanding. Questions answered. No complaints or concerns offered at this time. Bed alarm on. Will continue to monitor.

## 2024-12-20 NOTE — PROGRESS NOTES
Hospitalist Progress Note      Patient:  Ravi Khan 89 y.o. male     Unit/Bed:3B-26/026-A    Date of Admission: 12/19/2024      ASSESSMENT AND PLAN    Active Problems    Metabolic encephalopathy likely secondary COVID-19, but on room air.  History of fall with elevated CK levels was on IV fluids stopped because of elevated CK I have restarted that recheck in the morning..  Rhabdomyolysis as outlined above will need to continue to follow-up with CK is slowly coming down and IV fluids will be continued and will closely recheck renal function in a.m. , CK  down to 1122  History of non-insulin-dependent diabetes on p.o. meds currently on hold as patient's p.o. intake is variable.  Will restart based on blood sugar values  History of CABG no significant arrhythmias noted on the monitor  Hypotension will add Midrin 5 mg p.o. twice daily and encouraged p.o. intake of fluids.    Chronic Conditions (reviewed and stable unless otherwise stated)  History of CABG, type 2 diabetes , dyslipidemia, hypertension      LDA: []CVC / []PICC / []Midline / []Klein / []Drains / []Mediport / []None  Antibiotics:   Steroids:   Labs (still needed?): [x]Yes / []No  IVF (still needed?): [x]Yes / []No    Level of care: [x]Step Down / []Med-Surg  Bed Status: [x]Inpatient / []Observation  Telemetry: [x]Yes / []No  PT/OT: []Yes / []No    DVT Prophylaxis: [] Lovenox / [] Heparin / [] SCDs / [] Already on Systemic Anticoagulation / [] None   Code status: Full Code     Expected discharge date: TBD  Disposition: Based on clinical course    ===================================================================    Chief Complaint: Post fall    Subjective (past 24 hours):     Patient denies chest pain at this time no significant shortness of breath but does feel weak, and at bedside spoke with to is a witnessed fall no loss of consciousness.       HPI / Hospital Course:       Ravi Khan is a 89 y.o. male with PMHx of DMII, CAD, HLD, CKD, HTN

## 2024-12-20 NOTE — CARE COORDINATION
Case Management Assessment Initial Evaluation    Date/Time of Evaluation: 2024 8:22 AM  Assessment Completed by: Bela Castro RN    If patient is discharged prior to next notation, then this note serves as note for discharge by case management.    Patient Name: Ravi Khan                   YOB: 1935  Diagnosis: Disorientation [R41.0]  Fall from standing, initial encounter [W19.XXXA]  COVID-19 virus detected [U07.1]  COVID-19 [U07.1]                   Date / Time: 2024  6:46 PM  Location: [address] [place] [address]     Patient Admission Status: Inpatient   Readmission Risk Low 0-14, Mod 15-19), High > 20: Readmission Risk Score: 16.4    Current PCP: George Conway MD  Health Care Decision Makers:   Primary Decision Maker: Fernanda Khan - Spouse - [phone]    Secondary Decision Maker: Arias Khan - Child - [phone]    Additional Case Management Notes: To ED after found down on the ground by his son. + COVID on arrival. Hospitalist following. PT/OT. Telemetry. IVF@100. Vitamin C Asa. Lovenox. DM management. 94% on RA. Oriented to person only. Creat 1.3 Mg 1.6- IV replacement. Toital CK 1122.     Procedures: none    Imagin/19 CXR: Negative    XR Lumbar: Limited exam. No gross acute lumbar spine fracture identified however  evaluation is limited and fracture could potentially be missed. CT could be considered for further evaluation.  2. 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   XR Pelvis: Negative    CT Cspine: No acute cervical spine fracture identified. 2. Multilevel degenerative disease of the cervical spine   CT Head: No acute intracranial hemorrhage. 2. Cerebral volume loss. Cerebral white matter disease, likely ischemic  microvascular in nature      Patient Goals/Plan/Treatment Preferences: Met w/ Don. Verified insurance and PCP. He is typically independent and cares for his wife with dementia at home. His

## 2024-12-20 NOTE — PROCEDURES
PROCEDURE NOTE  Date: 12/20/2024   Name: Ravi Khan  YOB: 1935    Procedures  12 lead EKG completed. Results handed to Riddhi BUTLER.

## 2024-12-20 NOTE — CARE COORDINATION
DISCHARGE PLANNING EVALUATION  12/20/24, 12:39 PM EST    Reason for Referral: Therapy recommending ECF for rehab.  Decision Maker: Patient is able to make his own decisions.    Current Services: None  New Services Requested: Considering ECF for rehab at discharge.   Family/ Social/ Home environment: From home with his spouse who has dementia.  He has two sone Arias and Noam. Both of them and their families are supportive.  They take the patient and his spouse to all of their appointments.    Payment Source:Medicare and [employer].   Transportation at Discharge: Family will provide transport at discharge.    Post-acute (PAC) provider list was provided to patient. Patient was informed of their freedom to choose PAC provider. Discussed and offered to show the patient the relevant PAC Providers quality and resource use measures on Medicare Compare web site via computer based on patient's goals of care and treatment preferences. Questions regarding selection process were answered.      Teach Back Method used with Ravi regarding care plan and discharge planning.   Patient verbalized understanding of the plan of care and contribute to goal setting.       Patient preferences and discharge plan: Ravi is considering ECF at discharge.  He was worried about someone taking care of his spouse.  SW called son Arias and they will make sure she has someone with her all of the time if he needs to go to a facility.  [facility] also agreeable to ECF.  SW make Ravi aware that family was with his spouse and were planning on staying with her.  Gave him an ECF list and asked that they come up with ECF options over the weekend.  [facility] and patient were both agreeable.      Electronically signed by JYOTI Gurrola on 12/20/2024 at 12:39 PM

## 2024-12-20 NOTE — PLAN OF CARE
Problem: Pain  Goal: Verbalizes/displays adequate comfort level or baseline comfort level  Outcome: Progressing  Flowsheets (Taken 12/20/2024 0053)  Verbalizes/displays adequate comfort level or baseline comfort level:   Encourage patient to monitor pain and request assistance   Administer analgesics based on type and severity of pain and evaluate response   Assess pain using appropriate pain scale   Implement non-pharmacological measures as appropriate and evaluate response  Note: Ongoing assessment & interventions provided throughout shift.  Reminded patient to report any pain, pressure, or shortness of breath to the nurse.  Pain medications provided per physician's orders.       Problem: Chronic Conditions and Co-morbidities  Goal: Patient's chronic conditions and co-morbidity symptoms are monitored and maintained or improved  Outcome: Progressing  Flowsheets (Taken 12/20/2024 0053)  Care Plan - Patient's Chronic Conditions and Co-Morbidity Symptoms are Monitored and Maintained or Improved:   Monitor and assess patient's chronic conditions and comorbid symptoms for stability, deterioration, or improvement   Collaborate with multidisciplinary team to address chronic and comorbid conditions and prevent exacerbation or deterioration   Update acute care plan with appropriate goals if chronic or comorbid symptoms are exacerbated and prevent overall improvement and discharge     Problem: Discharge Planning  Goal: Discharge to home or other facility with appropriate resources  Outcome: Progressing  Flowsheets (Taken 12/20/2024 0053)  Discharge to home or other facility with appropriate resources:   Identify barriers to discharge with patient and caregiver   Identify discharge learning needs (meds, wound care, etc)   Arrange for needed discharge resources and transportation as appropriate     Problem: Safety - Adult  Goal: Free from fall injury  Outcome: Progressing  Flowsheets (Taken 12/20/2024 0053)  Free From Fall

## 2024-12-21 LAB
ANION GAP SERPL CALC-SCNC: 10 MEQ/L (ref 8–16)
BASOPHILS ABSOLUTE: 0 THOU/MM3 (ref 0–0.1)
BASOPHILS NFR BLD AUTO: 0.3 %
BUN SERPL-MCNC: 50 MG/DL (ref 7–22)
CALCIUM SERPL-MCNC: 8.1 MG/DL (ref 8.5–10.5)
CHLORIDE SERPL-SCNC: 109 MEQ/L (ref 98–111)
CK SERPL-CCNC: 487 U/L (ref 55–170)
CO2 SERPL-SCNC: 21 MEQ/L (ref 23–33)
CREAT SERPL-MCNC: 1.3 MG/DL (ref 0.4–1.2)
DEPRECATED RDW RBC AUTO: 44.4 FL (ref 35–45)
EKG ATRIAL RATE: 394 BPM
EKG Q-T INTERVAL: 336 MS
EKG QRS DURATION: 90 MS
EKG QTC CALCULATION (BAZETT): 463 MS
EKG R AXIS: 5 DEGREES
EKG T AXIS: 29 DEGREES
EKG VENTRICULAR RATE: 114 BPM
EOSINOPHIL NFR BLD AUTO: 1.4 %
EOSINOPHILS ABSOLUTE: 0.1 THOU/MM3 (ref 0–0.4)
ERYTHROCYTE [DISTWIDTH] IN BLOOD BY AUTOMATED COUNT: 12.6 % (ref 11.5–14.5)
GFR SERPL CREATININE-BSD FRML MDRD: 53 ML/MIN/1.73M2
GLUCOSE BLD STRIP.AUTO-MCNC: 114 MG/DL (ref 70–108)
GLUCOSE BLD STRIP.AUTO-MCNC: 115 MG/DL (ref 70–108)
GLUCOSE BLD STRIP.AUTO-MCNC: 135 MG/DL (ref 70–108)
GLUCOSE BLD STRIP.AUTO-MCNC: 138 MG/DL (ref 70–108)
GLUCOSE BLD STRIP.AUTO-MCNC: 175 MG/DL (ref 70–108)
GLUCOSE BLD STRIP.AUTO-MCNC: 176 MG/DL (ref 70–108)
GLUCOSE SERPL-MCNC: 94 MG/DL (ref 70–108)
HCT VFR BLD AUTO: 33.9 % (ref 42–52)
HGB BLD-MCNC: 10.5 GM/DL (ref 14–18)
IMM GRANULOCYTES # BLD AUTO: 0.02 THOU/MM3 (ref 0–0.07)
IMM GRANULOCYTES NFR BLD AUTO: 0.3 %
LYMPHOCYTES ABSOLUTE: 1.4 THOU/MM3 (ref 1–4.8)
LYMPHOCYTES NFR BLD AUTO: 21.3 %
MAGNESIUM SERPL-MCNC: 2 MG/DL (ref 1.6–2.4)
MCH RBC QN AUTO: 29.7 PG (ref 26–33)
MCHC RBC AUTO-ENTMCNC: 31 GM/DL (ref 32.2–35.5)
MCV RBC AUTO: 96 FL (ref 80–94)
MONOCYTES ABSOLUTE: 0.7 THOU/MM3 (ref 0.4–1.3)
MONOCYTES NFR BLD AUTO: 11 %
NEUTROPHILS ABSOLUTE: 4.2 THOU/MM3 (ref 1.8–7.7)
NEUTROPHILS NFR BLD AUTO: 65.7 %
NRBC BLD AUTO-RTO: 0 /100 WBC
PLATELET # BLD AUTO: 116 THOU/MM3 (ref 130–400)
PMV BLD AUTO: 9.2 FL (ref 9.4–12.4)
POTASSIUM SERPL-SCNC: 3.5 MEQ/L (ref 3.5–5.2)
RBC # BLD AUTO: 3.53 MILL/MM3 (ref 4.7–6.1)
SODIUM SERPL-SCNC: 140 MEQ/L (ref 135–145)
WBC # BLD AUTO: 6.4 THOU/MM3 (ref 4.8–10.8)

## 2024-12-21 PROCEDURE — 6370000000 HC RX 637 (ALT 250 FOR IP): Performed by: INTERNAL MEDICINE

## 2024-12-21 PROCEDURE — 85025 COMPLETE CBC W/AUTO DIFF WBC: CPT

## 2024-12-21 PROCEDURE — 83735 ASSAY OF MAGNESIUM: CPT

## 2024-12-21 PROCEDURE — 93010 ELECTROCARDIOGRAM REPORT: CPT | Performed by: INTERNAL MEDICINE

## 2024-12-21 PROCEDURE — 82550 ASSAY OF CK (CPK): CPT

## 2024-12-21 PROCEDURE — 6360000002 HC RX W HCPCS

## 2024-12-21 PROCEDURE — 36415 COLL VENOUS BLD VENIPUNCTURE: CPT

## 2024-12-21 PROCEDURE — 80048 BASIC METABOLIC PNL TOTAL CA: CPT

## 2024-12-21 PROCEDURE — 99232 SBSQ HOSP IP/OBS MODERATE 35: CPT | Performed by: INTERNAL MEDICINE

## 2024-12-21 PROCEDURE — 2580000003 HC RX 258: Performed by: INTERNAL MEDICINE

## 2024-12-21 PROCEDURE — 51798 US URINE CAPACITY MEASURE: CPT

## 2024-12-21 PROCEDURE — 51702 INSERT TEMP BLADDER CATH: CPT

## 2024-12-21 PROCEDURE — 6370000000 HC RX 637 (ALT 250 FOR IP)

## 2024-12-21 PROCEDURE — 2140000000 HC CCU INTERMEDIATE R&B

## 2024-12-21 PROCEDURE — 82948 REAGENT STRIP/BLOOD GLUCOSE: CPT

## 2024-12-21 PROCEDURE — 51701 INSERT BLADDER CATHETER: CPT

## 2024-12-21 RX ORDER — METOPROLOL TARTRATE 25 MG/1
12.5 TABLET, FILM COATED ORAL 2 TIMES DAILY
Status: DISCONTINUED | OUTPATIENT
Start: 2024-12-21 | End: 2024-12-24

## 2024-12-21 RX ADMIN — METOPROLOL TARTRATE 12.5 MG: 25 TABLET, FILM COATED ORAL at 20:46

## 2024-12-21 RX ADMIN — ENOXAPARIN SODIUM 40 MG: 100 INJECTION SUBCUTANEOUS at 09:03

## 2024-12-21 RX ADMIN — SODIUM CHLORIDE: 9 INJECTION, SOLUTION INTRAVENOUS at 20:41

## 2024-12-21 RX ADMIN — SODIUM CHLORIDE: 9 INJECTION, SOLUTION INTRAVENOUS at 00:02

## 2024-12-21 RX ADMIN — TAMSULOSIN HYDROCHLORIDE 0.4 MG: 0.4 CAPSULE ORAL at 09:02

## 2024-12-21 RX ADMIN — OXYCODONE HYDROCHLORIDE AND ACETAMINOPHEN 1000 MG: 500 TABLET ORAL at 09:03

## 2024-12-21 RX ADMIN — ASPIRIN 81 MG: 81 TABLET, COATED ORAL at 09:03

## 2024-12-21 RX ADMIN — LEVOTHYROXINE SODIUM 25 MCG: 0.03 TABLET ORAL at 05:30

## 2024-12-21 NOTE — PLAN OF CARE
Problem: Pain  Goal: Verbalizes/displays adequate comfort level or baseline comfort level  Outcome: Progressing  Flowsheets (Taken 12/20/2024 2331)  Verbalizes/displays adequate comfort level or baseline comfort level: Encourage patient to monitor pain and request assistance     Problem: Chronic Conditions and Co-morbidities  Goal: Patient's chronic conditions and co-morbidity symptoms are monitored and maintained or improved  Outcome: Progressing  Flowsheets (Taken 12/20/2024 2331)  Care Plan - Patient's Chronic Conditions and Co-Morbidity Symptoms are Monitored and Maintained or Improved: Monitor and assess patient's chronic conditions and comorbid symptoms for stability, deterioration, or improvement     Problem: Discharge Planning  Goal: Discharge to home or other facility with appropriate resources  12/20/2024 2331 by Benoit Quezada RN  Outcome: Progressing  Flowsheets (Taken 12/20/2024 2331)  Discharge to home or other facility with appropriate resources:   Identify barriers to discharge with patient and caregiver   Arrange for needed discharge resources and transportation as appropriate   Identify discharge learning needs (meds, wound care, etc)   Refer to discharge planning if patient needs post-hospital services based on physician order or complex needs related to functional status, cognitive ability or social support system     Problem: Safety - Adult  Goal: Free from fall injury  Outcome: Progressing  Flowsheets (Taken 12/20/2024 2331)  Free From Fall Injury: Instruct family/caregiver on patient safety     Problem: ABCDS Injury Assessment  Goal: Absence of physical injury  Outcome: Progressing  Flowsheets (Taken 12/20/2024 2331)  Absence of Physical Injury: Implement safety measures based on patient assessment     Problem: Skin/Tissue Integrity  Goal: Absence of new skin breakdown  Description: 1.  Monitor for areas of redness and/or skin breakdown  2.  Assess vascular access sites hourly  3.  Every

## 2024-12-21 NOTE — PROGRESS NOTES
Hospitalist Progress Note    Patient:  Ravi Khan      Unit/Bed:3B-26/026-A    YOB: 1935    MRN: [id number]       Acct: [id number]     PCP: George Conway MD    Date of Admission: 12/19/2024    Active Hospital Problems    Diagnosis Date Noted    COVID-19 [U07.1] 12/19/2024     Assessment/Plan:    # Acute Metabolic Encephalopathy 2/2 COVID-19: Patient does not qualify for any therapies at this time. Positive COVID test on arrival. HD stable. On RA. Mental status back to baseline.     # Fall, Unwitnessed: PT/OT. Family requesting SNF placement. Consul SW.     # Mild Rhabdo: CK 1758 on arrival. Likely from fall. Cont IVF hydration. Down trending appropriately. Down to 487 today     # Borderline hypotension: noted Map 65 in ED. NS fluid bolus given, started on Midodrine 5 mg BID.     # Elevated troponin in setting of history of CAD and CKD: troponin 37 in ED, 43 on repeat. No active chest pain, shortness of breath, dizziness or lightheadedness. Cont to trend     # NIDDMII: holding PO medications at this time. Start on low dose humalog with q6h accuchecks until able to eat/drink well then switch to ac/hs accuchecks, hypoglycemia protocol.     Chronic Conditions (reviewed and stable unless otherwise stated)   CKD stage 3a: creatinine stable at 1.2  Strict I/O  Daily weights  Renally dose medications and avoid nephrotoxic agents  HLD: resume home statin    Dispo: pending SNF. PT/OT and SW.       Chief Complaint: fall     Hospital Course: Ravi Khan is a 89 y.o. male with PMHx of DMII, CAD, HLD, CKD, HTN who presents to [facility] with fall.  It is reported that the patient was found down on the ground by his son today after having an unwitnessed fall. Patient is non-contributory to HPI due to AMS. Son states that patient is normally independent for ADLs, but now patient is somnolent and not able to do any of the things he normally does. He also states that the patient has

## 2024-12-21 NOTE — PLAN OF CARE
Problem: Pain  Goal: Verbalizes/displays adequate comfort level or baseline comfort level  Outcome: Progressing  Flowsheets (Taken 12/21/2024 0847)  Verbalizes/displays adequate comfort level or baseline comfort level: Encourage patient to monitor pain and request assistance     Problem: Chronic Conditions and Co-morbidities  Goal: Patient's chronic conditions and co-morbidity symptoms are monitored and maintained or improved  Outcome: Progressing  Flowsheets (Taken 12/21/2024 0847)  Care Plan - Patient's Chronic Conditions and Co-Morbidity Symptoms are Monitored and Maintained or Improved: Monitor and assess patient's chronic conditions and comorbid symptoms for stability, deterioration, or improvement     Problem: Discharge Planning  Goal: Discharge to home or other facility with appropriate resources  Outcome: Progressing  Flowsheets (Taken 12/21/2024 0847)  Discharge to home or other facility with appropriate resources: Identify barriers to discharge with patient and caregiver   Continue discharge planning, possible SNF at discharge.     Problem: Safety - Adult  Goal: Free from fall injury  Outcome: Progressing   Continue fall precautions, up with 2 assist and walker.     Problem: ABCDS Injury Assessment  Goal: Absence of physical injury  Outcome: Progressing     Problem: Skin/Tissue Integrity  Goal: Absence of new skin breakdown  Description: 1.  Monitor for areas of redness and/or skin breakdown  2.  Assess vascular access sites hourly  3.  Every 4-6 hours minimum:  Change oxygen saturation probe site  4.  Every 4-6 hours:  If on nasal continuous positive airway pressure, respiratory therapy assess nares and determine need for appliance change or resting period.  Outcome: Progressing     Problem: Respiratory - Adult  Goal: Achieves optimal ventilation and oxygenation  Outcome: Progressing   Patient remains on room air.     Problem: Cardiovascular - Adult  Goal: Maintains optimal cardiac output and  hemodynamic stability  Outcome: Progressing   HR sinus rhythm per telemetry, VS stable.     Problem: Cardiovascular - Adult  Goal: Absence of cardiac dysrhythmias or at baseline  Outcome: Progressing     Problem: Skin/Tissue Integrity - Adult  Goal: Skin integrity remains intact  Outcome: Progressing  Flowsheets  Taken 12/21/2024 0957  Skin Integrity Remains Intact: Monitor for areas of redness and/or skin breakdown  Taken 12/21/2024 0847  Skin Integrity Remains Intact: Monitor for areas of redness and/or skin breakdown     Problem: Skin/Tissue Integrity - Adult  Goal: Incisions, wounds, or drain sites healing without S/S of infection  Outcome: Progressing   Redness to coccyx, buttocks, continue to turn and reposition. Waffle cushion to chair.    Problem: Musculoskeletal - Adult  Goal: Return mobility to safest level of function  Outcome: Progressing   2 person assist to chair, gait unsteady.     Problem: Musculoskeletal - Adult  Goal: Return ADL status to a safe level of function  Outcome: Progressing     Problem: Genitourinary - Adult  Goal: Absence of urinary retention  Outcome: Progressing   Monitor for urine retention, bladder scan and straight cath as needed.     Problem: Infection - Adult  Goal: Absence of infection at discharge  Outcome: Progressing   Afebrile this shift. Continue droplet isolation for Covid.     Problem: Infection - Adult  Goal: Absence of infection during hospitalization  Outcome: Progressing     Care plan reviewed with patient.  Patient verbalizes understanding of the care plan and contributed to goal setting.

## 2024-12-22 LAB
ANION GAP SERPL CALC-SCNC: 11 MEQ/L (ref 8–16)
BASOPHILS ABSOLUTE: 0 THOU/MM3 (ref 0–0.1)
BASOPHILS NFR BLD AUTO: 0.4 %
BUN SERPL-MCNC: 33 MG/DL (ref 7–22)
CALCIUM SERPL-MCNC: 8 MG/DL (ref 8.5–10.5)
CHLORIDE SERPL-SCNC: 110 MEQ/L (ref 98–111)
CK SERPL-CCNC: 237 U/L (ref 55–170)
CO2 SERPL-SCNC: 20 MEQ/L (ref 23–33)
CREAT SERPL-MCNC: 1.1 MG/DL (ref 0.4–1.2)
DEPRECATED RDW RBC AUTO: 42.8 FL (ref 35–45)
EOSINOPHIL NFR BLD AUTO: 2.6 %
EOSINOPHILS ABSOLUTE: 0.1 THOU/MM3 (ref 0–0.4)
ERYTHROCYTE [DISTWIDTH] IN BLOOD BY AUTOMATED COUNT: 12.1 % (ref 11.5–14.5)
GFR SERPL CREATININE-BSD FRML MDRD: 64 ML/MIN/1.73M2
GLUCOSE BLD STRIP.AUTO-MCNC: 129 MG/DL (ref 70–108)
GLUCOSE BLD STRIP.AUTO-MCNC: 142 MG/DL (ref 70–108)
GLUCOSE BLD STRIP.AUTO-MCNC: 188 MG/DL (ref 70–108)
GLUCOSE SERPL-MCNC: 129 MG/DL (ref 70–108)
HCT VFR BLD AUTO: 34.5 % (ref 42–52)
HGB BLD-MCNC: 11.2 GM/DL (ref 14–18)
IMM GRANULOCYTES # BLD AUTO: 0.01 THOU/MM3 (ref 0–0.07)
IMM GRANULOCYTES NFR BLD AUTO: 0.2 %
LYMPHOCYTES ABSOLUTE: 1.1 THOU/MM3 (ref 1–4.8)
LYMPHOCYTES NFR BLD AUTO: 19.1 %
MAGNESIUM SERPL-MCNC: 1.9 MG/DL (ref 1.6–2.4)
MCH RBC QN AUTO: 30.7 PG (ref 26–33)
MCHC RBC AUTO-ENTMCNC: 32.5 GM/DL (ref 32.2–35.5)
MCV RBC AUTO: 94.5 FL (ref 80–94)
MONOCYTES ABSOLUTE: 0.6 THOU/MM3 (ref 0.4–1.3)
MONOCYTES NFR BLD AUTO: 11 %
NEUTROPHILS ABSOLUTE: 3.7 THOU/MM3 (ref 1.8–7.7)
NEUTROPHILS NFR BLD AUTO: 66.7 %
NRBC BLD AUTO-RTO: 0 /100 WBC
PLATELET # BLD AUTO: 118 THOU/MM3 (ref 130–400)
PMV BLD AUTO: 9.8 FL (ref 9.4–12.4)
POTASSIUM SERPL-SCNC: 4.5 MEQ/L (ref 3.5–5.2)
RBC # BLD AUTO: 3.65 MILL/MM3 (ref 4.7–6.1)
SODIUM SERPL-SCNC: 141 MEQ/L (ref 135–145)
WBC # BLD AUTO: 5.5 THOU/MM3 (ref 4.8–10.8)

## 2024-12-22 PROCEDURE — 6370000000 HC RX 637 (ALT 250 FOR IP): Performed by: INTERNAL MEDICINE

## 2024-12-22 PROCEDURE — 2500000003 HC RX 250 WO HCPCS

## 2024-12-22 PROCEDURE — 82550 ASSAY OF CK (CPK): CPT

## 2024-12-22 PROCEDURE — 80048 BASIC METABOLIC PNL TOTAL CA: CPT

## 2024-12-22 PROCEDURE — 6370000000 HC RX 637 (ALT 250 FOR IP)

## 2024-12-22 PROCEDURE — 82948 REAGENT STRIP/BLOOD GLUCOSE: CPT

## 2024-12-22 PROCEDURE — 2140000000 HC CCU INTERMEDIATE R&B

## 2024-12-22 PROCEDURE — 85025 COMPLETE CBC W/AUTO DIFF WBC: CPT

## 2024-12-22 PROCEDURE — 99232 SBSQ HOSP IP/OBS MODERATE 35: CPT | Performed by: INTERNAL MEDICINE

## 2024-12-22 PROCEDURE — 83735 ASSAY OF MAGNESIUM: CPT

## 2024-12-22 PROCEDURE — 6360000002 HC RX W HCPCS

## 2024-12-22 PROCEDURE — 51702 INSERT TEMP BLADDER CATH: CPT

## 2024-12-22 PROCEDURE — 6360000002 HC RX W HCPCS: Performed by: INTERNAL MEDICINE

## 2024-12-22 PROCEDURE — 36415 COLL VENOUS BLD VENIPUNCTURE: CPT

## 2024-12-22 RX ORDER — HYDRALAZINE HYDROCHLORIDE 20 MG/ML
10 INJECTION INTRAMUSCULAR; INTRAVENOUS EVERY 6 HOURS PRN
Status: DISCONTINUED | OUTPATIENT
Start: 2024-12-22 | End: 2024-12-24

## 2024-12-22 RX ADMIN — ENOXAPARIN SODIUM 40 MG: 100 INJECTION SUBCUTANEOUS at 08:07

## 2024-12-22 RX ADMIN — OXYCODONE HYDROCHLORIDE AND ACETAMINOPHEN 1000 MG: 500 TABLET ORAL at 08:08

## 2024-12-22 RX ADMIN — METOPROLOL TARTRATE 12.5 MG: 25 TABLET, FILM COATED ORAL at 21:24

## 2024-12-22 RX ADMIN — INSULIN LISPRO 1 UNITS: 100 INJECTION, SOLUTION INTRAVENOUS; SUBCUTANEOUS at 12:15

## 2024-12-22 RX ADMIN — ASPIRIN 81 MG: 81 TABLET, COATED ORAL at 08:08

## 2024-12-22 RX ADMIN — SODIUM CHLORIDE, PRESERVATIVE FREE 10 ML: 5 INJECTION INTRAVENOUS at 21:24

## 2024-12-22 RX ADMIN — METOPROLOL TARTRATE 12.5 MG: 25 TABLET, FILM COATED ORAL at 08:08

## 2024-12-22 RX ADMIN — HYDRALAZINE HYDROCHLORIDE 10 MG: 20 INJECTION INTRAMUSCULAR; INTRAVENOUS at 15:56

## 2024-12-22 RX ADMIN — TAMSULOSIN HYDROCHLORIDE 0.4 MG: 0.4 CAPSULE ORAL at 08:08

## 2024-12-22 RX ADMIN — LEVOTHYROXINE SODIUM 25 MCG: 0.03 TABLET ORAL at 06:21

## 2024-12-22 RX ADMIN — ACETAMINOPHEN 650 MG: 325 TABLET ORAL at 16:22

## 2024-12-22 RX ADMIN — SODIUM CHLORIDE, PRESERVATIVE FREE 10 ML: 5 INJECTION INTRAVENOUS at 08:08

## 2024-12-22 ASSESSMENT — PAIN SCALES - GENERAL
PAINLEVEL_OUTOF10: 0
PAINLEVEL_OUTOF10: 3
PAINLEVEL_OUTOF10: 2
PAINLEVEL_OUTOF10: 2
PAINLEVEL_OUTOF10: 0

## 2024-12-22 ASSESSMENT — PAIN DESCRIPTION - LOCATION: LOCATION: BACK

## 2024-12-22 NOTE — PLAN OF CARE
Injury: Instruct family/caregiver on patient safety     Problem: ABCDS Injury Assessment  Goal: Absence of physical injury  12/21/2024 2305 by Benoit Quezada RN  Outcome: Progressing  Flowsheets (Taken 12/21/2024 2305)  Absence of Physical Injury: Implement safety measures based on patient assessment     Problem: Skin/Tissue Integrity  Goal: Absence of new skin breakdown  Description: 1.  Monitor for areas of redness and/or skin breakdown  2.  Assess vascular access sites hourly  3.  Every 4-6 hours minimum:  Change oxygen saturation probe site  4.  Every 4-6 hours:  If on nasal continuous positive airway pressure, respiratory therapy assess nares and determine need for appliance change or resting period.  12/21/2024 2305 by Benoit Quezada RN  Outcome: Progressing     Problem: Respiratory - Adult  Goal: Achieves optimal ventilation and oxygenation  12/21/2024 2305 by Benoit Quezada RN  Outcome: Progressing  Flowsheets (Taken 12/21/2024 2305)  Achieves optimal ventilation and oxygenation: Assess for changes in respiratory status     Problem: Cardiovascular - Adult  Goal: Maintains optimal cardiac output and hemodynamic stability  12/21/2024 2305 by Benoit Quezada RN  Outcome: Progressing  Flowsheets (Taken 12/21/2024 2305)  Maintains optimal cardiac output and hemodynamic stability:   Monitor blood pressure and heart rate   Assess for signs of decreased cardiac output     Problem: Cardiovascular - Adult  Goal: Absence of cardiac dysrhythmias or at baseline  12/21/2024 2305 by Benoit Quezada RN  Outcome: Progressing  Flowsheets (Taken 12/21/2024 2305)  Absence of cardiac dysrhythmias or at baseline:   Monitor cardiac rate and rhythm   Assess for signs of decreased cardiac output     Problem: Skin/Tissue Integrity - Adult  Goal: Skin integrity remains intact  12/21/2024 2305 by Benoit Quezada RN  Outcome: Progressing  Flowsheets (Taken 12/21/2024 2305)  Skin Integrity Remains Intact: Monitor for  areas of redness and/or skin breakdown     Problem: Skin/Tissue Integrity - Adult  Goal: Incisions, wounds, or drain sites healing without S/S of infection  12/21/2024 2305 by Benoit Quezada RN  Outcome: Progressing     Problem: Musculoskeletal - Adult  Goal: Return mobility to safest level of function  12/21/2024 2305 by Benoit Quezada RN  Outcome: Progressing     Problem: Musculoskeletal - Adult  Goal: Return ADL status to a safe level of function  12/21/2024 2305 by Benoit Quezada RN  Outcome: Progressing     Problem: Genitourinary - Adult  Goal: Absence of urinary retention  12/21/2024 2305 by Benoit Quezada RN  Outcome: Progressing  Flowsheets (Taken 12/21/2024 2305)  Absence of urinary retention:   Assess patient’s ability to void and empty bladder   Monitor intake/output and perform bladder scan as needed   Place urinary catheter per Licensed Independent Practitioner order if needed     Problem: Infection - Adult  Goal: Absence of infection at discharge  12/21/2024 2305 by Benoit Quezada RN  Outcome: Progressing     Problem: Infection - Adult  Goal: Absence of infection during hospitalization  12/21/2024 2305 by Benoit Quezada RN  Outcome: Progressing  Flowsheets (Taken 12/21/2024 2305)  Absence of infection during hospitalization:   Assess and monitor for signs and symptoms of infection   Monitor all insertion sites i.e., indwelling lines, tubes and drains   Monitor lab/diagnostic results

## 2024-12-22 NOTE — PROGRESS NOTES
Hospitalist Progress Note    Patient:  Ravi Khan      Unit/Bed:3B-26/026-A    YOB: 1935    MRN: [id number]       Acct: [id number]     PCP: George Conway MD    Date of Admission: 12/19/2024    Active Hospital Problems    Diagnosis Date Noted    COVID-19 [U07.1] 12/19/2024     Assessment/Plan:    # Acute Metabolic Encephalopathy 2/2 COVID-19: Patient does not qualify for any therapies at this time. Positive COVID test on arrival. HD stable. On RA. Mental status back to baseline.     # Fall, Unwitnessed: PT/OT. Family requesting SNF placement. Consul SW.     # Mild Rhabdo: CK 1758 on arrival. Likely from fall. Cont IVF hydration. Down trending appropriately. Down to 487 today     # Borderline hypotension, now Hypertension: noted Map 65 in ED. NS fluid bolus given, started on Midodrine 5 mg BID. Now having elevated BP, d/c Midodrine.     # Elevated troponin in setting of history of CAD and CKD: troponin 37 in ED, 43 on repeat. No active chest pain, shortness of breath, dizziness or lightheadedness. Cont to trend     # NIDDMII: holding PO medications at this time. Start on low dose humalog with q6h accuchecks until able to eat/drink well then switch to ac/hs accuchecks, hypoglycemia protocol.     Chronic Conditions (reviewed and stable unless otherwise stated)   CKD stage 3a: creatinine stable at 1.2  Strict I/O  Daily weights  Renally dose medications and avoid nephrotoxic agents  HLD: resume home statin    Dispo: pending SNF. PT/OT and SW.       Chief Complaint: fall     Hospital Course: Ravi Khan is a 89 y.o. male with PMHx of DMII, CAD, HLD, CKD, HTN who presents to [facility] with fall.  It is reported that the patient was found down on the ground by his son today after having an unwitnessed fall. Patient is non-contributory to HPI due to AMS. Son states that patient is normally independent for ADLs, but now patient is somnolent and not able to do any of the

## 2024-12-22 NOTE — PLAN OF CARE
Problem: Pain  Goal: Verbalizes/displays adequate comfort level or baseline comfort level  12/22/2024 0811 by Ricarda Lyons RN  Outcome: Progressing  Flowsheets (Taken 12/22/2024 0800)  Verbalizes/displays adequate comfort level or baseline comfort level: Encourage patient to monitor pain and request assistance  12/21/2024 2305 by Benoit Quezada RN  Outcome: Progressing  Flowsheets  Taken 12/21/2024 2305 by Benoit Quezada RN  Verbalizes/displays adequate comfort level or baseline comfort level: Encourage patient to monitor pain and request assistance  Taken 12/21/2024 1529 by Yahaira Gordillo RN  Verbalizes/displays adequate comfort level or baseline comfort level: Encourage patient to monitor pain and request assistance     Problem: Chronic Conditions and Co-morbidities  Goal: Patient's chronic conditions and co-morbidity symptoms are monitored and maintained or improved  12/22/2024 0811 by Ricarda Lyons RN  Outcome: Progressing  Flowsheets (Taken 12/22/2024 0804)  Care Plan - Patient's Chronic Conditions and Co-Morbidity Symptoms are Monitored and Maintained or Improved: Monitor and assess patient's chronic conditions and comorbid symptoms for stability, deterioration, or improvement  12/21/2024 2305 by Benoit Quezada RN  Outcome: Progressing  Flowsheets (Taken 12/21/2024 2305)  Care Plan - Patient's Chronic Conditions and Co-Morbidity Symptoms are Monitored and Maintained or Improved:   Monitor and assess patient's chronic conditions and comorbid symptoms for stability, deterioration, or improvement   Collaborate with multidisciplinary team to address chronic and comorbid conditions and prevent exacerbation or deterioration     Problem: Discharge Planning  Goal: Discharge to home or other facility with appropriate resources  12/22/2024 0811 by Ricarda Lyons RN  Outcome: Progressing  Flowsheets (Taken 12/22/2024 0804)  Discharge to home or other facility with appropriate resources:

## 2024-12-23 LAB
GLUCOSE BLD STRIP.AUTO-MCNC: 139 MG/DL (ref 70–108)
GLUCOSE BLD STRIP.AUTO-MCNC: 148 MG/DL (ref 70–108)
GLUCOSE BLD STRIP.AUTO-MCNC: 154 MG/DL (ref 70–108)
GLUCOSE BLD STRIP.AUTO-MCNC: 158 MG/DL (ref 70–108)
GLUCOSE BLD STRIP.AUTO-MCNC: 167 MG/DL (ref 70–108)

## 2024-12-23 PROCEDURE — 97530 THERAPEUTIC ACTIVITIES: CPT

## 2024-12-23 PROCEDURE — 2500000003 HC RX 250 WO HCPCS

## 2024-12-23 PROCEDURE — 97162 PT EVAL MOD COMPLEX 30 MIN: CPT

## 2024-12-23 PROCEDURE — 97535 SELF CARE MNGMENT TRAINING: CPT

## 2024-12-23 PROCEDURE — 82948 REAGENT STRIP/BLOOD GLUCOSE: CPT

## 2024-12-23 PROCEDURE — 6370000000 HC RX 637 (ALT 250 FOR IP)

## 2024-12-23 PROCEDURE — 6360000002 HC RX W HCPCS: Performed by: INTERNAL MEDICINE

## 2024-12-23 PROCEDURE — 2140000000 HC CCU INTERMEDIATE R&B

## 2024-12-23 PROCEDURE — 6360000002 HC RX W HCPCS

## 2024-12-23 PROCEDURE — 97110 THERAPEUTIC EXERCISES: CPT

## 2024-12-23 PROCEDURE — 99232 SBSQ HOSP IP/OBS MODERATE 35: CPT | Performed by: FAMILY MEDICINE

## 2024-12-23 RX ADMIN — ENOXAPARIN SODIUM 40 MG: 100 INJECTION SUBCUTANEOUS at 08:56

## 2024-12-23 RX ADMIN — HYDRALAZINE HYDROCHLORIDE 10 MG: 20 INJECTION INTRAMUSCULAR; INTRAVENOUS at 00:50

## 2024-12-23 RX ADMIN — SODIUM CHLORIDE, PRESERVATIVE FREE 10 ML: 5 INJECTION INTRAVENOUS at 19:47

## 2024-12-23 RX ADMIN — ASPIRIN 81 MG: 81 TABLET, COATED ORAL at 08:56

## 2024-12-23 RX ADMIN — TAMSULOSIN HYDROCHLORIDE 0.4 MG: 0.4 CAPSULE ORAL at 08:56

## 2024-12-23 RX ADMIN — LEVOTHYROXINE SODIUM 25 MCG: 0.03 TABLET ORAL at 05:48

## 2024-12-23 RX ADMIN — OXYCODONE HYDROCHLORIDE AND ACETAMINOPHEN 1000 MG: 500 TABLET ORAL at 08:56

## 2024-12-23 NOTE — CARE COORDINATION
12/23/24, 12:58 PM EST    DISCHARGE PLANNING EVALUATION    Spoke with patient this morning about discussing SNF with Haley on Friday, patient states that he does not remember that conversation. Called son Blake, he reports that they are thinking more about home health rather than SNF due to his mother having dementia and patient being at home with her all the time.     Nicholas Lozano asked that SW call nicholas Santacruz to discuss home health options.       2:31PM- SW did meet with nicholas Santacruz, alongside wife and patient in room. Provided Noam with home health list, they will review and let SW know their choices.      2:59 PM- SW received a call from patient's sons, they are agreeable to [facility] if necessary for home health. SW will make referral.

## 2024-12-23 NOTE — PLAN OF CARE
Problem: Pain  Goal: Verbalizes/displays adequate comfort level or baseline comfort level  Outcome: Progressing  Flowsheets (Taken 12/22/2024 0800 by Ricarda Lyons RN)  Verbalizes/displays adequate comfort level or baseline comfort level: Encourage patient to monitor pain and request assistance     Problem: Chronic Conditions and Co-morbidities  Goal: Patient's chronic conditions and co-morbidity symptoms are monitored and maintained or improved  Outcome: Progressing  Flowsheets (Taken 12/22/2024 0804 by Ricarda Lyons RN)  Care Plan - Patient's Chronic Conditions and Co-Morbidity Symptoms are Monitored and Maintained or Improved: Monitor and assess patient's chronic conditions and comorbid symptoms for stability, deterioration, or improvement     Problem: Discharge Planning  Goal: Discharge to home or other facility with appropriate resources  Outcome: Progressing  Flowsheets (Taken 12/22/2024 0804 by Ricarda Lyons RN)  Discharge to home or other facility with appropriate resources: Identify barriers to discharge with patient and caregiver     Problem: Safety - Adult  Goal: Free from fall injury  Outcome: Progressing  Flowsheets (Taken 12/21/2024 2305 by Benoit Quezada RN)  Free From Fall Injury: Instruct family/caregiver on patient safety     Problem: ABCDS Injury Assessment  Goal: Absence of physical injury  Outcome: Progressing  Flowsheets (Taken 12/21/2024 2305 by Benoit Quezada RN)  Absence of Physical Injury: Implement safety measures based on patient assessment     Problem: Skin/Tissue Integrity  Goal: Absence of new skin breakdown  Description: 1.  Monitor for areas of redness and/or skin breakdown  2.  Assess vascular access sites hourly  3.  Every 4-6 hours minimum:  Change oxygen saturation probe site  4.  Every 4-6 hours:  If on nasal continuous positive airway pressure, respiratory therapy assess nares and determine need for appliance change or resting period.  Outcome: Progressing      Problem: Respiratory - Adult  Goal: Achieves optimal ventilation and oxygenation  Outcome: Progressing  Flowsheets (Taken 12/22/2024 0804 by Ricarda Lyons RN)  Achieves optimal ventilation and oxygenation: Assess for changes in respiratory status     Problem: Cardiovascular - Adult  Goal: Maintains optimal cardiac output and hemodynamic stability  Outcome: Progressing  Goal: Absence of cardiac dysrhythmias or at baseline  Outcome: Progressing     Problem: Skin/Tissue Integrity - Adult  Goal: Skin integrity remains intact  Outcome: Progressing  Flowsheets (Taken 12/22/2024 0804 by Ricarda Lyons, RN)  Skin Integrity Remains Intact: Monitor for areas of redness and/or skin breakdown  Goal: Incisions, wounds, or drain sites healing without S/S of infection  Outcome: Progressing     Problem: Musculoskeletal - Adult  Goal: Return mobility to safest level of function  Outcome: Progressing  Goal: Return ADL status to a safe level of function  Outcome: Progressing     Problem: Genitourinary - Adult  Goal: Absence of urinary retention  Outcome: Progressing  Flowsheets (Taken 12/21/2024 2305 by Benoit Quezada RN)  Absence of urinary retention:   Assess patient’s ability to void and empty bladder   Monitor intake/output and perform bladder scan as needed   Place urinary catheter per Licensed Independent Practitioner order if needed     Problem: Infection - Adult  Goal: Absence of infection at discharge  Outcome: Progressing  Goal: Absence of infection during hospitalization  Outcome: Progressing      Routine observation

## 2024-12-23 NOTE — PROGRESS NOTES
[facility]  INPATIENT PHYSICAL THERAPY  EVALUATION  [facility] CCU-STEPDOWN 3B - 3B-26/026-A    Discharge Recommendations: Subacute/Skilled Nursing Facility  Equipment Recommendations: No             Time In: 08  Time Out: 08  Timed Code Treatment Minutes: 24 Minutes  Minutes: 32        Date: 2024  Patient Name: Ravi Khan,  Gender:  male        MRN: [id number]  : 1935  (89 y.o.)      Referring Practitioner: Walter Gudino APRN - CNP  Diagnosis: COVID-19  Additional Pertinent Hx: Ravi Khan is a 89 y.o. male with PMHx of DMII, CAD, HLD, CKD, HTN who presents to [facility] with fall. It is reported that the patient was found down on the ground by his son today after having an unwitnessed fall. Patient is non-contributory to HPI due to AMS. Son states that patient is normally independent for ADLs, but now patient is somnolent and not able to do any of the things he normally does. He also states that the patient has appeared to be ill with an upper respiratory infection lately, but is unsure as to how long symptoms have been present. Patient was complaining of back pain, but was unable to verbalize how bad pain was to this provider.     Restrictions/Precautions:  Restrictions/Precautions: General Precautions, Fall Risk, Contact Precautions (covid-19)     Required Braces or Orthoses?: No    Subjective:  Chart Reviewed: Yes  Patient assessed for rehabilitation services?: Yes  Subjective: Nurse approved session.  Patient was lying in bed and agreeable to therapy.    General:  Orientation Level: Oriented X4  Overall Cognitive Status: Exceptions  Vision: Impaired  Vision Exceptions: Wears glasses at all times  Hearing: Exceptions to WFL       Pain: --/10: When first questioned, patient replied, \"I don't know.\"  When performing LAQ, c/o pain in quads with (L) > (R) but did not rate on VAS.    Vitals: Blood Pressure: 147/67  Oxygen: 95%  Heart Rate: 68  understanding, Demonstrated understanding       Plan:  Current Treatment Recommendations: Strengthening, Balance training, Functional mobility training, Transfer training, Endurance training, Gait training, Stair training, Neuromuscular re-education, Therapeutic activities, Patient/Caregiver education & training, Safety education & training  General Plan:  (3-5x GM)    Goals:  Patient Goals : None stated  Short Term Goals  Time Frame for Short Term Goals: By discharge  Short Term Goal 1: Patient to demonstrate (I) bed mobility w/o rails.  Short Term Goal 2: Patient to transfer sit to stand with SBA.  Short Term Goal 3: Patient to ambulate 30 feet with 2WW with SBA.  Short Term Goal 4: Patient to ascend/descend 5 steps with (L) rail simulating home entry with CGA  Long Term Goals  Time Frame for Long Term Goals : N/A due to short ELOS    Following session, patient left in safe position with all fall risk precautions in place.

## 2024-12-23 NOTE — CARE COORDINATION
12/23/24, 2:08 PM EST    DISCHARGE ON GOING EVALUATION    Ravi P [facility] day: 4  Location: [facility]26/026-A Reason for admit: Disorientation [R41.0]  Fall from standing, initial encounter [W19.XXXA]  COVID-19 virus detected [U07.1]  COVID-19 [U07.1]     Procedures: none    Imaging since last note: none    Barriers to Discharge: Continues with PT/OT. Room air.     PCP: George Conway MD  Readmission Risk Score: 14    Patient Goals/Plan/Treatment Preferences: From home with wife who has dementia. He has a walker. SW followed up with patient and family today, now declines SNF. Considering HH.

## 2024-12-23 NOTE — PROGRESS NOTES
Hospitalist Progress Note      Patient:  Ravi Khan      Unit/Bed:3B-26/026-A    YOB: 1935    MRN: [id number]       Acct: [id number]     PCP: George Conway MD    Date of Admission: 12/19/2024    Date/Time of Evaluation:  12/23/2024 at 10:48 AM    Assessment/Plan:    Acute metabolic encephalopathy -- POA -- improved but pt very [place] so hard to tell if he is always hearing everything but seems stable 12/23 and appropriate -- CT head 12/19 (-) acute process -- u/a 12/20 (-) and only (+) COVID thus felt due to this per chart -- cont monitor   -- check B12, folate, TFT 12/24  COVID (+) -- POA -- CXR 12/19 (-) and pt w/o fever but weak and confused on admission and repeat CXR 12/20 with mild patchy airspace opacities in LLL - ?atelectasis vs pneumonia but likely atelectasis as no cough, afeb, -- on RA, no qualification for steroids or baricitinib.  On vit C  Mild Rhabdomyolysis, traumatic -- due to fall -- CK 1758 on admission and improving to 237 on 12/22 with IVF from 12/20 - 12/22 -- monitor prn  Labile Bps -- up to 180's occ and as low to 90's -- was initially started on midodrine 12/20 and never received and stopped 12/22 as BP's high -->  then home lopressor 12.5 mg bid started 12/21 and received 12/22 and BP 12/23 am in 90's and thus hold lopressor with fall on admission --> ??need echo or carotid to further eval vasculature -- ?flomax contrib.  Also has hydralazine prn -- Cont monitor and adjust prn  Urinary retention -- beltran placed 12/22 - on home flomax -- ?voiding trial prior to d/c  Fall -- PT/OT following - CT head 12/19 (-) acute process but with volume loss, lumbar XR 12/19 no acute fracture, CT C spine 12/19 (-) fracture -- ?etiology fall -- ?due to labile BP's and with COVID ?dry -- s/p IVF;  ?arrhythmia but tele stable w/o arrhythmias and initial EKG ?atrial flutter but looks like underlying artifact and repeat EKG 12/20 with SR, PACs, nonspecific TW changes laterally but

## 2024-12-23 NOTE — PROGRESS NOTES
[facility]  STRZ CCU-STEPDOWN 3B  Occupational Therapy  Daily Note    Discharge Recommendations: ECF with OT  Equipment Recommendations: No defer to next level of care    Time In: 1200  Time Out: 1232  Timed Code Treatment Minutes: 32 Minutes  Minutes: 32          Date: 2024  Patient Name: Ravi Khan,   Gender: male      Room: [address] [place] [address]  MRN: [id number]  : 1935  (89 y.o.)  Referring Practitioner: Walter Gudino APRN - CNP  Diagnosis: COVID-19  Additional Pertinent Hx: Ravi Khan is a 89 y.o. male with PMHx of DMII, CAD, HLD, CKD, HTN who presents to [facility] with fall.  It is reported that the patient was found down on the ground by his son today after having an unwitnessed fall. Patient is non-contributory to HPI due to AMS. Son states that patient is normally independent for ADLs, but now patient is somnolent and not able to do any of the things he normally does. He also states that the patient has appeared to be ill with an upper respiratory infection lately, but is unsure as to how long symptoms have been present. Patient was complaining of back pain, but was unable to verbalize how bad pain was to this provider.    Restrictions/Precautions:  Restrictions/Precautions: General Precautions, Fall Risk, Contact Precautions (covid-19)    Social/Functional History:  Lives With: Spouse  Type of Home: House  Home Layout: One level  Home Access: Stairs to enter with rails  Entrance Stairs - Number of Steps: 5  Entrance Stairs - Rails: Left  Home Equipment: Walker - Rolling, Cane   Bathroom Shower/Tub: Walk-in shower, Shower chair without back  Bathroom Toilet: Standard       Prior Level of Assist for ADLs: Independent  Prior Level of Assist for Homemaking: Independent  Prior Level of Assist for Transfers: Independent  Prior Level of Assist for Ambulation: Independent household ambulator, with or without device  Has the patient had two or more falls in the past

## 2024-12-24 ENCOUNTER — APPOINTMENT (OUTPATIENT)
Dept: GENERAL RADIOLOGY | Age: 89
End: 2024-12-24
Payer: MEDICARE

## 2024-12-24 LAB
ALBUMIN SERPL BCG-MCNC: 3 G/DL (ref 3.5–5.1)
ALP SERPL-CCNC: 61 U/L (ref 38–126)
ALT SERPL W/O P-5'-P-CCNC: 22 U/L (ref 11–66)
ANION GAP SERPL CALC-SCNC: 10 MEQ/L (ref 8–16)
AST SERPL-CCNC: 16 U/L (ref 5–40)
BACTERIA BLD AEROBE CULT: NORMAL
BACTERIA BLD AEROBE CULT: NORMAL
BASOPHILS ABSOLUTE: 0 THOU/MM3 (ref 0–0.1)
BASOPHILS NFR BLD AUTO: 0.4 %
BILIRUB SERPL-MCNC: 0.8 MG/DL (ref 0.3–1.2)
BUN SERPL-MCNC: 32 MG/DL (ref 7–22)
CALCIUM SERPL-MCNC: 8 MG/DL (ref 8.5–10.5)
CHLORIDE SERPL-SCNC: 107 MEQ/L (ref 98–111)
CO2 SERPL-SCNC: 22 MEQ/L (ref 23–33)
CREAT SERPL-MCNC: 1.3 MG/DL (ref 0.4–1.2)
DEPRECATED RDW RBC AUTO: 41.5 FL (ref 35–45)
EOSINOPHIL NFR BLD AUTO: 3.1 %
EOSINOPHILS ABSOLUTE: 0.2 THOU/MM3 (ref 0–0.4)
ERYTHROCYTE [DISTWIDTH] IN BLOOD BY AUTOMATED COUNT: 12 % (ref 11.5–14.5)
FOLATE SERPL-MCNC: 9.6 NG/ML (ref 4.8–24.2)
GFR SERPL CREATININE-BSD FRML MDRD: 53 ML/MIN/1.73M2
GLUCOSE BLD STRIP.AUTO-MCNC: 128 MG/DL (ref 70–108)
GLUCOSE BLD STRIP.AUTO-MCNC: 132 MG/DL (ref 70–108)
GLUCOSE BLD STRIP.AUTO-MCNC: 135 MG/DL (ref 70–108)
GLUCOSE BLD STRIP.AUTO-MCNC: 170 MG/DL (ref 70–108)
GLUCOSE SERPL-MCNC: 126 MG/DL (ref 70–108)
HCT VFR BLD AUTO: 34.5 % (ref 42–52)
HGB BLD-MCNC: 11 GM/DL (ref 14–18)
IMM GRANULOCYTES # BLD AUTO: 0.03 THOU/MM3 (ref 0–0.07)
IMM GRANULOCYTES NFR BLD AUTO: 0.4 %
LYMPHOCYTES ABSOLUTE: 1.3 THOU/MM3 (ref 1–4.8)
LYMPHOCYTES NFR BLD AUTO: 17.6 %
MCH RBC QN AUTO: 29.9 PG (ref 26–33)
MCHC RBC AUTO-ENTMCNC: 31.9 GM/DL (ref 32.2–35.5)
MCV RBC AUTO: 93.8 FL (ref 80–94)
MONOCYTES ABSOLUTE: 0.9 THOU/MM3 (ref 0.4–1.3)
MONOCYTES NFR BLD AUTO: 12.2 %
NEUTROPHILS ABSOLUTE: 4.8 THOU/MM3 (ref 1.8–7.7)
NEUTROPHILS NFR BLD AUTO: 66.3 %
NRBC BLD AUTO-RTO: 0 /100 WBC
PLATELET # BLD AUTO: 130 THOU/MM3 (ref 130–400)
PMV BLD AUTO: 9.3 FL (ref 9.4–12.4)
POTASSIUM SERPL-SCNC: 3.8 MEQ/L (ref 3.5–5.2)
PROT SERPL-MCNC: 5.1 G/DL (ref 6.1–8)
RBC # BLD AUTO: 3.68 MILL/MM3 (ref 4.7–6.1)
SODIUM SERPL-SCNC: 139 MEQ/L (ref 135–145)
T4 FREE SERPL-MCNC: 1.03 NG/DL (ref 0.93–1.68)
TSH SERPL DL<=0.005 MIU/L-ACNC: 4.28 UIU/ML (ref 0.4–4.2)
VIT B12 SERPL-MCNC: 342 PG/ML (ref 211–911)
WBC # BLD AUTO: 7.2 THOU/MM3 (ref 4.8–10.8)

## 2024-12-24 PROCEDURE — 6360000002 HC RX W HCPCS: Performed by: INTERNAL MEDICINE

## 2024-12-24 PROCEDURE — 97116 GAIT TRAINING THERAPY: CPT

## 2024-12-24 PROCEDURE — 82746 ASSAY OF FOLIC ACID SERUM: CPT

## 2024-12-24 PROCEDURE — 6360000002 HC RX W HCPCS

## 2024-12-24 PROCEDURE — 2140000000 HC CCU INTERMEDIATE R&B

## 2024-12-24 PROCEDURE — 51702 INSERT TEMP BLADDER CATH: CPT

## 2024-12-24 PROCEDURE — 80053 COMPREHEN METABOLIC PANEL: CPT

## 2024-12-24 PROCEDURE — 97530 THERAPEUTIC ACTIVITIES: CPT

## 2024-12-24 PROCEDURE — 84443 ASSAY THYROID STIM HORMONE: CPT

## 2024-12-24 PROCEDURE — 2500000003 HC RX 250 WO HCPCS

## 2024-12-24 PROCEDURE — 82607 VITAMIN B-12: CPT

## 2024-12-24 PROCEDURE — 36415 COLL VENOUS BLD VENIPUNCTURE: CPT

## 2024-12-24 PROCEDURE — 6370000000 HC RX 637 (ALT 250 FOR IP)

## 2024-12-24 PROCEDURE — 82948 REAGENT STRIP/BLOOD GLUCOSE: CPT

## 2024-12-24 PROCEDURE — 71046 X-RAY EXAM CHEST 2 VIEWS: CPT

## 2024-12-24 PROCEDURE — 85025 COMPLETE CBC W/AUTO DIFF WBC: CPT

## 2024-12-24 PROCEDURE — 97535 SELF CARE MNGMENT TRAINING: CPT

## 2024-12-24 PROCEDURE — 84439 ASSAY OF FREE THYROXINE: CPT

## 2024-12-24 PROCEDURE — 99233 SBSQ HOSP IP/OBS HIGH 50: CPT | Performed by: FAMILY MEDICINE

## 2024-12-24 RX ORDER — HYDRALAZINE HYDROCHLORIDE 20 MG/ML
5 INJECTION INTRAMUSCULAR; INTRAVENOUS EVERY 6 HOURS PRN
Status: DISCONTINUED | OUTPATIENT
Start: 2024-12-24 | End: 2024-12-27 | Stop reason: HOSPADM

## 2024-12-24 RX ADMIN — SODIUM CHLORIDE, PRESERVATIVE FREE 10 ML: 5 INJECTION INTRAVENOUS at 20:19

## 2024-12-24 RX ADMIN — SODIUM CHLORIDE, PRESERVATIVE FREE 10 ML: 5 INJECTION INTRAVENOUS at 10:19

## 2024-12-24 RX ADMIN — LEVOTHYROXINE SODIUM 25 MCG: 0.03 TABLET ORAL at 05:28

## 2024-12-24 RX ADMIN — OXYCODONE HYDROCHLORIDE AND ACETAMINOPHEN 1000 MG: 500 TABLET ORAL at 10:19

## 2024-12-24 RX ADMIN — ASPIRIN 81 MG: 81 TABLET, COATED ORAL at 10:20

## 2024-12-24 RX ADMIN — TAMSULOSIN HYDROCHLORIDE 0.4 MG: 0.4 CAPSULE ORAL at 10:19

## 2024-12-24 RX ADMIN — HYDRALAZINE HYDROCHLORIDE 10 MG: 20 INJECTION INTRAMUSCULAR; INTRAVENOUS at 10:19

## 2024-12-24 RX ADMIN — ENOXAPARIN SODIUM 40 MG: 100 INJECTION SUBCUTANEOUS at 10:20

## 2024-12-24 ASSESSMENT — PAIN SCALES - GENERAL: PAINLEVEL_OUTOF10: 0

## 2024-12-24 NOTE — CARE COORDINATION
12/24/24, 8:24 AM EST    DISCHARGE PLANNING EVALUATION    Made referral to Elvia at [facility].  Will need new orders for RN, PT and OT.  They are aware he is a potential discharge for today.      Update 11:44 am: Spoke with son Noam.  Made him aware Dr. Padilla does not plan on discharging Ravi today.  Told him that a referral to [facility] has been made and they have accepted for RN, PT and OT. Discussed with Noam PT and OT's recommendations for a skilled facility.  They would like to take him home.  GEORGETTE told him that if it does not work at home he can go to a facility with in 30 days of hospital discharge and Medicare will cover while he is doing therapy and showing improvement.  Encouraged him to call  back with any questions. Home health orders are in.      Update 3:09 pm: Spoke with patients son Noam and the patient and family are now considering ECF at discharge.  Their first choice is [facility].  Made referral to Dwayne at [facility].

## 2024-12-24 NOTE — PROGRESS NOTES
Hospitalist Progress Note      Patient:  Ravi Khan      Unit/Bed:3B-26/026-A    YOB: 1935    MRN: [id number]       Acct: [id number]     PCP: George Conway MD    Date of Admission: 12/19/2024    Date/Time of Evaluation:  12/24/2024 at 2:56 PM    Assessment/Plan:    Acute metabolic encephalopathy -- POA -- improved but pt very [place] so hard to tell if he is always hearing everything but seems stable 12/23 and appropriate -- CT head 12/19 (-) acute process -- u/a 12/20 (-) and only (+) COVID thus felt due to this per chart -- cont monitor   -- checked B12, folate 12/24 and WNL -- TFT 12/24 slightly elevated TSH but normal free T4 thus neither contrib  COVID (+) -- POA -- CXR 12/19 (-) and pt w/o fever but weak and confused on admission and repeat CXR 12/20 with mild patchy airspace opacities in LLL - ?atelectasis vs pneumonia but likely atelectasis as no cough, afeb, -- on RA, no qualification for steroids or baricitinib.  On vit C  -- increase shortness of breath 12/24 - repeat CXR  Mild Rhabdomyolysis, traumatic -- due to fall -- CK 1758 on admission and improving to 237 on 12/22 with IVF from 12/20 - 12/22 -- monitor prn  Labile Bps, ?orthostasis -- up to 180's occ and as low to 90's -- was initially started on midodrine 12/20 and never received and stopped 12/22 as BP's high -->  then home lopressor 12.5 mg bid started 12/21 and received 12/22 and BP 12/23 am in 90's and thus hold lopressor with fall on admission -> BP up again 12/24 179/90 and given hydralazine 10 mg IV x 1 and when up walking with therapy 12/24 am BP down to 119/61 and feeling dizzy  -- cont monitor orthostatics but not good candidate for midodrine   -- decrease prn hydralazine 5 mg prn and increase parameter to >180 but try to avoid  -- ??need different BP med at lower dose as lopressor 12.5 mg bid dropped pressures to 88-90's on 12/23 -- currently held and cont monitor  --> ??need echo or carotid to further eval    12/19/2024 07:59 PM      All CTs at this facility use dose modulation techniques and iterative    reconstructions, and/or weight-based dosing   when appropriate to reduce radiation to a low as reasonably achievable.      CT Head W/O Contrast   Final Result   Impression:      1. No acute intracranial hemorrhage.      2. Cerebral volume loss. Cerebral white matter disease, likely ischemic    microvascular in nature      This document has been electronically signed by: Jayda Chambers MD on    12/19/2024 07:59 PM      All CTs at this facility use dose modulation techniques and iterative    reconstructions, and/or weight-based dosing   when appropriate to reduce radiation to a low as reasonably achievable.      XR PELVIS (1-2 VIEWS)   Final Result   Impression:      1. No acute fracture.      If there is concern for occult fracture, then CT may be considered.      This document has been electronically signed by: Jayda Chambers MD on    12/19/2024 08:12 PM      XR LUMBAR SPINE (2-3 VIEWS)   Final Result   Impression:      1. Limited exam. No gross acute lumbar spine fracture identified however    evaluation is limited and fracture could potentially be missed. CT could    be considered for further evaluation.      2. 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      This document has been electronically signed by: Jayda Chambers MD on    12/19/2024 07:58 PM      XR CHEST PORTABLE   Final Result   Impression:      1. No acute cardiopulmonary disease seen.      This document has been electronically signed by: Jayda Chambers MD on    12/19/2024 07:56 PM            LDA: []CVC / []PICC / []Midline / [x]Klein / []Drains / []Mediport / []None  Antibiotics: no  Steroids: no  Labs (still needed?): [x]Yes / []No  IVF (still needed?): []Yes / [x]No    Level of care: [x]Step Down / []Med-Surg  Bed Status: [x]Inpatient / []Observation  Telemetry: [x]Yes / []No -- Review last 24 hrs = SR/ST

## 2024-12-24 NOTE — PLAN OF CARE
Problem: Chronic Conditions and Co-morbidities  Goal: Patient's chronic conditions and co-morbidity symptoms are monitored and maintained or improved  Outcome: Progressing  Flowsheets (Taken 12/24/2024 3140)  Care Plan - Patient's Chronic Conditions and Co-Morbidity Symptoms are Monitored and Maintained or Improved:   Monitor and assess patient's chronic conditions and comorbid symptoms for stability, deterioration, or improvement   Collaborate with multidisciplinary team to address chronic and comorbid conditions and prevent exacerbation or deterioration

## 2024-12-24 NOTE — PROGRESS NOTES
[facility]  INPATIENT PHYSICAL THERAPY  DAILY NOTE  [facility] CCU-STEPDOWN 3B - 3B-26/026-A      Discharge Recommendations: Continue to assess pending progress, Subacte/Skilled Nursing Facility, 24 hour assistance or supervision, and Patient would benefit from continued PT at discharge  Equipment Recommendations: No               Time In: 1016  Time Out: 1050  Timed Code Treatment Minutes: 34 Minutes  Minutes: 34          Date: 2024  Patient Name: Ravi Khan,  Gender:  male        MRN: [id number]  : 1935  (89 y.o.)     Referring Practitioner: Walter Gudino APRN - CNP  Diagnosis: COVID-19  Additional Pertinent Hx: Ravi Khan is a 89 y.o. male with PMHx of DMII, CAD, HLD, CKD, HTN who presents to [facility] with fall. It is reported that the patient was found down on the ground by his son today after having an unwitnessed fall. Patient is non-contributory to HPI due to AMS. Son states that patient is normally independent for ADLs, but now patient is somnolent and not able to do any of the things he normally does. He also states that the patient has appeared to be ill with an upper respiratory infection lately, but is unsure as to how long symptoms have been present. Patient was complaining of back pain, but was unable to verbalize how bad pain was to this provider.     Prior Level of Function:  Lives With: Spouse  Type of Home: House  Home Layout: One level  Home Access: Stairs to enter with rails  Entrance Stairs - Number of Steps: 5  Entrance Stairs - Rails: Left  Home Equipment: Walker - Rolling, Cane   Bathroom Shower/Tub: Walk-in shower, Shower chair without back  Bathroom Toilet: Standard    Prior Level of Assist for ADLs: Independent  Prior Level of Assist for Homemaking: Independent  Prior Level of Assist for Transfers: Independent  Active : No  Additional Comments: Patient states he uses a cane at home.  Prior Level of Assist for Ambulation:  MOBILITY  AM-PAC Inpatient Mobility Raw Score : 18  AM-PAC Inpatient T-Scale Score : 43.63        Modified [place] Scale:  Not Applicable    ASSESSMENT:  Assessment: Patient progressing toward established goals.  Activity Tolerance:  Patient tolerance of  treatment:Fair.  Plan: Current Treatment Recommendations: Strengthening, Balance training, Functional mobility training, Transfer training, Endurance training, Gait training, Stair training, Neuromuscular re-education, Therapeutic activities, Patient/Caregiver education & training, Safety education & training  General Plan:  (3-5x GM)    Education:  Learners: Patient  Patient Education: Bed Mobility, Transfers, Gait, safety concerns for home mobility    Goals:  Patient Goals : None stated  Short Term Goals  Time Frame for Short Term Goals: By discharge  Short Term Goal 1: Patient to demonstrate (I) bed mobility w/o rails.  Short Term Goal 2: Patient to transfer sit to stand with SBA.  Short Term Goal 3: Patient to ambulate 30 feet with 2WW with SBA.  Short Term Goal 4: Patient to ascend/descend 5 steps with (L) rail simulating home entry with CGA  Long Term Goals  Time Frame for Long Term Goals : N/A due to short ELOS    Following session, patient left in safe position with all fall risk precautions in place.

## 2024-12-25 PROBLEM — R33.9 URINARY RETENTION: Status: ACTIVE | Noted: 2024-12-25

## 2024-12-25 PROBLEM — D53.9 MACROCYTIC ANEMIA: Status: ACTIVE | Noted: 2024-12-25

## 2024-12-25 PROBLEM — R53.81 PHYSICAL DECONDITIONING: Status: ACTIVE | Noted: 2024-12-25

## 2024-12-25 PROBLEM — E11.9 TYPE 2 DIABETES MELLITUS TREATED WITHOUT INSULIN (HCC): Status: ACTIVE | Noted: 2024-04-19

## 2024-12-25 PROBLEM — G93.41 METABOLIC ENCEPHALOPATHY: Status: ACTIVE | Noted: 2024-12-25

## 2024-12-25 PROBLEM — E03.9 HYPOTHYROIDISM: Status: ACTIVE | Noted: 2024-12-25

## 2024-12-25 PROBLEM — D69.6 THROMBOCYTOPENIA (HCC): Status: ACTIVE | Noted: 2024-12-25

## 2024-12-25 PROBLEM — T79.6XXA TRAUMATIC RHABDOMYOLYSIS (HCC): Status: ACTIVE | Noted: 2024-12-25

## 2024-12-25 PROBLEM — I51.89 DIASTOLIC DYSFUNCTION: Status: ACTIVE | Noted: 2024-12-25

## 2024-12-25 PROBLEM — R09.89 LABILE BLOOD PRESSURE: Status: ACTIVE | Noted: 2024-12-25
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
## 2024-12-25 LAB
ALBUMIN SERPL BCG-MCNC: 3.3 G/DL (ref 3.5–5.1)
ALP SERPL-CCNC: 65 U/L (ref 38–126)
ALT SERPL W/O P-5'-P-CCNC: 21 U/L (ref 11–66)
ANION GAP SERPL CALC-SCNC: 12 MEQ/L (ref 8–16)
AST SERPL-CCNC: 16 U/L (ref 5–40)
BASOPHILS ABSOLUTE: 0.1 THOU/MM3 (ref 0–0.1)
BASOPHILS NFR BLD AUTO: 0.6 %
BILIRUB SERPL-MCNC: 0.9 MG/DL (ref 0.3–1.2)
BUN SERPL-MCNC: 30 MG/DL (ref 7–22)
CALCIUM SERPL-MCNC: 8.4 MG/DL (ref 8.5–10.5)
CHLORIDE SERPL-SCNC: 109 MEQ/L (ref 98–111)
CO2 SERPL-SCNC: 21 MEQ/L (ref 23–33)
CREAT SERPL-MCNC: 1.1 MG/DL (ref 0.4–1.2)
DEPRECATED RDW RBC AUTO: 41.1 FL (ref 35–45)
EOSINOPHIL NFR BLD AUTO: 2.9 %
EOSINOPHILS ABSOLUTE: 0.2 THOU/MM3 (ref 0–0.4)
ERYTHROCYTE [DISTWIDTH] IN BLOOD BY AUTOMATED COUNT: 12.1 % (ref 11.5–14.5)
GFR SERPL CREATININE-BSD FRML MDRD: 64 ML/MIN/1.73M2
GLUCOSE BLD STRIP.AUTO-MCNC: 128 MG/DL (ref 70–108)
GLUCOSE BLD STRIP.AUTO-MCNC: 136 MG/DL (ref 70–108)
GLUCOSE BLD STRIP.AUTO-MCNC: 148 MG/DL (ref 70–108)
GLUCOSE BLD STRIP.AUTO-MCNC: 175 MG/DL (ref 70–108)
GLUCOSE SERPL-MCNC: 138 MG/DL (ref 70–108)
HCT VFR BLD AUTO: 36.6 % (ref 42–52)
HGB BLD-MCNC: 11.8 GM/DL (ref 14–18)
IMM GRANULOCYTES # BLD AUTO: 0.04 THOU/MM3 (ref 0–0.07)
IMM GRANULOCYTES NFR BLD AUTO: 0.5 %
LYMPHOCYTES ABSOLUTE: 1.5 THOU/MM3 (ref 1–4.8)
LYMPHOCYTES NFR BLD AUTO: 17.6 %
MCH RBC QN AUTO: 30.1 PG (ref 26–33)
MCHC RBC AUTO-ENTMCNC: 32.2 GM/DL (ref 32.2–35.5)
MCV RBC AUTO: 93.4 FL (ref 80–94)
MONOCYTES ABSOLUTE: 1.1 THOU/MM3 (ref 0.4–1.3)
MONOCYTES NFR BLD AUTO: 12.5 %
NEUTROPHILS ABSOLUTE: 5.6 THOU/MM3 (ref 1.8–7.7)
NEUTROPHILS NFR BLD AUTO: 65.9 %
NRBC BLD AUTO-RTO: 0 /100 WBC
PLATELET # BLD AUTO: 162 THOU/MM3 (ref 130–400)
PMV BLD AUTO: 9.3 FL (ref 9.4–12.4)
POTASSIUM SERPL-SCNC: 4.4 MEQ/L (ref 3.5–5.2)
PROT SERPL-MCNC: 5.5 G/DL (ref 6.1–8)
RBC # BLD AUTO: 3.92 MILL/MM3 (ref 4.7–6.1)
SODIUM SERPL-SCNC: 142 MEQ/L (ref 135–145)
WBC # BLD AUTO: 8.5 THOU/MM3 (ref 4.8–10.8)

## 2024-12-25 PROCEDURE — 2500000003 HC RX 250 WO HCPCS

## 2024-12-25 PROCEDURE — 6370000000 HC RX 637 (ALT 250 FOR IP)

## 2024-12-25 PROCEDURE — 82948 REAGENT STRIP/BLOOD GLUCOSE: CPT

## 2024-12-25 PROCEDURE — 36415 COLL VENOUS BLD VENIPUNCTURE: CPT

## 2024-12-25 PROCEDURE — 85025 COMPLETE CBC W/AUTO DIFF WBC: CPT

## 2024-12-25 PROCEDURE — 99232 SBSQ HOSP IP/OBS MODERATE 35: CPT

## 2024-12-25 PROCEDURE — 6360000002 HC RX W HCPCS

## 2024-12-25 PROCEDURE — 80053 COMPREHEN METABOLIC PANEL: CPT

## 2024-12-25 PROCEDURE — 2140000000 HC CCU INTERMEDIATE R&B

## 2024-12-25 PROCEDURE — 51702 INSERT TEMP BLADDER CATH: CPT

## 2024-12-25 RX ADMIN — SODIUM CHLORIDE, PRESERVATIVE FREE 10 ML: 5 INJECTION INTRAVENOUS at 21:02

## 2024-12-25 RX ADMIN — TAMSULOSIN HYDROCHLORIDE 0.4 MG: 0.4 CAPSULE ORAL at 09:10

## 2024-12-25 RX ADMIN — SODIUM CHLORIDE, PRESERVATIVE FREE 10 ML: 5 INJECTION INTRAVENOUS at 09:11

## 2024-12-25 RX ADMIN — OXYCODONE HYDROCHLORIDE AND ACETAMINOPHEN 1000 MG: 500 TABLET ORAL at 09:10

## 2024-12-25 RX ADMIN — ENOXAPARIN SODIUM 40 MG: 100 INJECTION SUBCUTANEOUS at 09:10

## 2024-12-25 RX ADMIN — ASPIRIN 81 MG: 81 TABLET, COATED ORAL at 09:10

## 2024-12-25 ASSESSMENT — PAIN SCALES - GENERAL: PAINLEVEL_OUTOF10: 0

## 2024-12-25 NOTE — PROGRESS NOTES
Hospitalist Progress Note      Patient:  Ravi Khan 89 y.o. male     : 1935  Unit/Bed:3B-26/026-A  Date of Admission: 2024        ASSESSMENT AND PLAN    Active Problems  #TME: PoA. Suspect related to infectious etiology on admission given lack of other likely etiologies. Improved, patient A&O on assessment, though intermittently confused. ?hospital delirium contributing at this point. Will continue to monitor, delirium precautions  #COVID: PoA. Mild patchy airspace disease noted on CXR. No noted hypoxia, not currently on steroids/ivana. Will monitor  #Orthostatic hypotension with supine hypertension: Orthostats+ with a significant systolic decrease on standing with resultant dizziness. Supine patient remains intermittently hypertensive. Continue on hydralazine/lopressor at this time. Patient likely poor midodrine candidate given cardiac hx, will hold off at this time, though will likely need to consider if remains persistently asymptomatic. ECHO ordered to assess current cardiac function given time interval from previous. Repeat orthostats pending, will consider flowmax decrease/discontinuation if fails to improve. ANNALISE hose/abdominal binder ordered  #Urinary retention: Noted, on home Flowmax. Consider void trial in AM  #Fall: In setting of above, clinically appears as orthostasis likely etiology. PT/OT  #Elevated troponin: Likely demand, trops stable  #Thrombocytopenia: Noted, platelets stable. Suspect in setting of COVID. Will monitor  Resolved Problems  #Rhabdomyolysis: Mild on presentation, s/p IVF with improvement of CK  Chronic Conditions (reviewed and stable unless otherwise stated)  #CKDIIIa: Baseline appears 1.1-1.2. Cr stable, will continue to monitor  #Chronic macrocytic anemia: Noted, no overt bleeding. Will monitor  #NIDDMII: Low-dose SSI, hypoglycemia protocol, accu-checks  #Deconditioning: PT/OT, plan SNF placement  #HFpEF: Not acutely exacerbated. ECHO per above, repeat pending.

## 2024-12-25 NOTE — PLAN OF CARE
Problem: Pain  Goal: Verbalizes/displays adequate comfort level or baseline comfort level  12/25/2024 1110 by Jocelyn Holland RN  Outcome: Progressing  Flowsheets (Taken 12/25/2024 0907)  Verbalizes/displays adequate comfort level or baseline comfort level:   Assess pain using appropriate pain scale   Encourage patient to monitor pain and request assistance   Administer analgesics based on type and severity of pain and evaluate response   Implement non-pharmacological measures as appropriate and evaluate response   Pain Assessment: 0-10  Pain Level: 0   Patient's Stated Pain Goal: 0 - No pain   Is pain goal met at this time?  Yes     Problem: Chronic Conditions and Co-morbidities  Goal: Patient's chronic conditions and co-morbidity symptoms are monitored and maintained or improved  12/25/2024 1110 by Jocelyn Holland RN  Outcome: Progressing  Flowsheets (Taken 12/25/2024 0907)  Care Plan - Patient's Chronic Conditions and Co-Morbidity Symptoms are Monitored and Maintained or Improved:   Monitor and assess patient's chronic conditions and comorbid symptoms for stability, deterioration, or improvement   Collaborate with multidisciplinary team to address chronic and comorbid conditions and prevent exacerbation or deterioration     Problem: Discharge Planning  Goal: Discharge to home or other facility with appropriate resources  12/25/2024 1110 by Jocelyn Holland RN  Outcome: Progressing  Flowsheets (Taken 12/25/2024 0907)  Discharge to home or other facility with appropriate resources:   Identify discharge learning needs (meds, wound care, etc)   Arrange for needed discharge resources and transportation as appropriate   Identify barriers to discharge with patient and caregiver   Discharge plan is undetermined. SNF vs home with wife and HHC.    Problem: Safety - Adult  Goal: Free from fall injury  12/25/2024 1110 by Jocelyn Holland RN  Outcome: Progressing   Fall assessment completed. Patient using call light appropriately  cardiac dysrhythmias or at baseline: Monitor cardiac rate and rhythm     Problem: Skin/Tissue Integrity - Adult  Goal: Skin integrity remains intact  12/25/2024 1110 by Jocelyn Holland RN  Outcome: Progressing  Flowsheets (Taken 12/25/2024 0907)  Skin Integrity Remains Intact: Monitor for areas of redness and/or skin breakdown     Problem: Skin/Tissue Integrity - Adult  Goal: Incisions, wounds, or drain sites healing without S/S of infection  12/25/2024 1110 by Jocelyn Holland RN  Outcome: Progressing  Flowsheets (Taken 12/25/2024 0907)  Incisions, Wounds, or Drain Sites Healing Without Sign and Symptoms of Infection: TWICE DAILY: Assess and document skin integrity     Problem: Musculoskeletal - Adult  Goal: Return mobility to safest level of function  12/25/2024 1110 by Jocelyn Holland RN  Outcome: Progressing  Flowsheets (Taken 12/25/2024 0907)  Return Mobility to Safest Level of Function:   Assess patient stability and activity tolerance for standing, transferring and ambulating with or without assistive devices   Obtain physical therapy/occupational therapy consults as needed     Problem: Musculoskeletal - Adult  Goal: Return ADL status to a safe level of function  12/25/2024 1110 by Jocelyn Holland RN  Outcome: Progressing  Flowsheets (Taken 12/25/2024 0907)  Return ADL Status to a Safe Level of Function:   Administer medication as ordered   Assess activities of daily living deficits and provide assistive devices as needed   Obtain physical therapy/occupational therapy consults as needed   Assist and instruct patient to increase activity and self care as tolerated   Patient up with assist and walker. PT/OT working with patient.    Problem: Genitourinary - Adult  Goal: Absence of urinary retention  12/25/2024 1110 by Jocelyn Holland RN  Outcome: Progressing  Flowsheets (Taken 12/25/2024 0907)  Absence of urinary retention: Assess patient’s ability to void and empty bladder   Patient has beltran catheter draining

## 2024-12-25 NOTE — PROGRESS NOTES
[facility]  STRZ CCU-STEPDOWN 3B  Occupational Therapy  Daily Note    Discharge Recommendations: Continue to assess pending progress  Equipment Recommendations: No defer to next level of care       Time In: 1805  Time Out: 1830  Timed Code Treatment Minutes: 25 Minutes  Minutes: 25          Date: 2024  Patient Name: Ravi Khan,   Gender: male      Room: [address] [place] [address]  MRN: [id number]  : 1935  (89 y.o.)  Referring Practitioner: Walter Gudino APRN - CNP  Diagnosis: COVID-19  Additional Pertinent Hx: Ravi Khan is a 89 y.o. male with PMHx of DMII, CAD, HLD, CKD, HTN who presents to [facility] with fall.  It is reported that the patient was found down on the ground by his son today after having an unwitnessed fall. Patient is non-contributory to HPI due to AMS. Son states that patient is normally independent for ADLs, but now patient is somnolent and not able to do any of the things he normally does. He also states that the patient has appeared to be ill with an upper respiratory infection lately, but is unsure as to how long symptoms have been present. Patient was complaining of back pain, but was unable to verbalize how bad pain was to this provider.    Restrictions/Precautions:  Restrictions/Precautions: General Precautions, Fall Risk, Contact Precautions (covid-19)     Social/Functional History:  Lives With: Spouse  Type of Home: House  Home Layout: One level  Home Access: Stairs to enter with rails  Entrance Stairs - Number of Steps: 5  Entrance Stairs - Rails: Left  Home Equipment: Walker - Rolling, Cane   Bathroom Shower/Tub: Walk-in shower, Shower chair without back  Bathroom Toilet: Standard       Prior Level of Assist for ADLs: Independent  Prior Level of Assist for Homemaking: Independent  Prior Level of Assist for Transfers: Independent  Prior Level of Assist for Ambulation: Independent household ambulator, with or without device  Has the patient had two  treatment: Fair treatment tolerance    Pt had SOB when he returned from he walk.  He needed cues to not walk too far, otherwise it may be too difficult to make it all of the way back.   Plan: Times Per Week: 5x  Current Treatment Recommendations: Strengthening, Balance training, Functional mobility training, Endurance training, Safety education & training, Patient/Caregiver education & training, Cognitive reorientation, Equipment evaluation, education, & procurement, Self-Care / ADL    Education:  Learners: Patient  Reviewed Prior Education and Importance of Increasing Activity    Goals  Short Term Goals  Time Frame for Short Term Goals: until discharge  Short Term Goal 1: Patient will safely complete various functional transfers with SBA to improve safety in living environment.  Short Term Goal 2: Patient will improve functional ambulation to/from bathroom with CGA and LRAD in prep for return to PLOF.  Short Term Goal 3: Patient will complete UB/LB dressing/bathing with CGA and use of LHAE prn and energy conservation techniques to improve ease in BADL routine.  Short Term Goal 4: Patient will tolerate standing 4-6 minutes with unilateral UE release reaching ouside base of support with SBA to complete sink side grooming.  Long Term Goals  Time Frame for Long Term Goals : None due to ELOS    Following session, patient left in safe position with all fall risk precautions in place.

## 2024-12-25 NOTE — PLAN OF CARE
Problem: Pain  Goal: Verbalizes/displays adequate comfort level or baseline comfort level  12/24/2024 2252 by Benoit Quezada RN  Outcome: Progressing  Flowsheets (Taken 12/24/2024 2252)  Verbalizes/displays adequate comfort level or baseline comfort level: Encourage patient to monitor pain and request assistance     Problem: Chronic Conditions and Co-morbidities  Goal: Patient's chronic conditions and co-morbidity symptoms are monitored and maintained or improved  12/24/2024 2252 by Benoit Quezada RN  Outcome: Progressing  Flowsheets (Taken 12/24/2024 2252)  Care Plan - Patient's Chronic Conditions and Co-Morbidity Symptoms are Monitored and Maintained or Improved: Monitor and assess patient's chronic conditions and comorbid symptoms for stability, deterioration, or improvement     Problem: Discharge Planning  Goal: Discharge to home or other facility with appropriate resources  12/24/2024 2252 by Benoit Quezada RN  Outcome: Progressing  Flowsheets (Taken 12/24/2024 2252)  Discharge to home or other facility with appropriate resources:   Identify barriers to discharge with patient and caregiver   Refer to discharge planning if patient needs post-hospital services based on physician order or complex needs related to functional status, cognitive ability or social support system   Identify discharge learning needs (meds, wound care, etc)   Arrange for needed discharge resources and transportation as appropriate     Problem: Safety - Adult  Goal: Free from fall injury  12/24/2024 2252 by Benoit Quezada RN  Outcome: Progressing  Flowsheets (Taken 12/24/2024 2252)  Free From Fall Injury: Instruct family/caregiver on patient safety     Problem: ABCDS Injury Assessment  Goal: Absence of physical injury  12/24/2024 2252 by Benoit Quezada RN  Outcome: Progressing  Flowsheets (Taken 12/24/2024 2252)  Absence of Physical Injury: Implement safety measures based on patient assessment     Problem: Skin/Tissue

## 2024-12-26 ENCOUNTER — APPOINTMENT (OUTPATIENT)
Age: 89
End: 2024-12-26
Payer: MEDICARE

## 2024-12-26 LAB
ANION GAP SERPL CALC-SCNC: 12 MEQ/L (ref 8–16)
BUN SERPL-MCNC: 30 MG/DL (ref 7–22)
CALCIUM SERPL-MCNC: 8.7 MG/DL (ref 8.5–10.5)
CHLORIDE SERPL-SCNC: 107 MEQ/L (ref 98–111)
CO2 SERPL-SCNC: 23 MEQ/L (ref 23–33)
CREAT SERPL-MCNC: 1.4 MG/DL (ref 0.4–1.2)
DEPRECATED RDW RBC AUTO: 41.1 FL (ref 35–45)
ECHO AO ASC DIAM: 3.9 CM
ECHO AO ASCENDING AORTA INDEX: 1.99 CM/M2
ECHO AV CUSP MM: 2.2 CM
ECHO AV PEAK GRADIENT: 6 MMHG
ECHO AV PEAK VELOCITY: 1.3 M/S
ECHO AV VELOCITY RATIO: 0.54
ECHO BSA: 1.97 M2
ECHO EST RA PRESSURE: 5 MMHG
ECHO LA AREA 2C: 14.8 CM2
ECHO LA AREA 4C: 20.2 CM2
ECHO LA DIAMETER INDEX: 2.3 CM/M2
ECHO LA DIAMETER: 4.5 CM
ECHO LA MAJOR AXIS: 5.9 CM
ECHO LA MINOR AXIS: 4.8 CM
ECHO LA VOL BP: 51 ML (ref 18–58)
ECHO LA VOL MOD A2C: 38 ML (ref 18–58)
ECHO LA VOL MOD A4C: 55 ML (ref 18–58)
ECHO LA VOL/BSA BIPLANE: 26 ML/M2 (ref 16–34)
ECHO LA VOLUME INDEX MOD A2C: 19 ML/M2 (ref 16–34)
ECHO LA VOLUME INDEX MOD A4C: 28 ML/M2 (ref 16–34)
ECHO LV E' LATERAL VELOCITY: 11.4 CM/S
ECHO LV E' SEPTAL VELOCITY: 4.1 CM/S
ECHO LV EJECTION FRACTION BIPLANE: 63 % (ref 55–100)
ECHO LV FRACTIONAL SHORTENING: 33 % (ref 28–44)
ECHO LV INTERNAL DIMENSION DIASTOLE INDEX: 1.99 CM/M2
ECHO LV INTERNAL DIMENSION DIASTOLIC: 3.9 CM (ref 4.2–5.9)
ECHO LV INTERNAL DIMENSION SYSTOLIC INDEX: 1.33 CM/M2
ECHO LV INTERNAL DIMENSION SYSTOLIC: 2.6 CM
ECHO LV ISOVOLUMETRIC RELAXATION TIME (IVRT): 81 MS
ECHO LV IVSD: 1 CM (ref 0.6–1)
ECHO LV MASS 2D: 122.1 G (ref 88–224)
ECHO LV MASS INDEX 2D: 62.3 G/M2 (ref 49–115)
ECHO LV POSTERIOR WALL DIASTOLIC: 1 CM (ref 0.6–1)
ECHO LV RELATIVE WALL THICKNESS RATIO: 0.51
ECHO LVOT PEAK GRADIENT: 2 MMHG
ECHO LVOT PEAK VELOCITY: 0.7 M/S
ECHO MV A VELOCITY: 1 M/S
ECHO MV E DECELERATION TIME (DT): 320 MS
ECHO MV E VELOCITY: 0.56 M/S
ECHO MV E/A RATIO: 0.56
ECHO MV E/E' LATERAL: 4.91
ECHO MV E/E' RATIO (AVERAGED): 9.29
ECHO MV E/E' SEPTAL: 13.66
ECHO PULMONARY ARTERY END DIASTOLIC PRESSURE: 8 MMHG
ECHO PV MAX VELOCITY: 0.9 M/S
ECHO PV PEAK GRADIENT: 3 MMHG
ECHO PV REGURGITANT MAX VELOCITY: 1.4 M/S
ECHO RIGHT VENTRICULAR SYSTOLIC PRESSURE (RVSP): 25 MMHG
ECHO RV INTERNAL DIMENSION: 3.3 CM
ECHO TV E WAVE: 0.8 M/S
ECHO TV REGURGITANT MAX VELOCITY: 2.21 M/S
ECHO TV REGURGITANT PEAK GRADIENT: 20 MMHG
ERYTHROCYTE [DISTWIDTH] IN BLOOD BY AUTOMATED COUNT: 11.9 % (ref 11.5–14.5)
GFR SERPL CREATININE-BSD FRML MDRD: 48 ML/MIN/1.73M2
GLUCOSE BLD STRIP.AUTO-MCNC: 130 MG/DL (ref 70–108)
GLUCOSE BLD STRIP.AUTO-MCNC: 162 MG/DL (ref 70–108)
GLUCOSE BLD STRIP.AUTO-MCNC: 171 MG/DL (ref 70–108)
GLUCOSE BLD STRIP.AUTO-MCNC: 189 MG/DL (ref 70–108)
GLUCOSE BLD STRIP.AUTO-MCNC: 192 MG/DL (ref 70–108)
GLUCOSE BLD STRIP.AUTO-MCNC: 213 MG/DL (ref 70–108)
GLUCOSE SERPL-MCNC: 159 MG/DL (ref 70–108)
HCT VFR BLD AUTO: 36.8 % (ref 42–52)
HGB BLD-MCNC: 11.9 GM/DL (ref 14–18)
MCH RBC QN AUTO: 30.2 PG (ref 26–33)
MCHC RBC AUTO-ENTMCNC: 32.3 GM/DL (ref 32.2–35.5)
MCV RBC AUTO: 93.4 FL (ref 80–94)
PLATELET # BLD AUTO: 187 THOU/MM3 (ref 130–400)
PMV BLD AUTO: 9.1 FL (ref 9.4–12.4)
POTASSIUM SERPL-SCNC: 4.5 MEQ/L (ref 3.5–5.2)
RBC # BLD AUTO: 3.94 MILL/MM3 (ref 4.7–6.1)
SODIUM SERPL-SCNC: 142 MEQ/L (ref 135–145)
WBC # BLD AUTO: 7.4 THOU/MM3 (ref 4.8–10.8)

## 2024-12-26 PROCEDURE — 99232 SBSQ HOSP IP/OBS MODERATE 35: CPT

## 2024-12-26 PROCEDURE — 80048 BASIC METABOLIC PNL TOTAL CA: CPT

## 2024-12-26 PROCEDURE — 6370000000 HC RX 637 (ALT 250 FOR IP)

## 2024-12-26 PROCEDURE — 93306 TTE W/DOPPLER COMPLETE: CPT | Performed by: NUCLEAR MEDICINE

## 2024-12-26 PROCEDURE — 36415 COLL VENOUS BLD VENIPUNCTURE: CPT

## 2024-12-26 PROCEDURE — 2140000000 HC CCU INTERMEDIATE R&B

## 2024-12-26 PROCEDURE — 82948 REAGENT STRIP/BLOOD GLUCOSE: CPT

## 2024-12-26 PROCEDURE — 93306 TTE W/DOPPLER COMPLETE: CPT

## 2024-12-26 PROCEDURE — 6360000002 HC RX W HCPCS

## 2024-12-26 PROCEDURE — 51798 US URINE CAPACITY MEASURE: CPT

## 2024-12-26 PROCEDURE — 85027 COMPLETE CBC AUTOMATED: CPT

## 2024-12-26 PROCEDURE — 97110 THERAPEUTIC EXERCISES: CPT

## 2024-12-26 PROCEDURE — 2500000003 HC RX 250 WO HCPCS

## 2024-12-26 RX ORDER — SENNOSIDES A AND B 8.6 MG/1
1 TABLET, FILM COATED ORAL NIGHTLY
Status: DISCONTINUED | OUTPATIENT
Start: 2024-12-26 | End: 2024-12-27 | Stop reason: HOSPADM

## 2024-12-26 RX ORDER — DOCUSATE SODIUM 100 MG/1
100 CAPSULE, LIQUID FILLED ORAL DAILY
Status: DISCONTINUED | OUTPATIENT
Start: 2024-12-26 | End: 2024-12-27 | Stop reason: HOSPADM

## 2024-12-26 RX ADMIN — INSULIN LISPRO 1 UNITS: 100 INJECTION, SOLUTION INTRAVENOUS; SUBCUTANEOUS at 13:25

## 2024-12-26 RX ADMIN — SENNOSIDES 8.6 MG: 8.6 TABLET, FILM COATED ORAL at 21:45

## 2024-12-26 RX ADMIN — ACETAMINOPHEN 650 MG: 325 TABLET ORAL at 08:29

## 2024-12-26 RX ADMIN — ENOXAPARIN SODIUM 40 MG: 100 INJECTION SUBCUTANEOUS at 08:22

## 2024-12-26 RX ADMIN — OXYCODONE HYDROCHLORIDE AND ACETAMINOPHEN 1000 MG: 500 TABLET ORAL at 08:22

## 2024-12-26 RX ADMIN — DOCUSATE SODIUM 100 MG: 100 CAPSULE, LIQUID FILLED ORAL at 13:25

## 2024-12-26 RX ADMIN — LEVOTHYROXINE SODIUM 25 MCG: 0.03 TABLET ORAL at 05:36

## 2024-12-26 RX ADMIN — SODIUM CHLORIDE, PRESERVATIVE FREE 10 ML: 5 INJECTION INTRAVENOUS at 21:46

## 2024-12-26 RX ADMIN — INSULIN LISPRO 1 UNITS: 100 INJECTION, SOLUTION INTRAVENOUS; SUBCUTANEOUS at 00:13

## 2024-12-26 RX ADMIN — SODIUM CHLORIDE, PRESERVATIVE FREE 10 ML: 5 INJECTION INTRAVENOUS at 08:22

## 2024-12-26 RX ADMIN — TAMSULOSIN HYDROCHLORIDE 0.4 MG: 0.4 CAPSULE ORAL at 08:22

## 2024-12-26 RX ADMIN — ASPIRIN 81 MG: 81 TABLET, COATED ORAL at 08:22

## 2024-12-26 ASSESSMENT — PAIN - FUNCTIONAL ASSESSMENT: PAIN_FUNCTIONAL_ASSESSMENT: ACTIVITIES ARE NOT PREVENTED

## 2024-12-26 ASSESSMENT — PAIN SCALES - GENERAL
PAINLEVEL_OUTOF10: 0
PAINLEVEL_OUTOF10: 3

## 2024-12-26 ASSESSMENT — PAIN DESCRIPTION - PAIN TYPE: TYPE: CHRONIC PAIN

## 2024-12-26 ASSESSMENT — PAIN DESCRIPTION - ORIENTATION: ORIENTATION: MID;LOWER;RIGHT;LEFT

## 2024-12-26 ASSESSMENT — PAIN DESCRIPTION - LOCATION: LOCATION: BACK;SHOULDER

## 2024-12-26 ASSESSMENT — PAIN DESCRIPTION - DESCRIPTORS: DESCRIPTORS: ACHING

## 2024-12-26 NOTE — PLAN OF CARE
Problem: Pain  Goal: Verbalizes/displays adequate comfort level or baseline comfort level  12/25/2024 2120 by Benoit Quezada RN  Outcome: Progressing  Flowsheets (Taken 12/25/2024 2120)  Verbalizes/displays adequate comfort level or baseline comfort level: Encourage patient to monitor pain and request assistance     Problem: Chronic Conditions and Co-morbidities  Goal: Patient's chronic conditions and co-morbidity symptoms are monitored and maintained or improved  12/25/2024 2120 by Benoit Quezada RN  Outcome: Progressing  Flowsheets (Taken 12/25/2024 2120)  Care Plan - Patient's Chronic Conditions and Co-Morbidity Symptoms are Monitored and Maintained or Improved: Monitor and assess patient's chronic conditions and comorbid symptoms for stability, deterioration, or improvement     Problem: Discharge Planning  Goal: Discharge to home or other facility with appropriate resources  12/25/2024 2120 by Benoit Quezada RN  Outcome: Progressing  Flowsheets (Taken 12/25/2024 2120)  Discharge to home or other facility with appropriate resources:   Identify discharge learning needs (meds, wound care, etc)   Identify barriers to discharge with patient and caregiver   Refer to discharge planning if patient needs post-hospital services based on physician order or complex needs related to functional status, cognitive ability or social support system   Arrange for needed discharge resources and transportation as appropriate     Problem: Safety - Adult  Goal: Free from fall injury  12/25/2024 2120 by Benoit Quezada RN  Outcome: Progressing  Flowsheets (Taken 12/25/2024 2120)  Free From Fall Injury: Instruct family/caregiver on patient safety     Problem: ABCDS Injury Assessment  Goal: Absence of physical injury  12/25/2024 2120 by Benoit Quezada RN  Outcome: Progressing  Flowsheets (Taken 12/25/2024 2120)  Absence of Physical Injury: Implement safety measures based on patient assessment     Problem: Skin/Tissue

## 2024-12-26 NOTE — PROGRESS NOTES
Hospitalist Progress Note      Patient:  Ravi Khan 89 y.o. male     : 1935  Unit/Bed:3B-26/026-A  Date of Admission: 2024        ASSESSMENT AND PLAN    Active Problems  #TME: PoA. Suspect related to infectious etiology on admission given lack of other likely etiologies. Improved, patient A&O on assessment, though intermittently confused. ?hospital delirium contributing at this point. Will continue to monitor, delirium precautions  #COVID: PoA. Mild patchy airspace disease noted on CXR. No noted hypoxia, not currently on steroids/ivana. Will monitor  #Orthostatic hypotension with supine hypertension: Orthostats+ with a significant systolic decrease on standing with resultant dizziness. Supine patient remains intermittently hypertensive. Continue on hydralazine/lopressor at this time. Patient likely poor midodrine candidate given cardiac hx, will hold off at this time, though will likely need to consider if remains persistently asymptomatic. ECHO ordered to assess current cardiac function given time interval from previous. Repeat orthostats pending, will consider flowmax decrease/discontinuation if fails to improve. ANNALISE hose/abdominal binder ordered. Remains with significant orthostasis, will hold Flowmax  #Urinary retention: Noted, on home Flowmax - held 2/2 above. Will attempt void trial off Flowmax, if remains with urinary retention may require beltran at discharge and follow-up OP for further eval  #Fall: In setting of above, clinically appears as orthostasis likely etiology. PT/OT  #Elevated troponin: Likely demand, trops stable  #Thrombocytopenia: Noted, platelets stable. Suspect in setting of COVID. Will monitor  Resolved Problems  #Rhabdomyolysis: Mild on presentation, s/p IVF with improvement of CK  Chronic Conditions (reviewed and stable unless otherwise stated)  #CKDIIIa: Baseline appears 1.1-1.2. Cr stable, will continue to monitor  #Chronic macrocytic anemia: Noted, no overt

## 2024-12-26 NOTE — PROGRESS NOTES
Spiritual Health History and Assessment/Progress Note  [facility]    Initial Encounter,  ,  ,      Name: Ravi Khan MRN: [id number]    Age: 89 y.o.     Sex: male   Language: English   Evangelical: Latter-day   COVID-19 virus detected     Date: 12/26/2024            Total Time Calculated: (P) 7 min              Spiritual Assessment began in [facility]        Referral/Consult From: Rounding   Encounter Overview/Reason: Initial Encounter  Service Provided For: Patient and family together    Anitra, Belief, Meaning:   Patient identifies as spiritual, is connected with a anitra tradition or spiritual practice, and has beliefs or practices that help with coping during difficult times  Family/Friends identify as spiritual and are connected with a anitra tradition or spiritual practice      Importance and Influence:  Patient has spiritual/personal beliefs that influence decisions regarding their health  Family/Friends have spiritual/personal beliefs that influence decisions regarding the patient's health    Community:  Patient is connected with a spiritual community and feels well-supported. Support system includes: Spouse/Partner, Children, Anitra Community, Friends, and Extended family  Family/Friends are connected with a spiritual community: and feel well-supported. Support system includes: Parent/s and Children    Assessment and Plan of Care:   Ravi is a 89 year old male admitted on 3B to receive medical care for Covid-19 medical problem. Patient is in the room with his wife. They both engaged in conversation with me telling me that patient worked for many years with the [employer] [facility]. Patient and his wife shared with me that they have several adult children who are giving patient and spouse good support. Both patient and his spouse are approachable, hopeful and coping. Patient's wife shared their appreciation for the care they are receiving. During this encounter, I offered  active listening, nurtured hope as well as emotional and spiritual support. Patient and his wife showed gratitude and appreciation for the support this  provided. This  noticed during the encounter that patient has no ACP document in medical record, therefore, this  will return to talk with patient and his family about ACP/Living will document and decision makers.      Patient Interventions include: Facilitated expression of thoughts and feelings  Family/Friends Interventions include: Facilitated expression of thoughts and feelings    Patient Plan of Care: Spiritual Care available upon further referral  Family/Friends Plan of Care: Spiritual Care available upon further referral    Electronically signed by AURORA Reeves on 12/26/2024 at 3:49 PM

## 2024-12-26 NOTE — PLAN OF CARE
Problem: Pain  Goal: Verbalizes/displays adequate comfort level or baseline comfort level  Outcome: Progressing  Flowsheets  Taken 12/26/2024 0929  Verbalizes/displays adequate comfort level or baseline comfort level: Encourage patient to monitor pain and request assistance  Taken 12/26/2024 0812  Verbalizes/displays adequate comfort level or baseline comfort level: Encourage patient to monitor pain and request assistance     Problem: Chronic Conditions and Co-morbidities  Goal: Patient's chronic conditions and co-morbidity symptoms are monitored and maintained or improved  Outcome: Progressing  Flowsheets (Taken 12/26/2024 0820)  Care Plan - Patient's Chronic Conditions and Co-Morbidity Symptoms are Monitored and Maintained or Improved: Monitor and assess patient's chronic conditions and comorbid symptoms for stability, deterioration, or improvement     Problem: Discharge Planning  Goal: Discharge to home or other facility with appropriate resources  Outcome: Progressing  Flowsheets (Taken 12/26/2024 0820)  Discharge to home or other facility with appropriate resources: Identify barriers to discharge with patient and caregiver   Planning possible discharge to [facility] tomorrow.     Problem: Safety - Adult  Goal: Free from fall injury  Outcome: Progressing  Continue fall precautions, up with assist and walker      Problem: ABCDS Injury Assessment  Goal: Absence of physical injury  Outcome: Progressing     Problem: Skin/Tissue Integrity  Goal: Absence of new skin breakdown  Description: 1.  Monitor for areas of redness and/or skin breakdown  2.  Assess vascular access sites hourly  3.  Every 4-6 hours minimum:  Change oxygen saturation probe site  4.  Every 4-6 hours:  If on nasal continuous positive airway pressure, respiratory therapy assess nares and determine need for appliance change or resting period.  Outcome: Progressing     Problem: Respiratory - Adult  Goal: Achieves optimal ventilation and

## 2024-12-26 NOTE — CARE COORDINATION
12/26/24, 9:10 AM EST    DISCHARGE PLANNING EVALUATION    Spoke with Dwayne at [facility]. They can accept tomorrow.  Will talk with patient to ask if he would still like to go to a facility at discharge.      Update 12:16 pm:Spoke with Dwayne from [facility] and they will no longer have that bed tomorrow.      Update 2:36 pm: Spoke with son Noam, patient and patients spouse was also in the room.  Made them aware [facility] will not have a bed.  Discussed OT recommendation for home with home health.  Noam spoke with his father when SW was out of the room and called his brother Arias.  They all would like a referral made to [facility].  Called and made the referral to Charlie at the facility.  She is aware he is a potential discharge for tomorrow.  Noam told GEORGETTE that they will be able to transport at discharge.      Update 3:12 pm: Patient has been accepted to [facility].

## 2024-12-26 NOTE — PALLIATIVE CARE
Initial Evaluation        Patient:   Ravi Khan  YOB: 1935  Age:  89 y.o.  Room:  [facility]26/026-A  MRN:  [id number]   Acct: [id number]    Date of Admission:  12/19/2024  6:46 PM  Date of Service:  12/26/2024  Completed By:  Nedra Daniel RN        Reason for Palliative Care Evaluation:-   Code Status Discussion     Current Concerns   N/A     Palliative Performance Scale   80%   Full ambulation; normal activity with effort; some evidence of disease; able to do own self care; normal or reduced intake; fully conscious     History    CAD, CKD, HTN, HLD, CABG, DM, [place]. Patient is from home where he cares for his wife who has dementia. Ravi had a fall at home and was found by his son after a couple hours. Diagnosed with COVID on admission. AMS on admission now resolved.      Goals of Care Discussions and Plan         Advance Care Planning   Goals of Care/Advance Care Planning (ACP) Conversation    Date of Conversation: 12/26/24    Individuals present for the conversation: Patient     ACP documents on file prior to discussion:  -None    Previously completed document/s not on file: Patient believes he has a DNR at home that he had completed prior to a knee surgery. Patient states his sons are his financial power of , but he is not sure if they are his medical power of .     Healthcare Power of /Healthcare Surrogate Decision Makers:    Noam Khan (son): [phone]  Arias Khan (son): [phone]    Conversation Summary: Patient explains that he is feeling much better and that he was able to work with occupational therapy this morning and walk a far distance while only feeling mildly short of breath. Ravi explains that he plans to go to an ECF at discharge to regain his strength. Ravi states that he is the caregiver for his wife that has dementia, however, his sons are managing her care at this time and will continue to do so when he is discharged to an ECF.

## 2024-12-27 VITALS
WEIGHT: 173.28 LBS | BODY MASS INDEX: 24.81 KG/M2 | OXYGEN SATURATION: 98 % | TEMPERATURE: 97.7 F | RESPIRATION RATE: 16 BRPM | SYSTOLIC BLOOD PRESSURE: 116 MMHG | HEIGHT: 70 IN | DIASTOLIC BLOOD PRESSURE: 72 MMHG | HEART RATE: 93 BPM

## 2024-12-27 LAB
ANION GAP SERPL CALC-SCNC: 9 MEQ/L (ref 8–16)
BUN SERPL-MCNC: 29 MG/DL (ref 7–22)
CALCIUM SERPL-MCNC: 9 MG/DL (ref 8.5–10.5)
CHLORIDE SERPL-SCNC: 106 MEQ/L (ref 98–111)
CO2 SERPL-SCNC: 25 MEQ/L (ref 23–33)
CREAT SERPL-MCNC: 1.2 MG/DL (ref 0.4–1.2)
GFR SERPL CREATININE-BSD FRML MDRD: 58 ML/MIN/1.73M2
GLUCOSE BLD STRIP.AUTO-MCNC: 128 MG/DL (ref 70–108)
GLUCOSE BLD STRIP.AUTO-MCNC: 144 MG/DL (ref 70–108)
GLUCOSE BLD STRIP.AUTO-MCNC: 199 MG/DL (ref 70–108)
GLUCOSE SERPL-MCNC: 152 MG/DL (ref 70–108)
POTASSIUM SERPL-SCNC: 4.6 MEQ/L (ref 3.5–5.2)
SODIUM SERPL-SCNC: 140 MEQ/L (ref 135–145)

## 2024-12-27 PROCEDURE — 6360000002 HC RX W HCPCS

## 2024-12-27 PROCEDURE — 97530 THERAPEUTIC ACTIVITIES: CPT

## 2024-12-27 PROCEDURE — 2500000003 HC RX 250 WO HCPCS

## 2024-12-27 PROCEDURE — 80048 BASIC METABOLIC PNL TOTAL CA: CPT

## 2024-12-27 PROCEDURE — 82948 REAGENT STRIP/BLOOD GLUCOSE: CPT

## 2024-12-27 PROCEDURE — 6370000000 HC RX 637 (ALT 250 FOR IP)

## 2024-12-27 PROCEDURE — 99239 HOSP IP/OBS DSCHRG MGMT >30: CPT

## 2024-12-27 PROCEDURE — 51798 US URINE CAPACITY MEASURE: CPT

## 2024-12-27 PROCEDURE — 36415 COLL VENOUS BLD VENIPUNCTURE: CPT

## 2024-12-27 RX ORDER — 0.9 % SODIUM CHLORIDE 0.9 %
500 INTRAVENOUS SOLUTION INTRAVENOUS ONCE
Status: DISCONTINUED | OUTPATIENT
Start: 2024-12-27 | End: 2024-12-27

## 2024-12-27 RX ADMIN — OXYCODONE HYDROCHLORIDE AND ACETAMINOPHEN 1000 MG: 500 TABLET ORAL at 08:37

## 2024-12-27 RX ADMIN — SODIUM CHLORIDE, PRESERVATIVE FREE 10 ML: 5 INJECTION INTRAVENOUS at 08:34

## 2024-12-27 RX ADMIN — ASPIRIN 81 MG: 81 TABLET, COATED ORAL at 08:37

## 2024-12-27 RX ADMIN — INSULIN LISPRO 1 UNITS: 100 INJECTION, SOLUTION INTRAVENOUS; SUBCUTANEOUS at 13:08

## 2024-12-27 RX ADMIN — DOCUSATE SODIUM 100 MG: 100 CAPSULE, LIQUID FILLED ORAL at 08:37

## 2024-12-27 RX ADMIN — LEVOTHYROXINE SODIUM 25 MCG: 0.03 TABLET ORAL at 08:14

## 2024-12-27 RX ADMIN — ENOXAPARIN SODIUM 40 MG: 100 INJECTION SUBCUTANEOUS at 08:36

## 2024-12-27 NOTE — DISCHARGE INSTR - COC
Continuity of Care Form    Patient Name: Ravi Khan   :  1935  MRN:  [id number]    Admit date:  2024  Discharge date:  24    Code Status Order: DNR-CCA   Advance Directives:   Advance Care Flowsheet Documentation             Admitting Physician:  No admitting provider for patient encounter.  PCP: George Conway MD    Discharging Nurse: Yahaira BUTLER  Discharging Hospital Unit/Room#: 3B-26/026-A  Discharging Unit Phone Number: [phone]    Emergency Contact:   Extended Emergency Contact Information  Primary Emergency Contact: Fernanda Khan  Address: [address] [place] [id number] [facility]  Home Phone: [phone]  Mobile Phone: [phone]  Relation: Spouse  Secondary Emergency Contact: Arias Khan   [facility]  Home Phone: [phone]  Mobile Phone: [phone]  Relation: Child    Past Surgical History:  Past Surgical History:   Procedure Laterality Date    CARPAL TUNNEL RELEASE   and     left and  right     COLONOSCOPY      dr. decker and due     CORONARY ARTERY BYPASS GRAFT  2005    HERNIA REPAIR      LUMBAR SPINE SURGERY  2019      L4  and  L5   kashmen   at   [facility]     TOTAL KNEE ARTHROPLASTY Left 2019    Dr Marlow @ [facility]        Immunization History:   Immunization History   Administered Date(s) Administered    COVID-19, MODERNA BLUE border, Primary or Immunocompromised, (age 12y+), IM, 100 mcg/0.5mL 2021, 2021, 2022    Influenza 10/10/2012    Influenza Virus Vaccine 2013, 2014, 10/30/2015, 2020    Influenza Whole 10/01/2006    Influenza, AFLURIA (age 3 y+), FLUZONE, (age 6 mo+), Quadv MDV, 0.5mL 2016, 10/17/2019    Influenza, AFLURIA, FLUZONE, (age2 y+), IM, Trivalent MDV, 0.5mL 10/28/2024    Influenza, FLUAD, (age 65 y+), IM, Quadv, 0.5mL 2020    Influenza, FLUARIX, FLULAVAL, FLUZONE (age 6 mo+) and AFLURIA, (age 3 y+), Quadv PF, 0.5mL 2020

## 2024-12-27 NOTE — PALLIATIVE CARE
Follow Up / Progress Note        Patient:   Ravi Khan  YOB: 1935  Age:  89 y.o.  Room:  [address] [place] [address]  MRN:  [id number]           Goals of Care/Advance Care Planning (ACP) Conversation    Date of Conversation: 12/27/24    Individuals present for the conversation: Patient and Legal healthcare agent named below     ACP documents on file prior to discussion:  -None    Previously completed document/s not on file:   Patient's son, Noam, brought in Living Will and Healthcare Power of  documents.     Healthcare Power of /Healthcare Surrogate Decision Makers:    Fernanda Bill (wife)- POA (unable to make decisions for patient)  Arias Khan (son)- 1st alternate agent  Noam Khan (son)- 2nd alternate agent    Conversation Summary: Patient present in room along with wife and son, Noam. Obtained Living Will and HCPOA documents from son.    Had conversation regarding code status now that son is present. Ravi states that he does not want any \"extraordinary measures.\" This RN explained DNR-CCA in detail to patient and family. Explained resuscitative measures including chest compressions, shock, intubation, and medications. Also explained risk of rib fractures and brain and organ damage. Patient explains that he would not want any of these measures carried out. Patient states that he is agreeable to intubation for respiratory failure if it is for a short period. Patient and family all on board to change code status to a DNR-CCA. Document signed by attending, original given to patient, all documents faxed to medical records, and order obtained for code status change. No further needs from patient and family at this time.      Treatment Limitations: They would not want cardiopulmonary resuscitation in the event of cardiac arrest. They would want to attempt to sustain life by all other medically effective means. This includes providing appropriate medical and

## 2024-12-27 NOTE — DISCHARGE INSTRUCTIONS
Leaving the Hospital After Treatment for COVID-19: Care Instructions  Overview     You are being sent home from the hospital after being treated for COVID-19. Being in the hospital can be hard, especially if you've been in the intensive care unit (ICU).  Even though you're going home, you probably don't feel well yet. Healing from COVID-19 takes time. You may feel very tired for weeks or months afterward, especially if you were on a ventilator. It will take time to get back to your old level of activity. Some people may have long-lasting health problems. But most people can look forward to feeling a little better every day.  If you were on a ventilator, your throat may be sore and your voice hoarse or raspy for a while.  After leaving the hospital, some people have feelings of anxiety and depression. They may have nightmares. Or in their mind they may relive events that happened in the hospital (flashbacks). Reach out to your doctor if you're having trouble with these symptoms.  Your doctor will tell you if you need to isolate yourself at home, and when you can end isolation.  How can you self-isolate when you have COVID-19?  If you have COVID-19, there are things you can do to help avoid spreading the virus to others.  Stay home, and avoid contact with other people.  Limit contact with people in your home. If possible, stay in a separate bedroom and use a separate bathroom.  Wear a high-quality mask when you are around other people.  Improve airflow. If you have to spend time indoors with others, open windows and doors. Or you can use a fan to blow air away from people and out a window.  Avoid contact with pets and other animals.  Cover your mouth and nose with a tissue when you cough or sneeze. Then throw it in the trash right away.  Wash your hands often, especially after you cough or sneeze. Use soap and water, and scrub for at least 20 seconds. If soap and water aren't available, use an alcohol-based hand  by [facility]. If you have questions about a medical condition or this instruction, always ask your healthcare professional. [employer] disclaims any warranty or liability for your use of this information.

## 2024-12-27 NOTE — DISCHARGE SUMMARY
Hospital Medicine Discharge Summary      Patient Identification:   Ravi Khan   : 1935  MRN: [id number]   Account: [id number]   Patient's PCP: George Conway MD    Admit Date: 2024   Discharge Date:   24    Admitting Physician: No admitting provider for patient encounter.  Discharge Physician: Renny Lazo MD       Discharge Diagnoses:    #Orthostatic hypotension with supine hypertension: Significant orthostasis during admission, discussed with family day of discharge and patient appears at baseline. Patient likely poor midodrine candidate given cardiac hx and significant supine hypertension. ECHO unremarkable. Flowmax discontinued. ANNALISE hose/abdominal binder ordered. Given patient at baseline after discussion, ok for discharge and follow-up for further evaluation and management. Extensive discussion with patient regarding conservative measures, including slow gradual movements, waiting before beginning ambulation, ANNALISE hose/abdominal binder usage  #Urinary retention: Noted, on home Flowmax - held 2/2 above. Attempted void trial off Flowmax given orthostasis, patient required straight cath and was noted to have progressive urinary retention. Beltran placed at discharge, referral to OP urology for evaluation. [facility] beltran [facility] at [facility]  #TME: PoA. Resolved  #COVID: PoA. Mild patchy airspace disease noted on CXR. No noted hypoxia, not currently on steroids/ivana. Respiratory status stable  #Fall: In setting of above, clinically appears as orthostasis likely etiology  #Elevated troponin: Likely demand, trops stable  #Thrombocytopenia: Noted, platelets stable. Suspect in setting of COVID  #Rhabdomyolysis: Resolved. Mild on presentation, s/p IVF with improvement of CK  Chronic Conditions (reviewed and stable unless otherwise stated)  #CKDIIIa  #Chronic macrocytic anemia  #NIDDMII  #Deconditioning  #HFpEF  #Hx CAD    Hospital Course:   Ravi Khan is a 89 y.o. male with PMHx DM,

## 2024-12-27 NOTE — PROGRESS NOTES
Discharge teaching and instructions for diagnosis/procedure of Covid, altered mental status, falls, orthostatic hypotension completed with patient and family using teachback method. AVS reviewed. AVS faxed to SNF. Patient and son voiced understanding regarding medications, follow up appointments, and care of self at home. Discharged in a wheelchair to  skilled nursing per family. Patient stable at discharge. Discharged with Klein catheter for urine retention.

## 2024-12-27 NOTE — PROGRESS NOTES
[facility]  STRZ CCU-STEPDOWN 3B  Occupational Therapy  Daily Note    Discharge Recommendations: ECF with OT  Equipment Recommendations: No defer to next level of care      Time In: 921  Time Out: 944  Timed Code Treatment Minutes: 23 Minutes  Minutes: 23          Date: 2024  Patient Name: Ravi Khan,   Gender: male      Room: [address] [place] [address]  MRN: [id number]  : 1935  (89 y.o.)  Referring Practitioner: Walter Gudino APRN - CNP  Diagnosis: COVID-19 virus detected  Additional Pertinent Hx: Ravi Khan is a 89 y.o. male with PMHx of DMII, CAD, HLD, CKD, HTN who presents to [facility] with fall.  It is reported that the patient was found down on the ground by his son today after having an unwitnessed fall. Patient is non-contributory to HPI due to AMS. Son states that patient is normally independent for ADLs, but now patient is somnolent and not able to do any of the things he normally does. He also states that the patient has appeared to be ill with an upper respiratory infection lately, but is unsure as to how long symptoms have been present. Patient was complaining of back pain, but was unable to verbalize how bad pain was to this provider.    Restrictions/Precautions:  Restrictions/Precautions: General Precautions, Fall Risk, Contact Precautions (covid-19)     Social/Functional History:  Lives With: Spouse  Type of Home: House  Home Layout: One level  Home Access: Stairs to enter with rails  Entrance Stairs - Number of Steps: 5  Entrance Stairs - Rails: Left  Home Equipment: Walker - Rolling, Cane   Bathroom Shower/Tub: Walk-in shower, Shower chair without back  Bathroom Toilet: Standard       Prior Level of Assist for ADLs: Independent  Prior Level of Assist for Homemaking: Independent  Prior Level of Assist for Transfers: Independent  Prior Level of Assist for Ambulation: Independent household ambulator, with or without device  Has the patient had two or more  Term Goal 4: Patient will tolerate standing 4-6 minutes with unilateral UE release reaching ouside base of support with SBA to complete sink side grooming.  Long Term Goals  Time Frame for Long Term Goals : None due to ELOS    Following session, patient left in safe position with all fall risk precautions in place.

## 2024-12-27 NOTE — PALLIATIVE CARE
Follow Up / Progress Note        Patient:   Ravi Khan  YOB: 1935  Age:  89 y.o.  Room:  [address] [place] [address]  MRN:  [id number]         Family/Patient Discussion:  Attempted to call patient's son, Noam, to see if patient has any ACP documents on file at home. No answer at this time. Unable to leave voicemail.       Plan/Follow-Up:  Will try again later as time permits. Patient is potentially discharging this afternoon.          Electronically signed by Nedra Daniel RN on 12/27/2024 at 9:35 AM             Palliative Care Office: [phone]

## 2024-12-27 NOTE — CARE COORDINATION
12/27/24, 10:36 AM EST    DISCHARGE PLANNING EVALUATION    Spoke with Charlie at [facility].  She is aware patient is a potential discharge for today.  Will let her know this afternoon if he is being discharged.      12/27/24, 1:58 PM EST    Patient goals/plan/ treatment preferences discussed by  and .  Patient goals/plan/ treatment preferences reviewed with patient/ family.  Patient/ family verbalize understanding of discharge plan and are in agreement with goal/plan/treatment preferences.  Understanding was demonstrated using the teach back method.  AVS provided by RN at time of discharge, which includes all necessary medical information pertaining to the patients current course of illness, treatment, post-discharge goals of care, and treatment preferences.     Services At/After Discharge: Skilled Nursing Facility (SNF), Aide services, Nursing service, OT, and PT       Ravi will be discharged to [facility] today.  He will be skilled under his Medicare benefit.  He will be transported by family.  Discharge instructions are attached to blue discharge packet.  Spoke with Charlie at the facility and she is agreeable to accept.  Patient and family are also agreeable to plan.

## 2024-12-27 NOTE — CARE COORDINATION
12/27/24, 9:21 AM EST    DISCHARGE ON GOING EVALUATION    Ravi MORSE [facility] day: 8  Location: [facility] Reason for admit: Disorientation [R41.0]  Fall from standing, initial encounter [W19.XXXA]  COVID-19 virus detected [U07.1]  COVID-19 [U07.1]     Procedures: none    Imaging since last note:   12/24 CXR: Borderline heart size. Mildly tortuous and ectatic descending thoracic aorta. Metallic sternotomy sutures and vascular clips from prior surgery. 2. No acute infiltrates or effusions are seen. 3. Moderate degenerative changes both shoulders and cephalad migration of both humeral heads, consistent with rotator cuff tear/degeneration.    Barriers to Discharge: Hospitalist following. PT/OT. Attempt void trial yesterday. Patient needed straight cathed over HS, may need beltran if he doesn't void today. Orthostatics (+). Patient asymptomatic. Add TEDS and Abdominal binder today. Potential discharge this afternoon.     PCP: George Conway MD  Readmission Risk Score: 16.1    Patient Goals/Plan/Treatment Preferences: From home w/ wife. [facility]. No precert. SW following.

## 2024-12-27 NOTE — FLOWSHEET NOTE
Klein catheter inserted for discharge to SNF per order Dr. Lazo due to urine retention. 425ml urine returned with Klein insertion, patient unable to urinate/retention prior to insertion with feelings of bladder fullness. Patient to follow up outpatient with Urology.

## 2024-12-27 NOTE — PLAN OF CARE
Problem: Pain  Goal: Verbalizes/displays adequate comfort level or baseline comfort level  Outcome: Progressing  Flowsheets (Taken 12/27/2024 0810)  Verbalizes/displays adequate comfort level or baseline comfort level: Encourage patient to monitor pain and request assistance     Problem: Chronic Conditions and Co-morbidities  Goal: Patient's chronic conditions and co-morbidity symptoms are monitored and maintained or improved  Outcome: Progressing  Flowsheets (Taken 12/27/2024 0815)  Care Plan - Patient's Chronic Conditions and Co-Morbidity Symptoms are Monitored and Maintained or Improved: Monitor and assess patient's chronic conditions and comorbid symptoms for stability, deterioration, or improvement     Problem: Discharge Planning  Goal: Discharge to home or other facility with appropriate resources  Outcome: Progressing  Flowsheets (Taken 12/27/2024 0815)  Discharge to home or other facility with appropriate resources: Identify barriers to discharge with patient and caregiver     Problem: Safety - Adult  Goal: Free from fall injury  Outcome: Progressing     Problem: ABCDS Injury Assessment  Goal: Absence of physical injury  Outcome: Progressing     Problem: Skin/Tissue Integrity  Goal: Absence of new skin breakdown  Description: 1.  Monitor for areas of redness and/or skin breakdown  2.  Assess vascular access sites hourly  3.  Every 4-6 hours minimum:  Change oxygen saturation probe site  4.  Every 4-6 hours:  If on nasal continuous positive airway pressure, respiratory therapy assess nares and determine need for appliance change or resting period.  Outcome: Progressing     Problem: Respiratory - Adult  Goal: Achieves optimal ventilation and oxygenation  Outcome: Progressing     Problem: Cardiovascular - Adult  Goal: Maintains optimal cardiac output and hemodynamic stability  Outcome: Progressing  Flowsheets (Taken 12/27/2024 0815)  Maintains optimal cardiac output and hemodynamic stability: Monitor blood

## 2024-12-30 ENCOUNTER — CARE COORDINATION (OUTPATIENT)
Dept: CARE COORDINATION | Age: 89
End: 2024-12-30

## 2025-01-10 ENCOUNTER — TELEPHONE (OUTPATIENT)
Dept: UROLOGY | Age: 89
End: 2025-01-10

## 2025-01-10 ENCOUNTER — OFFICE VISIT (OUTPATIENT)
Dept: UROLOGY | Age: 89
End: 2025-01-10
Payer: MEDICARE

## 2025-01-10 VITALS — HEIGHT: 70 IN | BODY MASS INDEX: 23.48 KG/M2 | WEIGHT: 164 LBS | TEMPERATURE: 98 F

## 2025-01-10 DIAGNOSIS — R39.12 BENIGN PROSTATIC HYPERPLASIA WITH WEAK URINARY STREAM: ICD-10-CM

## 2025-01-10 DIAGNOSIS — R33.9 URINARY RETENTION: Primary | ICD-10-CM

## 2025-01-10 DIAGNOSIS — N40.1 BENIGN PROSTATIC HYPERPLASIA WITH WEAK URINARY STREAM: ICD-10-CM

## 2025-01-10 PROCEDURE — 1036F TOBACCO NON-USER: CPT

## 2025-01-10 PROCEDURE — 1111F DSCHRG MED/CURRENT MED MERGE: CPT

## 2025-01-10 PROCEDURE — G8427 DOCREV CUR MEDS BY ELIG CLIN: HCPCS

## 2025-01-10 PROCEDURE — 1124F ACP DISCUSS-NO DSCNMKR DOCD: CPT

## 2025-01-10 PROCEDURE — G8420 CALC BMI NORM PARAMETERS: HCPCS

## 2025-01-10 PROCEDURE — 99203 OFFICE O/P NEW LOW 30 MIN: CPT

## 2025-01-10 PROCEDURE — 1159F MED LIST DOCD IN RCRD: CPT

## 2025-01-10 RX ORDER — TAMSULOSIN HYDROCHLORIDE 0.4 MG/1
0.4 CAPSULE ORAL DAILY
Qty: 90 CAPSULE | Refills: 1 | Status: SHIPPED | OUTPATIENT
Start: 2025-01-10

## 2025-01-10 NOTE — PROGRESS NOTES
[facility] [place] [facility]  [facility] UROLOGY  [address]  [address]  [facility] [address]  Dept: [phone]  Loc: [phone]  Visit Date: 1/10/2025      HPI  Ravi Khan is a 89 y.o. male that presents to the urology clinic as a new patient for the evaluation of urinary retention.    Hospital follow-up. Noted to be in retention while IP at which time beltran catheter was placed. Failed VT at discharge. Presenting for beltran in place today.    Concerns for hypotension with Flomax in the past. Now off Metoprolol.    [facility]    Most Recent PSA: 0.47 (05/06/21)      Last BUN and Creatinine:  Lab Results   Component Value Date    BUN 29 (H) 12/27/2024     Lab Results   Component Value Date    CREATININE 1.2 12/27/2024         PAST MEDICAL, FAMILY AND SOCIAL HISTORY:  Past Medical History:   Diagnosis Date    CAD (coronary artery disease)     Colon polyp     Hyperlipidemia     Hypertension     S/P CABG (coronary artery bypass graft)     Type II or unspecified type diabetes mellitus without mention of complication, not stated as uncontrolled      Past Surgical History:   Procedure Laterality Date    CARPAL TUNNEL RELEASE  1995 and 1995    left and  right     COLONOSCOPY  2009    dr. decker and due 2014    CORONARY ARTERY BYPASS GRAFT  2005    HERNIA REPAIR      LUMBAR SPINE SURGERY  11/2019      L4  and  L5   kashmen   at   [facility]     TOTAL KNEE ARTHROPLASTY Left 06/03/2019    Dr Marlow @ [facility]      Family History   Problem Relation Age of Onset    Heart Disease Father     Heart Disease Mother      Outpatient Medications Marked as Taking for the 1/10/25 encounter (Office Visit) with Yvan Friend PA-C   Medication Sig Dispense Refill    tamsulosin (FLOMAX) 0.4 MG capsule Take 1 capsule by mouth daily 90 capsule 1    metFORMIN (GLUCOPHAGE) 500 MG tablet TAKE 1 TABLET EVERY MORNINGAND TAKE 2 TABLETS EVERY   EVENING 270 tablet 1    levothyroxine (SYNTHROID) 25 MCG tablet

## 2025-01-10 NOTE — TELEPHONE ENCOUNTER
Orders sent in packet to [facility] stating Start Flomax 0.4 mg once daily. If unable to urinate within 6 hours, replace the beltarn.  Mepilex dressing for sacrum.     Per check out note

## 2025-01-10 NOTE — PATIENT INSTRUCTIONS
Start Flomax 0.4 mg once daily.    If unable to urinate within 6 hours, replace the beltran.    Mepilex dressing for sacrum.

## 2025-01-10 NOTE — PROGRESS NOTES
Patient has given me verbal consent to perform beltran removal  Yes    DIAGNOSIS/INDICATION:  urinary retention     Per Yvan MARTIN 10 cc of water deflated from beltran balloon. 14 Fr coude beltran removed without difficulty.    Foreskin reduced back down?  Yes      Pt will drink fluids and report to ER in 6-8 hours if patient unable to urinate.      F/u with provider as scheduled.

## 2025-01-24 ENCOUNTER — OFFICE VISIT (OUTPATIENT)
Dept: UROLOGY | Age: 89
End: 2025-01-24

## 2025-01-24 ENCOUNTER — TELEPHONE (OUTPATIENT)
Dept: FAMILY MEDICINE CLINIC | Age: 89
End: 2025-01-24

## 2025-01-24 VITALS — HEIGHT: 70 IN | BODY MASS INDEX: 23.48 KG/M2 | RESPIRATION RATE: 14 BRPM | WEIGHT: 164 LBS

## 2025-01-24 DIAGNOSIS — N40.1 BENIGN PROSTATIC HYPERPLASIA WITH WEAK URINARY STREAM: Primary | ICD-10-CM

## 2025-01-24 DIAGNOSIS — R39.12 BENIGN PROSTATIC HYPERPLASIA WITH WEAK URINARY STREAM: Primary | ICD-10-CM
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
## 2025-01-24 LAB
BILIRUBIN, URINE: NEGATIVE
BLOOD URINE, POC: NEGATIVE
CHARACTER, URINE: CLEAR
COLOR, UA: YELLOW
GLUCOSE URINE: NEGATIVE MG/DL
KETONES, URINE: NEGATIVE
LEUKOCYTE CLUMPS, URINE: ABNORMAL
NITRITE, URINE: NEGATIVE
PH, URINE: 5.5 (ref 5–9)
POST VOID RESIDUAL (PVR): 11 ML
PROTEIN, URINE: NEGATIVE MG/DL
SPECIFIC GRAVITY UA: 1.01 (ref 1–1.03)
UROBILINOGEN, URINE: 0.2 EU/DL (ref 0–1)

## 2025-01-24 ASSESSMENT — ENCOUNTER SYMPTOMS: ABDOMINAL PAIN: 0

## 2025-01-24 NOTE — PATIENT INSTRUCTIONS
Please call the office at [phone] if you have any questions or concerns following your visit. If you were prescribed a new medication and have questions, feel free to call. For any emergent issues, please go to the nearest emergency room for further evaluation.

## 2025-01-24 NOTE — PROGRESS NOTES
[facility] [place] [facility]  [facility] UROLOGY  [address]  [address]  [facility] [address]  Dept: [phone]  Loc: [phone]    Visit Date: 1/24/2025    History of Present Illness  Ravi Khan is a 89 y.o. male,Established patient, here for evaluation of the following chief complaint(s):  Follow-up (Urinary retention, pt doesn't have a catheter)    Ravi initially presented 1/10/2025 for hospital follow up in which catheter was placed. Was admitted for orthostatic hypotension s/p fall and Flomax was discontinued. He failed VT at discharge and was referred to us after discharge. Flomax was restarted at last visit and catheter was removed. Here today to evaluate PVR and symptom check. PVR 11. Denies symptoms. Up 1 time a night.    Current Outpatient Medications   Medication Sig Dispense Refill    tamsulosin (FLOMAX) 0.4 MG capsule Take 1 capsule by mouth daily 90 capsule 1    metFORMIN (GLUCOPHAGE) 500 MG tablet TAKE 1 TABLET EVERY MORNINGAND TAKE 2 TABLETS EVERY   EVENING 270 tablet 1    levothyroxine (SYNTHROID) 25 MCG tablet TAKE 1 TABLET DAILY 90 tablet 1    metoprolol tartrate (LOPRESSOR) 25 MG tablet TAKE 1/2 TABLET TWICE A DAY 90 tablet 1    glimepiride (AMARYL) 4 MG tablet TAKE 1/2 TABLET TWICE A DAY 90 tablet 1    polyethylene glycol (MIRALAX) 17 GM/SCOOP powder Take 17 g by mouth daily 510 g 2    Famotidine (PEPCID PO) Take 20 mg by mouth as needed (acid reflux)      acetaminophen (TYLENOL) 500 MG tablet Take 1 tablet by mouth every 6 hours as needed for Pain      nitroGLYCERIN (NITROSTAT) 0.4 MG SL tablet Place 1 tablet under the tongue every 5 minutes as needed for Chest pain 25 tablet 5    Ascorbic Acid (VITAMIN C) 1000 MG tablet Take 1 tablet by mouth daily      aspirin EC 81 MG EC tablet Take 1 tablet by mouth daily      simvastatin (ZOCOR) 10 MG tablet TAKE 1 TABLET NIGHTLY (Patient not taking: Reported on 1/10/2025) 90 tablet 1    blood glucose test strips

## 2025-01-24 NOTE — TELEPHONE ENCOUNTER
Jacobo (son) stopped in with [facility] [employer] papers for his job, needing them for work since he's the one taking care of his Dad.    Scanned and put on chart, the Steve

## 2025-01-30 ENCOUNTER — TELEPHONE (OUTPATIENT)
Dept: FAMILY MEDICINE CLINIC | Age: 89
End: 2025-01-30

## 2025-01-30 DIAGNOSIS — M17.0 PRIMARY OSTEOARTHRITIS OF BOTH KNEES: Primary | ICD-10-CM

## 2025-01-30 NOTE — TELEPHONE ENCOUNTER
Arias called stating that he spoke with Dr. Conway yesterday at [facility] about talking to Dr. Marlow in regards to patients knee prosthesis.    Arias may be reached at [phone]

## 2025-02-01 DIAGNOSIS — E11.8 TYPE 2 DIABETES MELLITUS WITH COMPLICATION, WITHOUT LONG-TERM CURRENT USE OF INSULIN (HCC): ICD-10-CM

## 2025-02-01 DIAGNOSIS — E78.00 PURE HYPERCHOLESTEROLEMIA: ICD-10-CM

## 2025-02-03 ENCOUNTER — CARE COORDINATION (OUTPATIENT)
Dept: CASE MANAGEMENT | Age: 89
End: 2025-02-03

## 2025-02-03 DIAGNOSIS — U07.1 COVID-19 VIRUS DETECTED: Primary | ICD-10-CM

## 2025-02-03 RX ORDER — GLIMEPIRIDE 4 MG/1
TABLET ORAL
Qty: 90 TABLET | Refills: 1 | Status: SHIPPED | OUTPATIENT
Start: 2025-02-03 | End: 2025-02-07

## 2025-02-03 RX ORDER — SIMVASTATIN 10 MG
TABLET ORAL
Qty: 90 TABLET | Refills: 1 | Status: SHIPPED | OUTPATIENT
Start: 2025-02-03

## 2025-02-03 NOTE — TELEPHONE ENCOUNTER
Date of last visit:  10/28/2024  Date of next visit:  2/7/2025    Requested Prescriptions     Pending Prescriptions Disp Refills    glimepiride (AMARYL) 4 MG tablet [Pharmacy Med Name: GLIMEPIRIDE  TAB 4MG] 90 tablet 1     Sig: TAKE 1/2 TABLET TWICE A DAY    simvastatin (ZOCOR) 10 MG tablet [Pharmacy Med Name: SIMVASTATIN  TAB 10MG] 90 tablet 1     Sig: TAKE 1 TABLET NIGHTLY

## 2025-02-03 NOTE — CARE COORDINATION
Care Transitions Note    Initial Call - Call within 2 business days of discharge: Yes    Patient Current Location:  Home: [address] [place] [address]    Care Transition Nurse contacted the family, son/Noam  by telephone to perform post hospital discharge assessment, verified name and  as identifiers. Provided introduction to self, and explanation of the Care Transition Nurse role.     Patient: Ravi Khan    Patient : 1935   MRN: <[id number]>    Reason for Admission: Covid  Discharge Date: 24  RURS: Readmission Risk Score: 15.6      Last Discharge Facility       Date Complaint Diagnosis Description Type Department Provider    24 Fatigue; Altered Mental Status COVID-19 virus detected ... ED to Hosp-Admission (Discharged) (ADMITTED) Renny Spears MD; Carolyn Enamorado ...            Was this an external facility discharge? Yes. Discharge Date: 25. Facility Name: [facility]    Additional needs identified to be addressed with provider   No needs identified             Method of communication with provider: none.    Patients top risk factors for readmission: medical condition-Covid, falls, Hypotension    Interventions to address risk factors:   Review of patient management of conditions/medications: with mikel Santacruz    Care Summary Note: Spoke with mikel Santacruz. He states his dad is doing pretty well. Denies sob, cough, congestion, n/v. States his dad had a couple of falls in the snf. No falls since discharged. Appetite good. Patient is drinking water. Elimination regular. Medications reviewed. Buspar 10mg twice daily, Rosuvastatin 5mg bedtime, Hydroxyzine 25mg 6hr prn, Liptor 20mg 1 bedtime. Patient ambulates using a walker. He does have a wheelchair. He requires minimal assistance for ADL's. Noam states he will attempt to get patient in the shower today. He primarily sponge baths. [facility] nurse was out to the home 25. They are expecting PT to evaluate soon. Noam commutes for

## 2025-02-07 ENCOUNTER — OFFICE VISIT (OUTPATIENT)
Dept: FAMILY MEDICINE CLINIC | Age: 89
End: 2025-02-07

## 2025-02-07 VITALS
DIASTOLIC BLOOD PRESSURE: 64 MMHG | HEART RATE: 92 BPM | WEIGHT: 170 LBS | BODY MASS INDEX: 24.34 KG/M2 | SYSTOLIC BLOOD PRESSURE: 106 MMHG | HEIGHT: 70 IN | RESPIRATION RATE: 18 BRPM

## 2025-02-07 DIAGNOSIS — E11.8 TYPE 2 DIABETES MELLITUS WITH COMPLICATION, WITHOUT LONG-TERM CURRENT USE OF INSULIN (HCC): Primary | ICD-10-CM

## 2025-02-07 DIAGNOSIS — I10 PRIMARY HYPERTENSION: ICD-10-CM

## 2025-02-07 DIAGNOSIS — I95.1 ORTHOSTATIC HYPOTENSION: ICD-10-CM

## 2025-02-07 DIAGNOSIS — N18.31 CHRONIC KIDNEY DISEASE, STAGE 3A (HCC): ICD-10-CM

## 2025-02-07 DIAGNOSIS — I25.10 CORONARY ARTERY DISEASE INVOLVING NATIVE CORONARY ARTERY OF NATIVE HEART WITHOUT ANGINA PECTORIS: ICD-10-CM

## 2025-02-07 DIAGNOSIS — M48.062 LUMBAR STENOSIS WITH NEUROGENIC CLAUDICATION: ICD-10-CM

## 2025-02-07 LAB
CHP ED QC CHECK: ABNORMAL
GLUCOSE BLD-MCNC: 126 MG/DL
HBA1C MFR BLD: 6.6 %

## 2025-02-07 RX ORDER — GLIMEPIRIDE 4 MG/1
TABLET ORAL
Qty: 90 TABLET | Refills: 1
Start: 2025-02-07

## 2025-02-07 ASSESSMENT — ENCOUNTER SYMPTOMS
SHORTNESS OF BREATH: 0
BACK PAIN: 0
CHEST TIGHTNESS: 0
EYE PAIN: 0
CONSTIPATION: 0
SORE THROAT: 0
BLOOD IN STOOL: 0
TROUBLE SWALLOWING: 0
ABDOMINAL PAIN: 0
NAUSEA: 0
COUGH: 0

## 2025-02-07 ASSESSMENT — PATIENT HEALTH QUESTIONNAIRE - PHQ9
1. LITTLE INTEREST OR PLEASURE IN DOING THINGS: NOT AT ALL
SUM OF ALL RESPONSES TO PHQ QUESTIONS 1-9: 0
2. FEELING DOWN, DEPRESSED OR HOPELESS: NOT AT ALL
SUM OF ALL RESPONSES TO PHQ QUESTIONS 1-9: 0
SUM OF ALL RESPONSES TO PHQ9 QUESTIONS 1 & 2: 0

## 2025-02-07 NOTE — PROGRESS NOTES
Subjective   Patient ID: Ravi Khan is a 89 y.o. male.         Walking   is  fair        Ashd  stable       Lumbar  stenosis     Noted         Thyroid  stable        Hypertension  This is a chronic problem. The current episode started more than 1 year ago. The problem has been resolved since onset. The problem is controlled. Pertinent negatives include no chest pain, headaches, palpitations or shortness of breath. The current treatment provides significant improvement. There are no compliance problems.    Diabetes  He presents for his follow-up diabetic visit. He has type 2 diabetes mellitus. His disease course has been stable. There are no hypoglycemic associated symptoms. Pertinent negatives for hypoglycemia include no dizziness or headaches. Pertinent negatives for diabetes include no chest pain, no fatigue and no weakness. There are no hypoglycemic complications. Symptoms are stable. Current diabetic treatment includes oral agent (monotherapy) and oral agent (dual therapy). He is compliant with treatment all of the time. His breakfast blood glucose is taken between 7-8 am. His breakfast blood glucose range is generally 110-130 mg/dl.   Hyperlipidemia  This is a chronic problem. The current episode started more than 1 year ago. The problem is resistant. Recent lipid tests were reviewed and are normal. Associated symptoms include myalgias. Pertinent negatives include no chest pain, focal weakness, leg pain or shortness of breath. Current antihyperlipidemic treatment includes statins. The current treatment provides significant improvement of lipids. There are no compliance problems.        Past Medical History:   Diagnosis Date    CAD (coronary artery disease)     Colon polyp     Hyperlipidemia     Hypertension     S/P CABG (coronary artery bypass graft)     Type II or unspecified type diabetes mellitus without mention of complication, not stated as uncontrolled      Past Surgical History:   Procedure

## 2025-02-10 NOTE — TELEPHONE ENCOUNTER
Forms completed and faxed.    Noam informed. He stated that he would stop in to  forms and pay the charge. Charge entered.

## 2025-02-18 ENCOUNTER — TELEPHONE (OUTPATIENT)
Dept: FAMILY MEDICINE CLINIC | Age: 89
End: 2025-02-18

## 2025-02-18 NOTE — TELEPHONE ENCOUNTER
Pt's son Arias Khan called stating his father is having severe diarrhea and he has been using pepto, they have noticed it's after pt eat's hamberger they are asking what they can do for pt.    Please call once addressed

## 2025-02-18 NOTE — TELEPHONE ENCOUNTER
Per verbal order from Dr Conway: take Imodium OTC to help with Diarrhea, stay well hydrated.     MOM to return call to office (Supriya

## 2025-02-19 ENCOUNTER — TELEPHONE (OUTPATIENT)
Dept: FAMILY MEDICINE CLINIC | Age: 89
End: 2025-02-19

## 2025-02-19 NOTE — TELEPHONE ENCOUNTER
Hawa from [facility] called stating when she got to patient's house patient was stating he was dizzy.Hawa took patient's blood pressures sitting down and it was 78/50 she had patient stand and it was 72/48.Hawa said patient is not having any other symptoms.She stated patient is taking  metoprolol 25 mg half tablet daily.She stated she does not feel patient should go to the emergency room.Hawa is asking what she should do for patient.    Please call Hawa at [phone]

## 2025-03-05 ENCOUNTER — TELEPHONE (OUTPATIENT)
Dept: FAMILY MEDICINE CLINIC | Age: 89
End: 2025-03-05

## 2025-03-05 DIAGNOSIS — M48.062 LUMBAR STENOSIS WITH NEUROGENIC CLAUDICATION: Primary | ICD-10-CM

## 2025-03-05 DIAGNOSIS — R26.81 GAIT INSTABILITY: ICD-10-CM

## 2025-03-05 NOTE — TELEPHONE ENCOUNTER
Kuldip from [facility] called stating they are discharging him today.  Son is asking for a PT referral to [facility] for him to continue therapy there.

## 2025-03-07 ENCOUNTER — TELEPHONE (OUTPATIENT)
Dept: FAMILY MEDICINE CLINIC | Age: 89
End: 2025-03-07

## 2025-03-07 NOTE — TELEPHONE ENCOUNTER
Pt's son, Arias called requesting a Rx for Buspar 10 mg one tab 2 times daily.  Said he was given this when he was at the nursing home and it helped with his anxiety.  Son said that pt is doing better with his anxiety so he has only been giving him the Buspar 10 mg in the evening.  He is asking for a 90 day Buspar 10 mg one in the evening.    [facility] [address]

## 2025-03-07 NOTE — TELEPHONE ENCOUNTER
Physial therapy at  [place]    Please fax to [place]  attn physical therapy     Call son as order sent

## 2025-03-08 RX ORDER — BUSPIRONE HYDROCHLORIDE 10 MG/1
TABLET ORAL
Qty: 90 TABLET | Refills: 1 | Status: SHIPPED | OUTPATIENT
Start: 2025-03-08

## 2025-04-10 DIAGNOSIS — E11.9 TYPE 2 DIABETES MELLITUS WITHOUT COMPLICATION, WITHOUT LONG-TERM CURRENT USE OF INSULIN: ICD-10-CM

## 2025-04-10 DIAGNOSIS — E03.4 HYPOTHYROIDISM DUE TO ACQUIRED ATROPHY OF THYROID: ICD-10-CM

## 2025-04-10 RX ORDER — LEVOTHYROXINE SODIUM 25 UG/1
25 TABLET ORAL DAILY
Qty: 90 TABLET | Refills: 1 | Status: SHIPPED | OUTPATIENT
Start: 2025-04-10

## 2025-04-10 NOTE — TELEPHONE ENCOUNTER
Date of last visit:  2/7/2025  Date of next visit:  5/9/2025    Requested Prescriptions     Pending Prescriptions Disp Refills    levothyroxine (SYNTHROID) 25 MCG tablet [Pharmacy Med Name: LEVOTHYROXIN TAB 25MCG] 90 tablet 1     Sig: TAKE 1 TABLET DAILY    metFORMIN (GLUCOPHAGE) 500 MG tablet [Pharmacy Med Name: METFORMIN TAB 500MG] 270 tablet 1     Sig: TAKE 1 TABLET EVERY MORNINGAND TAKE 2 TABLETS EVERY   EVENING

## 2025-04-11 ENCOUNTER — OFFICE VISIT (OUTPATIENT)
Dept: CARDIOLOGY CLINIC | Age: 89
End: 2025-04-11
Payer: MEDICARE

## 2025-04-11 VITALS
SYSTOLIC BLOOD PRESSURE: 138 MMHG | DIASTOLIC BLOOD PRESSURE: 82 MMHG | HEART RATE: 74 BPM | WEIGHT: 165 LBS | BODY MASS INDEX: 23.62 KG/M2 | HEIGHT: 70 IN

## 2025-04-11 DIAGNOSIS — I25.810 CORONARY ARTERY DISEASE INVOLVING CORONARY BYPASS GRAFT OF NATIVE HEART WITHOUT ANGINA PECTORIS: Primary | ICD-10-CM

## 2025-04-11 DIAGNOSIS — E78.01 FAMILIAL HYPERCHOLESTEROLEMIA: ICD-10-CM

## 2025-04-11 PROCEDURE — 1124F ACP DISCUSS-NO DSCNMKR DOCD: CPT | Performed by: NUCLEAR MEDICINE

## 2025-04-11 PROCEDURE — 99213 OFFICE O/P EST LOW 20 MIN: CPT | Performed by: NUCLEAR MEDICINE

## 2025-04-11 PROCEDURE — 1036F TOBACCO NON-USER: CPT | Performed by: NUCLEAR MEDICINE

## 2025-04-11 PROCEDURE — G8420 CALC BMI NORM PARAMETERS: HCPCS | Performed by: NUCLEAR MEDICINE

## 2025-04-11 PROCEDURE — 1159F MED LIST DOCD IN RCRD: CPT | Performed by: NUCLEAR MEDICINE

## 2025-04-11 PROCEDURE — G8427 DOCREV CUR MEDS BY ELIG CLIN: HCPCS | Performed by: NUCLEAR MEDICINE

## 2025-04-11 NOTE — PROGRESS NOTES
1 year follow up.    Last EKG done on 12/20/2024.    Reports intermittent chest pain near his left ribcage, dizzy, shortness of breath, and edema in his lower extremities.    Denies palpitations.     
      Subjective:  General:   No fever, no chills, No fatigue or weight loss  Pulmonary:    No dyspnea, no wheezing  Cardiac:    Denies recent chest pain,   GI:     No nausea or vomiting, no abdominal pain  Neuro:    some dizziness or light headedness,   Musculoskeletal:  No recent active issues  Extremities:   No edema, no obvious claudication       Objective:  General:   Well developed, well nourished  Lungs:   Clear to auscultation  Heart:    Normal S1 S2, Slight murmur. no rubs, no gallops  Abdomen:   Soft, non tender, no organomegalies, positive bowel sounds  Extremities:   No edema, no cyanosis, good peripheral pulses  Neurological:   Awake, alert, oriented. No obvious focal deficits  Musculoskelatal:  No obvious deformities   /82   Pulse 74   Ht 1.778 m (5' 10\")   Wt 74.8 kg (165 lb)   BMI 23.68 kg/m²     Assessment:  Assessment & Plan    Diagnosis Orders   1. Coronary artery disease involving coronary bypass graft of native heart without angina pectoris        2. Familial hypercholesterolemia        As above  Recurrent dizziness  Chronic \  ?? BP related     Plan:  No follow-ups on file.  Stop metoprolol  Follow the BP  Continue risk factor modification and medical management  Thank you for allowing me to participate in the care of your patient. Please don't hesitate to contact me regarding any further issues related to the patient care      Orders Placed:  No orders of the defined types were placed in this encounter.      Prescribed:  No orders of the defined types were placed in this encounter.         Discussed use, benefit, and side effects of prescribed medications. All patient questions answered. Pt voicedunderstanding. Instructed to continue current medications, diet and exercise. Continue risk factor modification and medical management. Patient agreed with treatment plan. Follow up as directed.    Electronically signedby Jazzy Brooks MD on 4/11/2025 at 1:57 PM

## 2025-04-22 RX ORDER — BUSPIRONE HYDROCHLORIDE 10 MG/1
TABLET ORAL
Qty: 180 TABLET | Refills: 1 | Status: SHIPPED | OUTPATIENT
Start: 2025-04-22

## 2025-04-22 NOTE — TELEPHONE ENCOUNTER
Date of last visit:  2/7/2025  Date of next visit:  5/16/2025    Requested Prescriptions     Pending Prescriptions Disp Refills    busPIRone (BUSPAR) 10 MG tablet [Pharmacy Med Name: BUSPIRONE HCL 10 MG TABLET] 180 tablet 1     Sig: ONE TAB IN THE PM AND A SECOND DOSE DURING DAY AS NEEDED

## 2025-05-16 ENCOUNTER — OFFICE VISIT (OUTPATIENT)
Dept: FAMILY MEDICINE CLINIC | Age: 89
End: 2025-05-16

## 2025-05-16 VITALS
RESPIRATION RATE: 14 BRPM | WEIGHT: 157 LBS | SYSTOLIC BLOOD PRESSURE: 112 MMHG | BODY MASS INDEX: 22.48 KG/M2 | HEIGHT: 70 IN | DIASTOLIC BLOOD PRESSURE: 64 MMHG | HEART RATE: 88 BPM

## 2025-05-16 DIAGNOSIS — M48.062 LUMBAR STENOSIS WITH NEUROGENIC CLAUDICATION: ICD-10-CM

## 2025-05-16 DIAGNOSIS — I25.10 CORONARY ARTERY DISEASE INVOLVING NATIVE CORONARY ARTERY OF NATIVE HEART WITHOUT ANGINA PECTORIS: ICD-10-CM

## 2025-05-16 DIAGNOSIS — E78.00 PURE HYPERCHOLESTEROLEMIA: ICD-10-CM

## 2025-05-16 DIAGNOSIS — I95.1 AUTONOMIC ORTHOSTATIC HYPOTENSION: ICD-10-CM

## 2025-05-16 DIAGNOSIS — Z00.00 MEDICARE ANNUAL WELLNESS VISIT, SUBSEQUENT: Primary | ICD-10-CM

## 2025-05-16 DIAGNOSIS — E03.4 HYPOTHYROIDISM DUE TO ACQUIRED ATROPHY OF THYROID: ICD-10-CM

## 2025-05-16 DIAGNOSIS — I10 PRIMARY HYPERTENSION: ICD-10-CM

## 2025-05-16 DIAGNOSIS — E11.9 TYPE 2 DIABETES MELLITUS WITHOUT COMPLICATION, WITHOUT LONG-TERM CURRENT USE OF INSULIN (HCC): ICD-10-CM

## 2025-05-16 DIAGNOSIS — Z95.1 S/P CABG (CORONARY ARTERY BYPASS GRAFT): ICD-10-CM

## 2025-05-16 LAB
CHP ED QC CHECK: NORMAL
GLUCOSE BLD-MCNC: 95 MG/DL
HBA1C MFR BLD: 6.2 %

## 2025-05-16 RX ORDER — MIDODRINE HYDROCHLORIDE 5 MG/1
5 TABLET ORAL 3 TIMES DAILY
Qty: 90 TABLET | Refills: 0
Start: 2025-05-16

## 2025-05-16 ASSESSMENT — PATIENT HEALTH QUESTIONNAIRE - PHQ9
SUM OF ALL RESPONSES TO PHQ QUESTIONS 1-9: 0
1. LITTLE INTEREST OR PLEASURE IN DOING THINGS: NOT AT ALL
SUM OF ALL RESPONSES TO PHQ QUESTIONS 1-9: 0
2. FEELING DOWN, DEPRESSED OR HOPELESS: NOT AT ALL

## 2025-05-16 ASSESSMENT — ENCOUNTER SYMPTOMS
BLOOD IN STOOL: 0
SORE THROAT: 0
COUGH: 0
CHEST TIGHTNESS: 0
CONSTIPATION: 0
ABDOMINAL PAIN: 0
TROUBLE SWALLOWING: 0
EYE PAIN: 0
NAUSEA: 0
SHORTNESS OF BREATH: 0
ORTHOPNEA: 0
BACK PAIN: 0

## 2025-05-16 NOTE — PROGRESS NOTES
Subjective   Patient ID: Ravi Khan is a 89 y.o. male.    Ashd   stable      Autonomic   dysf  noted        Lumbar   back    noted      Thyroid    stable     Dizziness  Quality:  Lightheadedness and vertigo  Severity:  Moderate  Timing:  Constant  Context: not when bending over, not with head movement and not with medication    Worsened by:  Lying down  Ineffective treatments:  Change in position  Associated symptoms: no blood in stool, no chest pain, no headaches, no nausea, no palpitations, no shortness of breath and no weakness    Risk factors: hx of vertigo    Hypertension  This is a chronic problem. The current episode started more than 1 year ago. The problem has been resolved since onset. The problem is controlled. Pertinent negatives include no chest pain, headaches, orthopnea, palpitations, peripheral edema or shortness of breath. The current treatment provides significant improvement. There are no compliance problems.      Past Medical History:   Diagnosis Date    CAD (coronary artery disease)     Colon polyp     Hyperlipidemia     Hypertension     S/P CABG (coronary artery bypass graft)     Type II or unspecified type diabetes mellitus without mention of complication, not stated as uncontrolled      Past Surgical History:   Procedure Laterality Date    CARPAL TUNNEL RELEASE  1995 and 1995    left and  right     COLONOSCOPY  2009    dr. decker and due 2014    CORONARY ARTERY BYPASS GRAFT  2005    HERNIA REPAIR      LUMBAR SPINE SURGERY  11/2019      L4  and  L5   asif   at   [facility]     TOTAL KNEE ARTHROPLASTY Left 06/03/2019    Dr Marlow @ [facility]      Social History     Socioeconomic History    Marital status:      Spouse name: Not on file    Number of children: Not on file    Years of education: Not on file    Highest education level: Not on file   Occupational History    Not on file   Tobacco Use    Smoking status: Never     Passive exposure: Never    Smokeless tobacco: Never

## 2025-06-10 DIAGNOSIS — E11.9 TYPE 2 DIABETES MELLITUS WITHOUT COMPLICATION, WITHOUT LONG-TERM CURRENT USE OF INSULIN (HCC): ICD-10-CM

## 2025-06-10 DIAGNOSIS — I95.1 AUTONOMIC ORTHOSTATIC HYPOTENSION: ICD-10-CM

## 2025-06-10 RX ORDER — MIDODRINE HYDROCHLORIDE 5 MG/1
5 TABLET ORAL 3 TIMES DAILY
Qty: 90 TABLET | Refills: 3 | Status: SHIPPED | OUTPATIENT
Start: 2025-06-10

## 2025-06-10 NOTE — TELEPHONE ENCOUNTER
Ravi is requesting a refill of their   Requested Prescriptions     Pending Prescriptions Disp Refills    midodrine (PROAMATINE) 5 MG tablet 90 tablet 0     Sig: Take 1 tablet by mouth 3 times daily   . Please advise.      Last Appt:  Visit date not found  Next Appt:   Visit date not found  Preferred pharmacy:     [facility]/pharmacy #[id number] - [facility] [address] [phone] -  [phone]

## 2025-07-06 DIAGNOSIS — E11.8 TYPE 2 DIABETES MELLITUS WITH COMPLICATION, WITHOUT LONG-TERM CURRENT USE OF INSULIN (HCC): ICD-10-CM

## 2025-07-06 DIAGNOSIS — E78.00 PURE HYPERCHOLESTEROLEMIA: ICD-10-CM

## 2025-07-07 NOTE — TELEPHONE ENCOUNTER
Date of last visit:  5/16/2025  Date of next visit:  9/19/2025    Requested Prescriptions     Pending Prescriptions Disp Refills    glimepiride (AMARYL) 4 MG tablet [Pharmacy Med Name: GLIMEPIRIDE  TAB 4MG] 90 tablet 1     Sig: TAKE 1/2 TABLET TWICE A DAY    simvastatin (ZOCOR) 10 MG tablet [Pharmacy Med Name: SIMVASTATIN  TAB 10MG] 90 tablet 1     Sig: TAKE 1 TABLET NIGHTLY

## 2025-07-08 RX ORDER — SIMVASTATIN 10 MG
10 TABLET ORAL NIGHTLY
Qty: 90 TABLET | Refills: 1 | Status: SHIPPED | OUTPATIENT
Start: 2025-07-08

## 2025-07-08 RX ORDER — GLIMEPIRIDE 4 MG/1
TABLET ORAL
Qty: 90 TABLET | Refills: 1 | Status: SHIPPED | OUTPATIENT
Start: 2025-07-08

## 2025-07-31 ENCOUNTER — TELEPHONE (OUTPATIENT)
Dept: CARDIOLOGY CLINIC | Age: 89
End: 2025-07-31

## 2025-07-31 DIAGNOSIS — E03.4 HYPOTHYROIDISM DUE TO ACQUIRED ATROPHY OF THYROID: ICD-10-CM

## 2025-07-31 DIAGNOSIS — E11.8 TYPE 2 DIABETES MELLITUS WITH COMPLICATION, WITHOUT LONG-TERM CURRENT USE OF INSULIN (HCC): ICD-10-CM

## 2025-07-31 DIAGNOSIS — I95.1 AUTONOMIC ORTHOSTATIC HYPOTENSION: ICD-10-CM

## 2025-07-31 DIAGNOSIS — E11.9 TYPE 2 DIABETES MELLITUS WITHOUT COMPLICATION, WITHOUT LONG-TERM CURRENT USE OF INSULIN (HCC): ICD-10-CM

## 2025-07-31 DIAGNOSIS — E78.00 PURE HYPERCHOLESTEROLEMIA: ICD-10-CM

## 2025-07-31 RX ORDER — LEVOTHYROXINE SODIUM 25 UG/1
25 TABLET ORAL DAILY
Qty: 90 TABLET | Refills: 1 | Status: SHIPPED | OUTPATIENT
Start: 2025-07-31

## 2025-07-31 RX ORDER — SIMVASTATIN 10 MG
10 TABLET ORAL NIGHTLY
Qty: 90 TABLET | Refills: 1 | Status: SHIPPED | OUTPATIENT
Start: 2025-07-31

## 2025-07-31 RX ORDER — GLIMEPIRIDE 4 MG/1
TABLET ORAL
Qty: 90 TABLET | Refills: 1 | Status: SHIPPED | OUTPATIENT
Start: 2025-07-31

## 2025-07-31 NOTE — TELEPHONE ENCOUNTER
Date of last visit:  5/16/2025  Date of next visit:  9/19/2025    Requested Prescriptions     Pending Prescriptions Disp Refills    simvastatin (ZOCOR) 10 MG tablet 90 tablet 1     Sig: Take 1 tablet by mouth nightly    metFORMIN (GLUCOPHAGE) 500 MG tablet 270 tablet 1     Sig: TAKE 1 TABLET EVERY MORNINGAND TAKE 2 TABLETS EVERY   EVENING    levothyroxine (SYNTHROID) 25 MCG tablet 90 tablet 1     Sig: Take 1 tablet by mouth daily    glimepiride (AMARYL) 4 MG tablet 90 tablet 1     Sig: TAKE 1/2 TABLET TWICE A DAY

## 2025-07-31 NOTE — TELEPHONE ENCOUNTER
Arias called asking if he could get all of patient's RX to local pharmacy instead of mail order as he is having issues with mail order. He would like the following medication called in.    Glimepiride 4 mg 1/2 tab twice a day  Simvastatin 10 mg QD  Levothyroxine 25 mcg QD  Metformin 500 mg 1 tab in morning and 2 tabs in evening    Send 90 day supply to [facility] on [address] informed that insurance may want patient to get through mail order. He stated that he does not want to deal with mail order any more that it is easier to  at pharmacy locally.

## 2025-08-04 RX ORDER — TAMSULOSIN HYDROCHLORIDE 0.4 MG/1
0.4 CAPSULE ORAL DAILY
Qty: 90 CAPSULE | Refills: 1 | Status: SHIPPED | OUTPATIENT
Start: 2025-08-04

## 2025-08-11 ENCOUNTER — TELEMEDICINE (OUTPATIENT)
Dept: FAMILY MEDICINE CLINIC | Age: 89
End: 2025-08-11

## 2025-08-11 DIAGNOSIS — J40 BRONCHITIS: ICD-10-CM

## 2025-08-11 DIAGNOSIS — I25.10 CORONARY ARTERY DISEASE INVOLVING NATIVE CORONARY ARTERY OF NATIVE HEART WITHOUT ANGINA PECTORIS: ICD-10-CM

## 2025-08-11 DIAGNOSIS — I95.1 AUTONOMIC ORTHOSTATIC HYPOTENSION: ICD-10-CM

## 2025-08-11 DIAGNOSIS — I25.119 ATHEROSCLEROSIS OF NATIVE CORONARY ARTERY OF NATIVE HEART WITH ANGINA PECTORIS: ICD-10-CM

## 2025-08-11 DIAGNOSIS — G20.A1 PARKINSON'S DISEASE WITHOUT DYSKINESIA OR FLUCTUATING MANIFESTATIONS (HCC): ICD-10-CM

## 2025-08-11 DIAGNOSIS — E11.9 TYPE 2 DIABETES MELLITUS TREATED WITHOUT INSULIN (HCC): ICD-10-CM

## 2025-08-11 DIAGNOSIS — R53.81 PHYSICAL DECONDITIONING: ICD-10-CM

## 2025-08-11 DIAGNOSIS — I10 PRIMARY HYPERTENSION: Primary | ICD-10-CM

## 2025-08-11 DIAGNOSIS — E03.9 HYPOTHYROIDISM, UNSPECIFIED TYPE: ICD-10-CM

## 2025-08-11 PROCEDURE — 99213 OFFICE O/P EST LOW 20 MIN: CPT | Performed by: FAMILY MEDICINE

## 2025-08-11 PROCEDURE — 1036F TOBACCO NON-USER: CPT | Performed by: FAMILY MEDICINE

## 2025-08-11 PROCEDURE — G8420 CALC BMI NORM PARAMETERS: HCPCS | Performed by: FAMILY MEDICINE

## 2025-08-11 PROCEDURE — G8427 DOCREV CUR MEDS BY ELIG CLIN: HCPCS | Performed by: FAMILY MEDICINE

## 2025-08-11 RX ORDER — CARBIDOPA/LEVODOPA 10MG-100MG
1 TABLET ORAL 3 TIMES DAILY
Qty: 90 TABLET | Refills: 3 | Status: SHIPPED | OUTPATIENT
Start: 2025-08-11

## 2025-08-11 RX ORDER — AZITHROMYCIN 250 MG/1
TABLET, FILM COATED ORAL
Qty: 6 TABLET | Refills: 0 | Status: SHIPPED | OUTPATIENT
Start: 2025-08-11 | End: 2025-08-21

## 2025-08-11 ASSESSMENT — ENCOUNTER SYMPTOMS
ABDOMINAL PAIN: 0
EYE PAIN: 0
BLOOD IN STOOL: 0
SORE THROAT: 0
NAUSEA: 0
CHEST TIGHTNESS: 0
TROUBLE SWALLOWING: 0
BACK PAIN: 0
COUGH: 1
SHORTNESS OF BREATH: 0
CONSTIPATION: 0

## 2025-08-12 ENCOUNTER — TELEPHONE (OUTPATIENT)
Dept: FAMILY MEDICINE CLINIC | Age: 89
End: 2025-08-12

## 2025-08-17 ENCOUNTER — APPOINTMENT (OUTPATIENT)
Dept: GENERAL RADIOLOGY | Age: 89
End: 2025-08-17
Payer: MEDICARE

## 2025-08-17 ENCOUNTER — HOSPITAL ENCOUNTER (EMERGENCY)
Age: 89
Discharge: HOME | End: 2025-08-17
Attending: FAMILY MEDICINE
Payer: MEDICARE

## 2025-08-17 VITALS
BODY MASS INDEX: 22.9 KG/M2 | HEART RATE: 97 BPM | WEIGHT: 160 LBS | RESPIRATION RATE: 14 BRPM | SYSTOLIC BLOOD PRESSURE: 165 MMHG | HEIGHT: 70 IN | OXYGEN SATURATION: 95 % | TEMPERATURE: 97.5 F | DIASTOLIC BLOOD PRESSURE: 93 MMHG

## 2025-08-17 DIAGNOSIS — M25.551 RIGHT HIP PAIN: ICD-10-CM

## 2025-08-17 DIAGNOSIS — S50.12XA CONTUSION OF LEFT FOREARM, INITIAL ENCOUNTER: ICD-10-CM

## 2025-08-17 DIAGNOSIS — R53.81 PHYSICAL DECONDITIONING: ICD-10-CM
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
## 2025-08-17 DIAGNOSIS — R55 SYNCOPE AND COLLAPSE: Primary | ICD-10-CM

## 2025-08-17 LAB
ALBUMIN SERPL BCG-MCNC: 3.5 G/DL (ref 3.4–4.9)
ALP SERPL-CCNC: 93 U/L (ref 40–129)
ALT SERPL W/O P-5'-P-CCNC: 6 U/L (ref 10–50)
ANION GAP SERPL CALC-SCNC: 14 MEQ/L (ref 8–16)
AST SERPL-CCNC: 22 U/L (ref 10–50)
BASOPHILS ABSOLUTE: 0 THOU/MM3 (ref 0–0.1)
BASOPHILS NFR BLD AUTO: 0.4 %
BILIRUB CONJ SERPL-MCNC: 0.2 MG/DL (ref 0–0.2)
BILIRUB SERPL-MCNC: 0.6 MG/DL (ref 0.3–1.2)
BUN SERPL-MCNC: 41 MG/DL (ref 8–23)
CALCIUM SERPL-MCNC: 8.8 MG/DL (ref 8.5–10.5)
CHLORIDE SERPL-SCNC: 103 MEQ/L (ref 98–111)
CO2 SERPL-SCNC: 22 MEQ/L (ref 22–29)
CREAT SERPL-MCNC: 1.5 MG/DL (ref 0.7–1.2)
DEPRECATED RDW RBC AUTO: 46.5 FL (ref 35–45)
EKG ATRIAL RATE: 81 BPM
EKG P AXIS: 88 DEGREES
EKG P-R INTERVAL: 232 MS
EKG Q-T INTERVAL: 388 MS
EKG QRS DURATION: 80 MS
EKG QTC CALCULATION (BAZETT): 450 MS
EKG R AXIS: -20 DEGREES
EKG T AXIS: 84 DEGREES
EKG VENTRICULAR RATE: 81 BPM
EOSINOPHIL NFR BLD AUTO: 0.5 %
EOSINOPHILS ABSOLUTE: 0.1 THOU/MM3 (ref 0–0.4)
ERYTHROCYTE [DISTWIDTH] IN BLOOD BY AUTOMATED COUNT: 13.2 % (ref 11.5–14.5)
GFR SERPL CREATININE-BSD FRML MDRD: 44 ML/MIN/1.73M2
GLUCOSE SERPL-MCNC: 202 MG/DL (ref 74–109)
HCT VFR BLD AUTO: 39.3 % (ref 42–52)
HGB BLD-MCNC: 12.2 GM/DL (ref 14–18)
IMM GRANULOCYTES # BLD AUTO: 0.09 THOU/MM3 (ref 0–0.07)
IMM GRANULOCYTES NFR BLD AUTO: 0.9 %
INR PPP: 1.15 (ref 0.85–1.13)
LYMPHOCYTES ABSOLUTE: 1.1 THOU/MM3 (ref 1–4.8)
LYMPHOCYTES NFR BLD AUTO: 10.5 %
MAGNESIUM SERPL-MCNC: 1.7 MG/DL (ref 1.6–2.6)
MCH RBC QN AUTO: 30 PG (ref 26–33)
MCHC RBC AUTO-ENTMCNC: 31 GM/DL (ref 32.2–35.5)
MCV RBC AUTO: 96.6 FL (ref 80–94)
MONOCYTES ABSOLUTE: 0.5 THOU/MM3 (ref 0.4–1.3)
MONOCYTES NFR BLD AUTO: 5.3 %
NEUTROPHILS ABSOLUTE: 8.2 THOU/MM3 (ref 1.8–7.7)
NEUTROPHILS NFR BLD AUTO: 82.4 %
NRBC BLD AUTO-RTO: 0 /100 WBC
NT-PROBNP SERPL IA-MCNC: 2349 PG/ML (ref 0–449)
OSMOLALITY SERPL CALC.SUM OF ELEC: 293.4 MOSMOL/KG (ref 275–300)
PLATELET # BLD AUTO: 347 THOU/MM3 (ref 130–400)
PMV BLD AUTO: 9 FL (ref 9.4–12.4)
POTASSIUM SERPL-SCNC: 4.8 MEQ/L (ref 3.5–5.2)
PROT SERPL-MCNC: 6.7 G/DL (ref 6.4–8.3)
PROTHROMBIN TIME: 13.4 SECONDS (ref 10–13.5)
RBC # BLD AUTO: 4.07 MILL/MM3 (ref 4.7–6.1)
SODIUM SERPL-SCNC: 139 MEQ/L (ref 135–145)
TROPONIN, HIGH SENSITIVITY: 30 NG/L (ref 0–12)
WBC # BLD AUTO: 10 THOU/MM3 (ref 4.8–10.8)

## 2025-08-17 PROCEDURE — 85025 COMPLETE CBC W/AUTO DIFF WBC: CPT

## 2025-08-17 PROCEDURE — 82248 BILIRUBIN DIRECT: CPT

## 2025-08-17 PROCEDURE — 99285 EMERGENCY DEPT VISIT HI MDM: CPT

## 2025-08-17 PROCEDURE — 73090 X-RAY EXAM OF FOREARM: CPT

## 2025-08-17 PROCEDURE — 80053 COMPREHEN METABOLIC PANEL: CPT

## 2025-08-17 PROCEDURE — 84484 ASSAY OF TROPONIN QUANT: CPT

## 2025-08-17 PROCEDURE — 83880 ASSAY OF NATRIURETIC PEPTIDE: CPT

## 2025-08-17 PROCEDURE — 93010 ELECTROCARDIOGRAM REPORT: CPT | Performed by: NUCLEAR MEDICINE

## 2025-08-17 PROCEDURE — 36415 COLL VENOUS BLD VENIPUNCTURE: CPT

## 2025-08-17 PROCEDURE — 83735 ASSAY OF MAGNESIUM: CPT

## 2025-08-17 PROCEDURE — 73502 X-RAY EXAM HIP UNI 2-3 VIEWS: CPT

## 2025-08-17 PROCEDURE — 85610 PROTHROMBIN TIME: CPT

## 2025-08-17 PROCEDURE — 93005 ELECTROCARDIOGRAM TRACING: CPT | Performed by: FAMILY MEDICINE

## 2025-08-17 ASSESSMENT — PAIN DESCRIPTION - LOCATION: LOCATION: GENERALIZED

## 2025-08-17 ASSESSMENT — PAIN SCALES - GENERAL
PAINLEVEL_OUTOF10: 6
PAINLEVEL_OUTOF10: 6

## 2025-08-17 ASSESSMENT — PAIN - FUNCTIONAL ASSESSMENT
PAIN_FUNCTIONAL_ASSESSMENT: 0-10
PAIN_FUNCTIONAL_ASSESSMENT: 0-10

## 2025-08-18 ENCOUNTER — CARE COORDINATION (OUTPATIENT)
Dept: CARE COORDINATION | Age: 89
End: 2025-08-18

## 2025-08-19 ENCOUNTER — TELEPHONE (OUTPATIENT)
Dept: FAMILY MEDICINE CLINIC | Age: 89
End: 2025-08-19

## 2025-08-19 ENCOUNTER — LAB (OUTPATIENT)
Dept: LAB | Age: 89
End: 2025-08-19

## 2025-08-19 DIAGNOSIS — I10 PRIMARY HYPERTENSION: ICD-10-CM

## 2025-08-19 DIAGNOSIS — R53.81 PHYSICAL DECONDITIONING: ICD-10-CM

## 2025-08-19 LAB
ANION GAP SERPL CALC-SCNC: 13 MEQ/L (ref 8–16)
BASOPHILS ABSOLUTE: 0.1 THOU/MM3 (ref 0–0.1)
BASOPHILS NFR BLD AUTO: 0.7 %
BUN SERPL-MCNC: 44 MG/DL (ref 8–23)
CALCIUM SERPL-MCNC: 8.6 MG/DL (ref 8.5–10.5)
CHLORIDE SERPL-SCNC: 104 MEQ/L (ref 98–111)
CO2 SERPL-SCNC: 20 MEQ/L (ref 22–29)
CREAT SERPL-MCNC: 1.3 MG/DL (ref 0.7–1.2)
DEPRECATED RDW RBC AUTO: 46.5 FL (ref 35–45)
EOSINOPHIL NFR BLD AUTO: 1.5 %
EOSINOPHILS ABSOLUTE: 0.1 THOU/MM3 (ref 0–0.4)
ERYTHROCYTE [DISTWIDTH] IN BLOOD BY AUTOMATED COUNT: 13.2 % (ref 11.5–14.5)
GFR SERPL CREATININE-BSD FRML MDRD: 53 ML/MIN/1.73M2
GLUCOSE SERPL-MCNC: 148 MG/DL (ref 74–109)
HCT VFR BLD AUTO: 35.3 % (ref 42–52)
HGB BLD-MCNC: 11 GM/DL (ref 14–18)
IMM GRANULOCYTES # BLD AUTO: 0.04 THOU/MM3 (ref 0–0.07)
IMM GRANULOCYTES NFR BLD AUTO: 0.5 %
LYMPHOCYTES ABSOLUTE: 1.4 THOU/MM3 (ref 1–4.8)
LYMPHOCYTES NFR BLD AUTO: 18.5 %
MCH RBC QN AUTO: 30 PG (ref 26–33)
MCHC RBC AUTO-ENTMCNC: 31.2 GM/DL (ref 32.2–35.5)
MCV RBC AUTO: 96.2 FL (ref 80–94)
MONOCYTES ABSOLUTE: 0.5 THOU/MM3 (ref 0.4–1.3)
MONOCYTES NFR BLD AUTO: 7.3 %
NEUTROPHILS ABSOLUTE: 5.3 THOU/MM3 (ref 1.8–7.7)
NEUTROPHILS NFR BLD AUTO: 71.5 %
NRBC BLD AUTO-RTO: 0 /100 WBC
PLATELET # BLD AUTO: 310 THOU/MM3 (ref 130–400)
PMV BLD AUTO: 9.4 FL (ref 9.4–12.4)
POTASSIUM SERPL-SCNC: 4.6 MEQ/L (ref 3.5–5.2)
RBC # BLD AUTO: 3.67 MILL/MM3 (ref 4.7–6.1)
SODIUM SERPL-SCNC: 137 MEQ/L (ref 135–145)
TSH SERPL DL<=0.05 MIU/L-ACNC: 2.31 UIU/ML (ref 0.27–4.2)
WBC # BLD AUTO: 7.4 THOU/MM3 (ref 4.8–10.8)

## 2025-08-20 ENCOUNTER — RESULTS FOLLOW-UP (OUTPATIENT)
Dept: FAMILY MEDICINE CLINIC | Age: 89
End: 2025-08-20

## 2025-08-20 DIAGNOSIS — E11.9 TYPE 2 DIABETES MELLITUS WITHOUT COMPLICATION, WITHOUT LONG-TERM CURRENT USE OF INSULIN (HCC): ICD-10-CM

## 2025-08-20 DIAGNOSIS — I95.1 AUTONOMIC ORTHOSTATIC HYPOTENSION: ICD-10-CM

## 2025-08-20 RX ORDER — MIDODRINE HYDROCHLORIDE 5 MG/1
10 TABLET ORAL 3 TIMES DAILY
Qty: 180 TABLET | Refills: 0
Start: 2025-08-20

## 2025-08-22 ENCOUNTER — TELEPHONE (OUTPATIENT)
Dept: FAMILY MEDICINE CLINIC | Age: 89
End: 2025-08-22

## 2025-08-25 ENCOUNTER — TELEPHONE (OUTPATIENT)
Dept: FAMILY MEDICINE CLINIC | Age: 89
End: 2025-08-25

## 2025-08-26 ENCOUNTER — TELEPHONE (OUTPATIENT)
Dept: FAMILY MEDICINE CLINIC | Age: 89
End: 2025-08-26

## 2025-08-31 PROBLEM — R55 NEAR SYNCOPE: Status: ACTIVE | Noted: 2025-08-31
